# Patient Record
Sex: FEMALE | Race: WHITE | Employment: PART TIME
[De-identification: names, ages, dates, MRNs, and addresses within clinical notes are randomized per-mention and may not be internally consistent; named-entity substitution may affect disease eponyms.]

---

## 2017-01-05 ENCOUNTER — OFFICE VISIT (OUTPATIENT)
Dept: ORTHOPEDIC SURGERY | Facility: CLINIC | Age: 37
End: 2017-01-05

## 2017-01-05 DIAGNOSIS — M47.816 FACET ARTHRITIS OF LUMBAR REGION: Primary | ICD-10-CM

## 2017-01-05 PROCEDURE — 99213 OFFICE O/P EST LOW 20 MIN: CPT | Performed by: ORTHOPAEDIC SURGERY

## 2017-01-05 ASSESSMENT — ENCOUNTER SYMPTOMS: BACK PAIN: 1

## 2017-02-07 ENCOUNTER — TELEPHONE (OUTPATIENT)
Dept: PAIN MANAGEMENT | Facility: CLINIC | Age: 37
End: 2017-02-07

## 2017-02-08 ENCOUNTER — TELEPHONE (OUTPATIENT)
Dept: PAIN MANAGEMENT | Facility: CLINIC | Age: 37
End: 2017-02-08

## 2017-02-16 ENCOUNTER — OFFICE VISIT (OUTPATIENT)
Dept: PAIN MANAGEMENT | Facility: CLINIC | Age: 37
End: 2017-02-16

## 2017-02-16 VITALS
HEIGHT: 63 IN | DIASTOLIC BLOOD PRESSURE: 69 MMHG | WEIGHT: 113 LBS | BODY MASS INDEX: 20.02 KG/M2 | HEART RATE: 80 BPM | RESPIRATION RATE: 16 BRPM | OXYGEN SATURATION: 96 % | SYSTOLIC BLOOD PRESSURE: 103 MMHG

## 2017-02-16 DIAGNOSIS — M51.9 LUMBAR DISC DISEASE: Chronic | ICD-10-CM

## 2017-02-16 DIAGNOSIS — Z92.241 S/P EPIDURAL STEROID INJECTION: ICD-10-CM

## 2017-02-16 DIAGNOSIS — M47.816 SPONDYLOSIS OF LUMBAR REGION WITHOUT MYELOPATHY OR RADICULOPATHY: Primary | Chronic | ICD-10-CM

## 2017-02-16 PROCEDURE — 99213 OFFICE O/P EST LOW 20 MIN: CPT | Performed by: ANESTHESIOLOGY

## 2017-02-16 ASSESSMENT — ENCOUNTER SYMPTOMS: BACK PAIN: 1

## 2017-03-15 PROBLEM — O24.414 INSULIN CONTROLLED GESTATIONAL DIABETES MELLITUS (GDM) IN THIRD TRIMESTER: Status: ACTIVE | Noted: 2017-03-15

## 2017-03-22 ENCOUNTER — TELEPHONE (OUTPATIENT)
Dept: PAIN MANAGEMENT | Age: 37
End: 2017-03-22

## 2017-03-22 ENCOUNTER — OFFICE VISIT (OUTPATIENT)
Dept: PAIN MANAGEMENT | Age: 37
End: 2017-03-22
Payer: COMMERCIAL

## 2017-03-22 VITALS
TEMPERATURE: 97.3 F | RESPIRATION RATE: 16 BRPM | SYSTOLIC BLOOD PRESSURE: 95 MMHG | WEIGHT: 115.2 LBS | DIASTOLIC BLOOD PRESSURE: 60 MMHG | BODY MASS INDEX: 21.2 KG/M2 | OXYGEN SATURATION: 100 % | HEIGHT: 62 IN | HEART RATE: 80 BPM

## 2017-03-22 DIAGNOSIS — Z92.241 S/P EPIDURAL STEROID INJECTION: ICD-10-CM

## 2017-03-22 DIAGNOSIS — M54.16 LUMBAR RADICULAR SYNDROME: ICD-10-CM

## 2017-03-22 DIAGNOSIS — M47.816 SPONDYLOSIS OF LUMBAR REGION WITHOUT MYELOPATHY OR RADICULOPATHY: Chronic | ICD-10-CM

## 2017-03-22 DIAGNOSIS — M51.9 LUMBAR DISC DISEASE: Primary | Chronic | ICD-10-CM

## 2017-03-22 PROCEDURE — 99214 OFFICE O/P EST MOD 30 MIN: CPT | Performed by: ANESTHESIOLOGY

## 2017-03-22 RX ORDER — GABAPENTIN 100 MG/1
200 CAPSULE ORAL 3 TIMES DAILY
Qty: 120 CAPSULE | Refills: 2 | Status: SHIPPED | OUTPATIENT
Start: 2017-03-22 | End: 2017-05-03 | Stop reason: SDUPTHER

## 2017-03-22 RX ORDER — CYCLOBENZAPRINE HCL 10 MG
10 TABLET ORAL
COMMUNITY
Start: 2017-02-17 | End: 2017-05-03 | Stop reason: ALTCHOICE

## 2017-03-22 ASSESSMENT — ENCOUNTER SYMPTOMS
RESPIRATORY NEGATIVE: 1
GASTROINTESTINAL NEGATIVE: 1
BACK PAIN: 1
ALLERGIC/IMMUNOLOGIC NEGATIVE: 1
EYES NEGATIVE: 1

## 2017-03-23 ENCOUNTER — TELEPHONE (OUTPATIENT)
Dept: PAIN MANAGEMENT | Age: 37
End: 2017-03-23

## 2017-03-23 ENCOUNTER — OFFICE VISIT (OUTPATIENT)
Dept: ORTHOPEDIC SURGERY | Age: 37
End: 2017-03-23
Payer: COMMERCIAL

## 2017-03-23 DIAGNOSIS — M47.816 SPONDYLOSIS OF LUMBAR REGION WITHOUT MYELOPATHY OR RADICULOPATHY: Primary | Chronic | ICD-10-CM

## 2017-03-23 DIAGNOSIS — M51.36 BULGING LUMBAR DISC: ICD-10-CM

## 2017-03-23 DIAGNOSIS — M47.816 FACET ARTHRITIS OF LUMBAR REGION: Primary | ICD-10-CM

## 2017-03-23 DIAGNOSIS — M48.061 SPINAL STENOSIS AT L4-L5 LEVEL: ICD-10-CM

## 2017-03-23 PROCEDURE — 99213 OFFICE O/P EST LOW 20 MIN: CPT | Performed by: ORTHOPAEDIC SURGERY

## 2017-03-28 PROBLEM — O36.8390 VARIABLE FETAL HEART RATE DECELERATIONS, ANTEPARTUM: Status: ACTIVE | Noted: 2017-03-28

## 2017-03-28 PROBLEM — O28.8 NON-REACTIVE NST (NON-STRESS TEST): Status: ACTIVE | Noted: 2017-03-28

## 2017-04-03 ENCOUNTER — TELEPHONE (OUTPATIENT)
Dept: PAIN MANAGEMENT | Age: 37
End: 2017-04-03

## 2017-04-17 ENCOUNTER — TELEPHONE (OUTPATIENT)
Dept: PAIN MANAGEMENT | Age: 37
End: 2017-04-17

## 2017-04-19 ENCOUNTER — TELEPHONE (OUTPATIENT)
Dept: FAMILY MEDICINE CLINIC | Age: 37
End: 2017-04-19

## 2017-05-01 ENCOUNTER — TELEPHONE (OUTPATIENT)
Dept: PAIN MANAGEMENT | Age: 37
End: 2017-05-01

## 2017-05-02 ENCOUNTER — TELEPHONE (OUTPATIENT)
Dept: ORTHOPEDIC SURGERY | Age: 37
End: 2017-05-02

## 2017-05-03 ENCOUNTER — TELEPHONE (OUTPATIENT)
Dept: PAIN MANAGEMENT | Age: 37
End: 2017-05-03

## 2017-06-22 ENCOUNTER — OFFICE VISIT (OUTPATIENT)
Dept: ORTHOPEDIC SURGERY | Age: 37
End: 2017-06-22
Payer: COMMERCIAL

## 2017-06-22 DIAGNOSIS — M54.31 SCIATICA OF RIGHT SIDE: ICD-10-CM

## 2017-06-22 DIAGNOSIS — M47.816 FACET ARTHRITIS OF LUMBAR REGION: Primary | ICD-10-CM

## 2017-06-22 DIAGNOSIS — M48.061 SPINAL STENOSIS AT L4-L5 LEVEL: ICD-10-CM

## 2017-06-22 PROCEDURE — 99213 OFFICE O/P EST LOW 20 MIN: CPT | Performed by: ORTHOPAEDIC SURGERY

## 2017-08-02 PROBLEM — N30.20 CYSTITIS, CHRONIC: Status: ACTIVE | Noted: 2017-08-02

## 2017-08-02 PROBLEM — N13.30 HYDRONEPHROSIS: Status: ACTIVE | Noted: 2017-08-02

## 2017-09-25 ENCOUNTER — OFFICE VISIT (OUTPATIENT)
Dept: ORTHOPEDIC SURGERY | Age: 37
End: 2017-09-25
Payer: COMMERCIAL

## 2017-09-25 DIAGNOSIS — M47.816 FACET ARTHRITIS OF LUMBAR REGION: ICD-10-CM

## 2017-09-25 DIAGNOSIS — M47.816 SPONDYLOSIS OF LUMBAR REGION WITHOUT MYELOPATHY OR RADICULOPATHY: Chronic | ICD-10-CM

## 2017-09-25 DIAGNOSIS — M48.061 SPINAL STENOSIS AT L4-L5 LEVEL: Primary | ICD-10-CM

## 2017-09-25 PROCEDURE — 99213 OFFICE O/P EST LOW 20 MIN: CPT | Performed by: ORTHOPAEDIC SURGERY

## 2018-01-17 PROBLEM — Z79.899 MEDICATION MANAGEMENT: Status: ACTIVE | Noted: 2018-01-17

## 2018-07-30 ENCOUNTER — HOSPITAL ENCOUNTER (OUTPATIENT)
Age: 38
Discharge: HOME | End: 2018-08-01
Payer: COMMERCIAL

## 2018-07-30 ENCOUNTER — HOSPITAL ENCOUNTER (OUTPATIENT)
Dept: ULTRASOUND IMAGING | Age: 38
Discharge: HOME | End: 2018-08-01
Payer: COMMERCIAL

## 2018-07-30 DIAGNOSIS — E04.1 NONTOXIC SINGLE THYROID NODULE: ICD-10-CM

## 2018-07-30 PROCEDURE — 76536 US EXAM OF HEAD AND NECK: CPT

## 2019-01-02 ENCOUNTER — OFFICE VISIT (OUTPATIENT)
Dept: PRIMARY CARE CLINIC | Age: 39
End: 2019-01-02
Payer: COMMERCIAL

## 2019-01-02 VITALS
HEART RATE: 81 BPM | DIASTOLIC BLOOD PRESSURE: 68 MMHG | TEMPERATURE: 98.2 F | OXYGEN SATURATION: 99 % | BODY MASS INDEX: 20.48 KG/M2 | WEIGHT: 113.8 LBS | SYSTOLIC BLOOD PRESSURE: 102 MMHG

## 2019-01-02 DIAGNOSIS — J06.9 ACUTE URI: Primary | ICD-10-CM

## 2019-01-02 DIAGNOSIS — Z28.21 REFUSED INFLUENZA VACCINE: ICD-10-CM

## 2019-01-02 DIAGNOSIS — Z28.21 REFUSED STREPTOCOCCUS PNEUMONIAE VACCINATION: ICD-10-CM

## 2019-01-02 DIAGNOSIS — R06.02 SOB (SHORTNESS OF BREATH): ICD-10-CM

## 2019-01-02 PROCEDURE — 4004F PT TOBACCO SCREEN RCVD TLK: CPT | Performed by: PHYSICIAN ASSISTANT

## 2019-01-02 PROCEDURE — G8484 FLU IMMUNIZE NO ADMIN: HCPCS | Performed by: PHYSICIAN ASSISTANT

## 2019-01-02 PROCEDURE — G8427 DOCREV CUR MEDS BY ELIG CLIN: HCPCS | Performed by: PHYSICIAN ASSISTANT

## 2019-01-02 PROCEDURE — 99213 OFFICE O/P EST LOW 20 MIN: CPT | Performed by: PHYSICIAN ASSISTANT

## 2019-01-02 PROCEDURE — G8420 CALC BMI NORM PARAMETERS: HCPCS | Performed by: PHYSICIAN ASSISTANT

## 2019-01-02 RX ORDER — ALBUTEROL SULFATE 90 UG/1
2 AEROSOL, METERED RESPIRATORY (INHALATION) EVERY 6 HOURS PRN
Qty: 1 INHALER | Refills: 0 | Status: SHIPPED | OUTPATIENT
Start: 2019-01-02 | End: 2019-01-29

## 2019-01-02 RX ORDER — GABAPENTIN 300 MG/1
300 CAPSULE ORAL
COMMUNITY
End: 2019-01-29

## 2019-01-02 ASSESSMENT — ENCOUNTER SYMPTOMS
WHEEZING: 0
COUGH: 1
CHEST TIGHTNESS: 1
SHORTNESS OF BREATH: 1

## 2019-01-05 PROBLEM — Z28.21 REFUSED INFLUENZA VACCINE: Status: ACTIVE | Noted: 2019-01-05

## 2019-01-05 PROBLEM — Z28.21 REFUSED STREPTOCOCCUS PNEUMONIAE VACCINATION: Status: ACTIVE | Noted: 2019-01-05

## 2019-01-05 ASSESSMENT — ENCOUNTER SYMPTOMS
RHINORRHEA: 1
SORE THROAT: 1
SINUS PRESSURE: 0

## 2019-01-29 ENCOUNTER — OFFICE VISIT (OUTPATIENT)
Dept: PRIMARY CARE CLINIC | Age: 39
End: 2019-01-29
Payer: COMMERCIAL

## 2019-01-29 VITALS
HEART RATE: 65 BPM | DIASTOLIC BLOOD PRESSURE: 64 MMHG | OXYGEN SATURATION: 99 % | SYSTOLIC BLOOD PRESSURE: 106 MMHG | WEIGHT: 115.4 LBS | BODY MASS INDEX: 20.77 KG/M2

## 2019-01-29 DIAGNOSIS — M51.9 LUMBAR DISC DISEASE: Primary | Chronic | ICD-10-CM

## 2019-01-29 DIAGNOSIS — M47.816 FACET ARTHRITIS OF LUMBAR REGION: ICD-10-CM

## 2019-01-29 DIAGNOSIS — B07.9 VIRAL WARTS, UNSPECIFIED TYPE: ICD-10-CM

## 2019-01-29 DIAGNOSIS — M47.816 SPONDYLOSIS OF LUMBAR REGION WITHOUT MYELOPATHY OR RADICULOPATHY: Chronic | ICD-10-CM

## 2019-01-29 PROCEDURE — 4004F PT TOBACCO SCREEN RCVD TLK: CPT | Performed by: PHYSICIAN ASSISTANT

## 2019-01-29 PROCEDURE — 99214 OFFICE O/P EST MOD 30 MIN: CPT | Performed by: PHYSICIAN ASSISTANT

## 2019-01-29 PROCEDURE — G8420 CALC BMI NORM PARAMETERS: HCPCS | Performed by: PHYSICIAN ASSISTANT

## 2019-01-29 PROCEDURE — G8427 DOCREV CUR MEDS BY ELIG CLIN: HCPCS | Performed by: PHYSICIAN ASSISTANT

## 2019-01-29 PROCEDURE — G8484 FLU IMMUNIZE NO ADMIN: HCPCS | Performed by: PHYSICIAN ASSISTANT

## 2019-01-29 ASSESSMENT — ENCOUNTER SYMPTOMS
NAUSEA: 0
ABDOMINAL PAIN: 0
GASTROINTESTINAL NEGATIVE: 1
SHORTNESS OF BREATH: 0
BLOOD IN STOOL: 0
CHEST TIGHTNESS: 0
BACK PAIN: 1
COUGH: 0

## 2019-02-08 ENCOUNTER — OFFICE VISIT (OUTPATIENT)
Dept: PRIMARY CARE CLINIC | Age: 39
End: 2019-02-08
Payer: COMMERCIAL

## 2019-02-08 VITALS
OXYGEN SATURATION: 99 % | HEART RATE: 68 BPM | WEIGHT: 116.6 LBS | DIASTOLIC BLOOD PRESSURE: 60 MMHG | BODY MASS INDEX: 20.99 KG/M2 | SYSTOLIC BLOOD PRESSURE: 112 MMHG

## 2019-02-08 DIAGNOSIS — B07.9 VIRAL WARTS, UNSPECIFIED TYPE: Primary | ICD-10-CM

## 2019-02-08 PROCEDURE — 17110 DESTRUCTION B9 LES UP TO 14: CPT | Performed by: PHYSICIAN ASSISTANT

## 2019-02-08 ASSESSMENT — PATIENT HEALTH QUESTIONNAIRE - PHQ9
SUM OF ALL RESPONSES TO PHQ9 QUESTIONS 1 & 2: 0
1. LITTLE INTEREST OR PLEASURE IN DOING THINGS: 0
SUM OF ALL RESPONSES TO PHQ QUESTIONS 1-9: 0
SUM OF ALL RESPONSES TO PHQ QUESTIONS 1-9: 0
2. FEELING DOWN, DEPRESSED OR HOPELESS: 0

## 2019-02-11 ENCOUNTER — PATIENT MESSAGE (OUTPATIENT)
Dept: PRIMARY CARE CLINIC | Age: 39
End: 2019-02-11

## 2019-02-12 ENCOUNTER — PATIENT MESSAGE (OUTPATIENT)
Dept: PRIMARY CARE CLINIC | Age: 39
End: 2019-02-12

## 2019-02-12 DIAGNOSIS — M47.816 SPONDYLOSIS OF LUMBAR REGION WITHOUT MYELOPATHY OR RADICULOPATHY: Chronic | ICD-10-CM

## 2019-02-12 DIAGNOSIS — M54.31 SCIATICA OF RIGHT SIDE: ICD-10-CM

## 2019-02-12 DIAGNOSIS — M51.9 LUMBAR DISC DISEASE: Chronic | ICD-10-CM

## 2019-02-12 DIAGNOSIS — M51.36 BULGING LUMBAR DISC: ICD-10-CM

## 2019-02-12 DIAGNOSIS — M48.061 SPINAL STENOSIS AT L4-L5 LEVEL: ICD-10-CM

## 2019-02-12 DIAGNOSIS — M47.816 FACET ARTHRITIS OF LUMBAR REGION: Primary | ICD-10-CM

## 2019-02-13 ENCOUNTER — TELEPHONE (OUTPATIENT)
Dept: PRIMARY CARE CLINIC | Age: 39
End: 2019-02-13

## 2019-02-13 DIAGNOSIS — M47.816 FACET ARTHRITIS OF LUMBAR REGION: Primary | ICD-10-CM

## 2019-02-13 DIAGNOSIS — M51.9 LUMBAR DISC DISEASE: Chronic | ICD-10-CM

## 2019-02-15 ENCOUNTER — PATIENT MESSAGE (OUTPATIENT)
Dept: PRIMARY CARE CLINIC | Age: 39
End: 2019-02-15

## 2019-02-25 ENCOUNTER — HOSPITAL ENCOUNTER (OUTPATIENT)
Dept: MRI IMAGING | Facility: CLINIC | Age: 39
Discharge: HOME | End: 2019-02-27
Payer: COMMERCIAL

## 2019-02-25 DIAGNOSIS — M47.816 FACET ARTHRITIS OF LUMBAR REGION: ICD-10-CM

## 2019-02-25 DIAGNOSIS — M51.9 LUMBAR DISC DISEASE: Chronic | ICD-10-CM

## 2019-02-25 PROCEDURE — 72148 MRI LUMBAR SPINE W/O DYE: CPT

## 2019-04-29 ENCOUNTER — PATIENT MESSAGE (OUTPATIENT)
Dept: PRIMARY CARE CLINIC | Age: 39
End: 2019-04-29

## 2019-04-30 NOTE — TELEPHONE ENCOUNTER
Darshan Grimm will be out of the office for 2 weeks, pt was notified of this, she states she will wait for Darshan Grimm to be back in the office to address this.

## 2019-08-29 ENCOUNTER — OFFICE VISIT (OUTPATIENT)
Dept: PRIMARY CARE CLINIC | Age: 39
End: 2019-08-29
Payer: COMMERCIAL

## 2019-08-29 VITALS
BODY MASS INDEX: 20.55 KG/M2 | OXYGEN SATURATION: 98 % | WEIGHT: 114.2 LBS | HEART RATE: 64 BPM | SYSTOLIC BLOOD PRESSURE: 106 MMHG | DIASTOLIC BLOOD PRESSURE: 68 MMHG | TEMPERATURE: 98.3 F

## 2019-08-29 DIAGNOSIS — L23.7 ALLERGIC DERMATITIS DUE TO POISON IVY: ICD-10-CM

## 2019-08-29 PROCEDURE — G8427 DOCREV CUR MEDS BY ELIG CLIN: HCPCS | Performed by: PHYSICIAN ASSISTANT

## 2019-08-29 PROCEDURE — G8420 CALC BMI NORM PARAMETERS: HCPCS | Performed by: PHYSICIAN ASSISTANT

## 2019-08-29 PROCEDURE — 4004F PT TOBACCO SCREEN RCVD TLK: CPT | Performed by: PHYSICIAN ASSISTANT

## 2019-08-29 PROCEDURE — 99213 OFFICE O/P EST LOW 20 MIN: CPT | Performed by: PHYSICIAN ASSISTANT

## 2019-08-29 RX ORDER — METHYLPREDNISOLONE 4 MG/1
TABLET ORAL
Qty: 1 KIT | Refills: 0 | Status: SHIPPED | OUTPATIENT
Start: 2019-08-29 | End: 2019-09-04

## 2019-08-29 RX ORDER — CETIRIZINE HYDROCHLORIDE 10 MG/1
10 TABLET ORAL 2 TIMES DAILY
Qty: 30 TABLET | Refills: 0 | Status: SHIPPED | OUTPATIENT
Start: 2019-08-29 | End: 2019-10-01 | Stop reason: SDUPTHER

## 2019-08-29 ASSESSMENT — ENCOUNTER SYMPTOMS
EYE ITCHING: 1
COLOR CHANGE: 0
ABDOMINAL PAIN: 0
RESPIRATORY NEGATIVE: 1

## 2019-09-05 DIAGNOSIS — L23.7 ALLERGIC DERMATITIS DUE TO POISON IVY: Primary | ICD-10-CM

## 2019-09-05 RX ORDER — RANITIDINE 300 MG/1
300 TABLET ORAL NIGHTLY
Qty: 30 TABLET | Refills: 0 | Status: SHIPPED | OUTPATIENT
Start: 2019-09-05 | End: 2019-10-10 | Stop reason: SDUPTHER

## 2019-09-05 RX ORDER — CLOBETASOL PROPIONATE 0.5 MG/G
CREAM TOPICAL
Qty: 60 G | Refills: 0 | Status: SHIPPED | OUTPATIENT
Start: 2019-09-05 | End: 2021-08-09 | Stop reason: ALTCHOICE

## 2019-10-02 RX ORDER — CETIRIZINE HYDROCHLORIDE 10 MG/1
TABLET ORAL
Qty: 30 TABLET | Refills: 3 | Status: SHIPPED | OUTPATIENT
Start: 2019-10-02 | End: 2019-10-10 | Stop reason: DRUGHIGH

## 2019-10-18 ENCOUNTER — OFFICE VISIT (OUTPATIENT)
Dept: PRIMARY CARE CLINIC | Age: 39
End: 2019-10-18
Payer: COMMERCIAL

## 2019-10-18 VITALS
SYSTOLIC BLOOD PRESSURE: 112 MMHG | WEIGHT: 115 LBS | HEART RATE: 59 BPM | BODY MASS INDEX: 20.7 KG/M2 | OXYGEN SATURATION: 99 % | DIASTOLIC BLOOD PRESSURE: 72 MMHG

## 2019-10-18 DIAGNOSIS — Z87.2 HISTORY OF CYST OF BREAST: ICD-10-CM

## 2019-10-18 DIAGNOSIS — Z23 NEED FOR VACCINATION: ICD-10-CM

## 2019-10-18 DIAGNOSIS — N64.4 BREAST PAIN: Primary | ICD-10-CM

## 2019-10-18 PROCEDURE — G8427 DOCREV CUR MEDS BY ELIG CLIN: HCPCS | Performed by: PHYSICIAN ASSISTANT

## 2019-10-18 PROCEDURE — 90471 IMMUNIZATION ADMIN: CPT | Performed by: PHYSICIAN ASSISTANT

## 2019-10-18 PROCEDURE — G8484 FLU IMMUNIZE NO ADMIN: HCPCS | Performed by: PHYSICIAN ASSISTANT

## 2019-10-18 PROCEDURE — G8420 CALC BMI NORM PARAMETERS: HCPCS | Performed by: PHYSICIAN ASSISTANT

## 2019-10-18 PROCEDURE — 99213 OFFICE O/P EST LOW 20 MIN: CPT | Performed by: PHYSICIAN ASSISTANT

## 2019-10-18 PROCEDURE — 4004F PT TOBACCO SCREEN RCVD TLK: CPT | Performed by: PHYSICIAN ASSISTANT

## 2019-10-18 PROCEDURE — 90732 PPSV23 VACC 2 YRS+ SUBQ/IM: CPT | Performed by: PHYSICIAN ASSISTANT

## 2019-10-18 ASSESSMENT — ENCOUNTER SYMPTOMS
NAUSEA: 0
VOMITING: 0

## 2019-10-30 DIAGNOSIS — N64.4 BREAST PAIN: ICD-10-CM

## 2019-10-30 DIAGNOSIS — Z87.2 HISTORY OF CYST OF BREAST: ICD-10-CM

## 2020-01-29 ENCOUNTER — HOSPITAL ENCOUNTER (OUTPATIENT)
Dept: ULTRASOUND IMAGING | Age: 40
Discharge: HOME | End: 2020-01-31
Payer: COMMERCIAL

## 2020-01-29 PROCEDURE — 76536 US EXAM OF HEAD AND NECK: CPT

## 2020-02-20 RX ORDER — CETIRIZINE HYDROCHLORIDE 10 MG/1
10 TABLET ORAL DAILY
Qty: 30 TABLET | Refills: 11 | Status: SHIPPED | OUTPATIENT
Start: 2020-02-20 | End: 2021-08-09 | Stop reason: ALTCHOICE

## 2020-03-11 ENCOUNTER — OFFICE VISIT (OUTPATIENT)
Dept: PRIMARY CARE CLINIC | Age: 40
End: 2020-03-11
Payer: COMMERCIAL

## 2020-03-11 VITALS
DIASTOLIC BLOOD PRESSURE: 72 MMHG | HEIGHT: 63 IN | OXYGEN SATURATION: 98 % | TEMPERATURE: 98 F | HEART RATE: 72 BPM | WEIGHT: 114 LBS | SYSTOLIC BLOOD PRESSURE: 116 MMHG | BODY MASS INDEX: 20.2 KG/M2

## 2020-03-11 PROBLEM — M54.2 CERVICALGIA: Status: ACTIVE | Noted: 2019-11-27

## 2020-03-11 PROBLEM — R09.81 NASAL CONGESTION: Status: ACTIVE | Noted: 2020-03-11

## 2020-03-11 PROBLEM — J30.2 SEASONAL ALLERGIC RHINITIS: Status: ACTIVE | Noted: 2020-03-11

## 2020-03-11 PROCEDURE — G8484 FLU IMMUNIZE NO ADMIN: HCPCS | Performed by: PHYSICIAN ASSISTANT

## 2020-03-11 PROCEDURE — 87804 INFLUENZA ASSAY W/OPTIC: CPT | Performed by: PHYSICIAN ASSISTANT

## 2020-03-11 PROCEDURE — 99213 OFFICE O/P EST LOW 20 MIN: CPT | Performed by: PHYSICIAN ASSISTANT

## 2020-03-11 PROCEDURE — G8427 DOCREV CUR MEDS BY ELIG CLIN: HCPCS | Performed by: PHYSICIAN ASSISTANT

## 2020-03-11 PROCEDURE — 4004F PT TOBACCO SCREEN RCVD TLK: CPT | Performed by: PHYSICIAN ASSISTANT

## 2020-03-11 PROCEDURE — G8420 CALC BMI NORM PARAMETERS: HCPCS | Performed by: PHYSICIAN ASSISTANT

## 2020-03-11 PROCEDURE — 96372 THER/PROPH/DIAG INJ SC/IM: CPT | Performed by: PHYSICIAN ASSISTANT

## 2020-03-11 RX ORDER — METHYLPREDNISOLONE ACETATE 80 MG/ML
80 INJECTION, SUSPENSION INTRA-ARTICULAR; INTRALESIONAL; INTRAMUSCULAR; SOFT TISSUE ONCE
Status: COMPLETED | OUTPATIENT
Start: 2020-03-11 | End: 2020-03-11

## 2020-03-11 RX ADMIN — METHYLPREDNISOLONE ACETATE 80 MG: 80 INJECTION, SUSPENSION INTRA-ARTICULAR; INTRALESIONAL; INTRAMUSCULAR; SOFT TISSUE at 10:54

## 2020-03-11 ASSESSMENT — PATIENT HEALTH QUESTIONNAIRE - PHQ9
1. LITTLE INTEREST OR PLEASURE IN DOING THINGS: 0
2. FEELING DOWN, DEPRESSED OR HOPELESS: 0
SUM OF ALL RESPONSES TO PHQ9 QUESTIONS 1 & 2: 0
SUM OF ALL RESPONSES TO PHQ QUESTIONS 1-9: 0
SUM OF ALL RESPONSES TO PHQ QUESTIONS 1-9: 0

## 2020-03-11 ASSESSMENT — ENCOUNTER SYMPTOMS
SORE THROAT: 0
EYE DISCHARGE: 1
SHORTNESS OF BREATH: 0
COUGH: 1
NAUSEA: 1
DIARRHEA: 0
WHEEZING: 0
VOMITING: 0
EYE ITCHING: 1

## 2020-03-11 NOTE — PROGRESS NOTES
rhinitis, unspecified trigger  methylPREDNISolone acetate (DEPO-MEDROL) injection 80 mg        Plan:    Depo Medrol today  Continue Claritin D  If feels too hyperactive, then stop the D or sudafed     Return if symptoms worsen or fail to improve.     Orders Placed This Encounter   Procedures    POCT Influenza A/B     Orders Placed This Encounter   Medications    methylPREDNISolone acetate (DEPO-MEDROL) injection 80 mg           Electronically signed by Mitzy Flaherty 3/11/2020 at 10:28 AM

## 2020-05-18 ENCOUNTER — TELEPHONE (OUTPATIENT)
Dept: ONCOLOGY | Age: 40
End: 2020-05-18

## 2020-06-01 ENCOUNTER — OFFICE VISIT (OUTPATIENT)
Dept: PRIMARY CARE CLINIC | Age: 40
End: 2020-06-01
Payer: COMMERCIAL

## 2020-06-01 VITALS
HEIGHT: 62 IN | WEIGHT: 111.4 LBS | OXYGEN SATURATION: 99 % | SYSTOLIC BLOOD PRESSURE: 112 MMHG | BODY MASS INDEX: 20.5 KG/M2 | DIASTOLIC BLOOD PRESSURE: 64 MMHG | TEMPERATURE: 97.8 F | HEART RATE: 80 BPM

## 2020-06-01 PROBLEM — D48.5 NEOPLASM OF UNCERTAIN BEHAVIOR OF SKIN: Status: ACTIVE | Noted: 2020-06-01

## 2020-06-01 PROCEDURE — 4004F PT TOBACCO SCREEN RCVD TLK: CPT | Performed by: PHYSICIAN ASSISTANT

## 2020-06-01 PROCEDURE — 99213 OFFICE O/P EST LOW 20 MIN: CPT | Performed by: PHYSICIAN ASSISTANT

## 2020-06-01 PROCEDURE — G8427 DOCREV CUR MEDS BY ELIG CLIN: HCPCS | Performed by: PHYSICIAN ASSISTANT

## 2020-06-01 PROCEDURE — G8420 CALC BMI NORM PARAMETERS: HCPCS | Performed by: PHYSICIAN ASSISTANT

## 2020-06-01 RX ORDER — BUSPIRONE HYDROCHLORIDE 5 MG/1
5 TABLET ORAL 2 TIMES DAILY
Qty: 60 TABLET | Refills: 0 | Status: SHIPPED | OUTPATIENT
Start: 2020-06-01 | End: 2020-06-23

## 2020-06-01 ASSESSMENT — ENCOUNTER SYMPTOMS
DIARRHEA: 1
RESPIRATORY NEGATIVE: 1

## 2020-06-01 NOTE — PROGRESS NOTES
[facility]  [address] [place] [id number]  Dept: [phone] Krystal Mednoza is a 35 y.o. female who presents today for her medical conditions/complaintsas noted below. Chief Complaint   Patient presents with    Anxiety     patient states she has been having issues with Anxiety, she said this started on 4/7/2020. HPI:     HPI  On 4-7-20 she started with anxiety when boyfriend went to [employer]. M-F working so ok and was fine when brother was come over on Fridays. She doesn't want to get to the point where she was when he ex left. She gets shaky and has diarrhea, feels nervous and sleeps a lot on the weekends when she is home by herself.    Cut back on MDew  No ETOH or rec drug use    Lesion on ring finger on right hand started as little bumps and is now a hyperkeratotic lesion that gets aggravated with washing, etc    No results found for: LDLCHOLESTEROL, LDLCALC    (goal LDL is <100)   No results found for: AST, ALT, BUN  BP Readings from Last 3 Encounters:   06/01/20 112/64   03/11/20 116/72   10/18/19 112/72          (goal 120/80)    Past Medical History:   Diagnosis Date    Abdominal pain     feels crampy/colicky    Abnormal CT of the abdomen     Acute upper respiratory infection     Coccyx contusion     Fractured coccyx ([address]     H/O acute pharyngitis     H/O acute sinusitis     H/O contact dermatitis     H/O nausea and vomiting     H/O pyelonephritis     Lump of skin       Past Surgical History:   Procedure Laterality Date    HYSTEROSCOPY      with endometrial ablation    KNEE ARTHROSCOPY      OTHER SURGICAL HISTORY Right 12/19/2016    right sided lumbar facet steroid injection    OTHER SURGICAL HISTORY  01/12/2017    lumbar medial nerve block L2-L5    PARTIAL HYSTERECTOMY      TUBAL LIGATION         Family History   Problem Relation Age of Onset    Heart Disease Mother     High Blood Pressure Mother     High Cholesterol Mother     Arthritis Father     Other Maternal Grandmother         Mantel cell lymphoma    Other Paternal Aunt         oral cancer       Social History     Tobacco Use    Smoking status: Current Every Day Smoker     Types: Cigarettes    Smokeless tobacco: Never Used   Substance Use Topics    Alcohol use: Yes     Alcohol/week: 0.0 standard drinks     Comment: occ. Current Outpatient Medications   Medication Sig Dispense Refill    busPIRone (BUSPAR) 5 MG tablet Take 1 tablet by mouth 2 times daily 60 tablet 0    cetirizine (ZYRTEC) 10 MG tablet Take 1 tablet by mouth daily 30 tablet 11    clobetasol (TEMOVATE) 0.05 % cream Apply topically 2 times daily. 60 g 0    traZODone (DESYREL) 50 MG tablet Take 1 tablet by mouth every evening      diclofenac (VOLTAREN) 50 MG EC tablet Take 1-2 tablets by mouth daily      tiZANidine (ZANAFLEX) 4 MG tablet Take 1 tablet by mouth daily      HYDROcodone-acetaminophen (NORCO) 5-325 MG per tablet Take 1 tablet by mouth nightly. .       No current facility-administered medications for this visit.       Allergies   Allergen Reactions    Tramadol Other (See Comments) and Hives     Feels intoxicated for a long time  Keeps her up  Feels intoxicated for a long time    Ciprofloxacin     Vicodin [Hydrocodone-Acetaminophen] Other (See Comments)     Feels like bugs crawling all over    Percocet [Oxycodone-Acetaminophen] Nausea And Vomiting       Health Maintenance   Topic Date Due    HIV screen  12/14/1994    Cervical cancer screen  12/14/2000    Lipid screen  12/14/2019    Flu vaccine (Season Ended) 10/18/2020 (Originally 9/1/2020)    DTaP/Tdap/Td vaccine (3 - Td) 04/19/2027    Pneumococcal 0-64 years Vaccine  Completed    Hepatitis A vaccine  Aged Out    Hepatitis B vaccine  Aged Out    Hib vaccine  Aged Out    Meningococcal (ACWY) vaccine  Aged Out       Subjective:      Review of Systems   Constitutional: Positive for appetite change and diaphoresis

## 2020-06-05 ENCOUNTER — HOSPITAL ENCOUNTER (OUTPATIENT)
Age: 40
Setting detail: SPECIMEN
Discharge: HOME | End: 2020-06-05
Payer: COMMERCIAL

## 2020-06-05 ENCOUNTER — PROCEDURE VISIT (OUTPATIENT)
Dept: PRIMARY CARE CLINIC | Age: 40
End: 2020-06-05
Payer: COMMERCIAL

## 2020-06-05 VITALS
WEIGHT: 110.8 LBS | SYSTOLIC BLOOD PRESSURE: 116 MMHG | DIASTOLIC BLOOD PRESSURE: 64 MMHG | HEIGHT: 62 IN | HEART RATE: 70 BPM | TEMPERATURE: 98.4 F | OXYGEN SATURATION: 99 % | BODY MASS INDEX: 20.39 KG/M2

## 2020-06-05 LAB
THYROXINE, FREE: 1.19 NG/DL (ref 0.93–1.7)
TSH SERPL DL<=0.05 MIU/L-ACNC: 1.71 MIU/L (ref 0.3–5)

## 2020-06-05 PROCEDURE — 11102 TANGNTL BX SKIN SINGLE LES: CPT | Performed by: PHYSICIAN ASSISTANT

## 2020-06-05 NOTE — PROGRESS NOTES
Skin Biopsy Procedure Note  6/5/2020  Willem Animeeplefletcher  1980    /64   Pulse 70   Temp 98.4 °F (36.9 °C)   Ht 5' 2\" (1.575 m)   Wt 110 lb 12.8 oz (50.3 kg)   SpO2 99%   BMI 20.27 kg/m²   VITALS REVIEWED    Allergies   Allergen Reactions    Tramadol Other (See Comments) and Hives     Feels intoxicated for a long time  Keeps her up  Feels intoxicated for a long time    Ciprofloxacin     Vicodin [Hydrocodone-Acetaminophen] Other (See Comments)     Feels like bugs crawling all over    Percocet [Oxycodone-Acetaminophen] Nausea And Vomiting       Chief Complaint   Patient presents with    Procedure     shave bx. .        Lesion:  · 1. Right hand ring finer      Indication for Biopsy 1: hyperkeratotic lesion      Procedure: The lesion(s) was cleansed with Alcohol.  2% lidocaine with and without epinephrine was used for local anaesthesia. An 8mm shave was used to obtain a specimen. The specimen(s) was    preserved and sent for pathological examination. The biopsy site(s) was  cleansed and hemostasis using ACl and a pressure dressing(s) was achieved with good results. The patient was instructed on wound care. No complications occurred and the patient tolerated the procedure well. Diagnosis Orders   1. Neoplasm of uncertain behavior of skin  Derm biopsy       Follow Up: Wound care discussed. Keep well moisturized. Watch for signs of infection which would include:  Redness, swelling, fever. If signs appear, please return to office. Return for Will call with results.        Electronically signed by Ferdinand Tatum PA-C on 6/5/2020 at 4:10 PM

## 2020-06-08 ENCOUNTER — HOSPITAL ENCOUNTER (OUTPATIENT)
Age: 40
Setting detail: SPECIMEN
Discharge: HOME | End: 2020-06-08
Payer: COMMERCIAL

## 2020-06-10 LAB — DERMATOLOGY PATHOLOGY REPORT: NORMAL

## 2020-07-06 ENCOUNTER — OFFICE VISIT (OUTPATIENT)
Dept: PRIMARY CARE CLINIC | Age: 40
End: 2020-07-06
Payer: COMMERCIAL

## 2020-07-06 VITALS
WEIGHT: 112.4 LBS | TEMPERATURE: 96.8 F | DIASTOLIC BLOOD PRESSURE: 74 MMHG | HEIGHT: 62 IN | OXYGEN SATURATION: 97 % | BODY MASS INDEX: 20.68 KG/M2 | SYSTOLIC BLOOD PRESSURE: 120 MMHG | HEART RATE: 63 BPM

## 2020-07-06 PROBLEM — D48.5 NEOPLASM OF UNCERTAIN BEHAVIOR OF SKIN: Status: RESOLVED | Noted: 2020-06-01 | Resolved: 2020-07-06

## 2020-07-06 PROBLEM — R09.81 NASAL CONGESTION: Status: RESOLVED | Noted: 2020-03-11 | Resolved: 2020-07-06

## 2020-07-06 PROBLEM — L23.7 ALLERGIC DERMATITIS DUE TO POISON IVY: Status: RESOLVED | Noted: 2019-08-29 | Resolved: 2020-07-06

## 2020-07-06 PROCEDURE — 99213 OFFICE O/P EST LOW 20 MIN: CPT | Performed by: PHYSICIAN ASSISTANT

## 2020-07-06 PROCEDURE — G8427 DOCREV CUR MEDS BY ELIG CLIN: HCPCS | Performed by: PHYSICIAN ASSISTANT

## 2020-07-06 PROCEDURE — 4004F PT TOBACCO SCREEN RCVD TLK: CPT | Performed by: PHYSICIAN ASSISTANT

## 2020-07-06 PROCEDURE — G8420 CALC BMI NORM PARAMETERS: HCPCS | Performed by: PHYSICIAN ASSISTANT

## 2020-07-06 RX ORDER — ESCITALOPRAM OXALATE 10 MG/1
10 TABLET ORAL DAILY
Qty: 30 TABLET | Refills: 3 | Status: SHIPPED | OUTPATIENT
Start: 2020-07-06 | End: 2020-10-26

## 2020-07-06 RX ORDER — SUMATRIPTAN 100 MG/1
100 TABLET, FILM COATED ORAL
Qty: 10 TABLET | Refills: 1 | Status: SHIPPED | OUTPATIENT
Start: 2020-07-06 | End: 2020-09-01

## 2020-07-06 ASSESSMENT — ENCOUNTER SYMPTOMS
NAUSEA: 0
SHORTNESS OF BREATH: 0
BACK PAIN: 0
VOMITING: 0

## 2020-07-06 NOTE — PROGRESS NOTES
[facility]  [address] [place] [id number]  Dept: [phone] Krystal Mendoza is a 35 y.o. female who presents today for her medical conditions/complaintsas noted below. Chief Complaint   Patient presents with    Medication Check     Patient taking Buspar 5mg at night. Patient said when she wakes up she feels like she has a hangover. HPI:     HPI  Taking Buspar only at night and she wakes up feeling hung over. It is not helping her anxiety. She took a second job to help keep the anxiety. Not self treating. Sleep still not good with trazadone. Denies depression. Does not want to do counseling.       Thyroid levels were normal.     No results found for: LDLCHOLESTEROL, LDLCALC    (goal LDL is <100)   No results found for: AST, ALT, BUN  BP Readings from Last 3 Encounters:   07/06/20 120/74   06/05/20 116/64   06/01/20 112/64          (goal 120/80)    Past Medical History:   Diagnosis Date    Abdominal pain     feels crampy/colicky    Abnormal CT of the abdomen     Acute upper respiratory infection     Coccyx contusion     Fractured coccyx ([address]     H/O acute pharyngitis     H/O acute sinusitis     H/O contact dermatitis     H/O nausea and vomiting     H/O pyelonephritis     Lump of skin       Past Surgical History:   Procedure Laterality Date    HYSTEROSCOPY      with endometrial ablation    KNEE ARTHROSCOPY      OTHER SURGICAL HISTORY Right 12/19/2016    right sided lumbar facet steroid injection    OTHER SURGICAL HISTORY  01/12/2017    lumbar medial nerve block L2-L5    PARTIAL HYSTERECTOMY      TUBAL LIGATION         Family History   Problem Relation Age of Onset    Heart Disease Mother     High Blood Pressure Mother     High Cholesterol Mother     Arthritis Father     Other Maternal Grandmother         Mantel cell lymphoma    Other Paternal Aunt         oral cancer       Social History     Tobacco Use    Smoking status: Current Every Day Smoker     Types: Cigarettes    Smokeless tobacco: Never Used   Substance Use Topics    Alcohol use: Yes     Alcohol/week: 0.0 standard drinks     Comment: occ. Current Outpatient Medications   Medication Sig Dispense Refill    escitalopram (LEXAPRO) 10 MG tablet Take 1 tablet by mouth daily 30 tablet 3    SUMAtriptan (IMITREX) 100 MG tablet Take 1 tablet by mouth once as needed for Migraine 10 tablet 1    cetirizine (ZYRTEC) 10 MG tablet Take 1 tablet by mouth daily 30 tablet 11    clobetasol (TEMOVATE) 0.05 % cream Apply topically 2 times daily. 60 g 0    traZODone (DESYREL) 50 MG tablet Take 1 tablet by mouth every evening      diclofenac (VOLTAREN) 50 MG EC tablet Take 1-2 tablets by mouth daily      tiZANidine (ZANAFLEX) 4 MG tablet Take 1 tablet by mouth daily      HYDROcodone-acetaminophen (NORCO) 5-325 MG per tablet Take 1 tablet by mouth nightly. .       No current facility-administered medications for this visit. Allergies   Allergen Reactions    Tramadol Other (See Comments) and Hives     Feels intoxicated for a long time  Keeps her up  Feels intoxicated for a long time    Ciprofloxacin     Vicodin [Hydrocodone-Acetaminophen] Other (See Comments)     Feels like bugs crawling all over    Percocet [Oxycodone-Acetaminophen] Nausea And Vomiting       Health Maintenance   Topic Date Due    HIV screen  12/14/1994    Cervical cancer screen  12/14/2000    Lipid screen  12/14/2019    Flu vaccine (1) 09/01/2020    DTaP/Tdap/Td vaccine (3 - Td) 04/19/2027    Pneumococcal 0-64 years Vaccine  Completed    Hepatitis A vaccine  Aged Out    Hepatitis B vaccine  Aged Out    Hib vaccine  Aged Out    Meningococcal (ACWY) vaccine  Aged Out       Subjective:      Review of Systems   Constitutional: Negative for activity change, appetite change and fatigue. Respiratory: Negative for shortness of breath. Cardiovascular: Negative for chest pain. Gastrointestinal: Negative for nausea and vomiting. Endocrine: Negative for cold intolerance and heat intolerance. Musculoskeletal: Negative for arthralgias, back pain, myalgias and neck pain. Neurological: Negative for dizziness, seizures, syncope, weakness, light-headedness and headaches. Psychiatric/Behavioral: Negative for agitation, behavioral problems, confusion, decreased concentration, dysphoric mood, hallucinations, self-injury, sleep disturbance and suicidal ideas. The patient is not nervous/anxious and is not hyperactive. Objective:     /74   Pulse 63   Temp 96.8 °F (36 °C)   Ht 5' 2\" (1.575 m)   Wt 112 lb 6.4 oz (51 kg)   SpO2 97%   BMI 20.56 kg/m²   Physical Exam  Vitals signs and nursing note reviewed. Constitutional:       Appearance: Normal appearance. Cardiovascular:      Rate and Rhythm: Normal rate and regular rhythm. Heart sounds: Normal heart sounds. Pulmonary:      Effort: Pulmonary effort is normal.      Breath sounds: Normal breath sounds. No wheezing, rhonchi or rales. Neurological:      Mental Status: She is alert and oriented to person, place, and time. Psychiatric:         Mood and Affect: Mood normal.         Behavior: Behavior normal.         Thought Content: Thought content normal.         Judgment: Judgment normal.         Assessment:       Diagnosis Orders   1. Medication management     2. Primary hypersomnia     3. Migraine without aura and without status migrainosus, not intractable          Plan:    D/C Buspar  Imitrex for migraine. Start Lexapro 10mg 1 po qhs  Does not want counseling. Return in about 6 weeks (around 8/17/2020), or Monday for wart treatment, for On lexapro and on Monday for wart treatment. No orders of the defined types were placed in this encounter.     Orders Placed This Encounter   Medications    escitalopram (LEXAPRO) 10 MG tablet     Sig: Take 1 tablet by mouth daily     Dispense:  30 tablet     Refill:  3  SUMAtriptan (IMITREX) 100 MG tablet     Sig: Take 1 tablet by mouth once as needed for Migraine     Dispense:  10 tablet     Refill:  1       Patient given educationalmaterials - see patient instructions. Discussed use, benefit, and side effectsof prescribed medications. All patient questions answered. Pt voiced understanding. Reviewed health maintenance. Instructed to continue current medications, diet andexercise. Patient agreed with treatment plan. Follow up as directed.      Electronicallysigned by Mark Francis PA-C on 7/6/2020 at 4:37 PM

## 2020-07-13 ENCOUNTER — OFFICE VISIT (OUTPATIENT)
Dept: PRIMARY CARE CLINIC | Age: 40
End: 2020-07-13
Payer: COMMERCIAL

## 2020-07-13 VITALS
DIASTOLIC BLOOD PRESSURE: 78 MMHG | TEMPERATURE: 98.2 F | OXYGEN SATURATION: 98 % | HEIGHT: 62 IN | BODY MASS INDEX: 20.76 KG/M2 | SYSTOLIC BLOOD PRESSURE: 118 MMHG | WEIGHT: 112.8 LBS | HEART RATE: 67 BPM

## 2020-07-13 PROBLEM — B07.9 VERRUCA: Status: ACTIVE | Noted: 2020-07-13

## 2020-07-13 PROCEDURE — 11306 SHAVE SKIN LESION 0.6-1.0 CM: CPT | Performed by: PHYSICIAN ASSISTANT

## 2020-07-13 NOTE — PROGRESS NOTES
Skin Biopsy Procedure Note  7/13/2020  Rukhsana Honeycutt  1980    /78   Pulse 67   Temp 98.2 °F (36.8 °C)   Ht 5' 2\" (1.575 m)   Wt 112 lb 12.8 oz (51.2 kg)   SpO2 98%   BMI 20.63 kg/m²   VITALS REVIEWED    Allergies   Allergen Reactions    Tramadol Other (See Comments) and Hives     Feels intoxicated for a long time  Keeps her up  Feels intoxicated for a long time    Ciprofloxacin     Vicodin [Hydrocodone-Acetaminophen] Other (See Comments)     Feels like bugs crawling all over    Percocet [Oxycodone-Acetaminophen] Nausea And Vomiting       Chief Complaint   Patient presents with    Other     wart tx. Lesion:  · 1. Right ring finger      Indication for Removal 1: verruca      Procedure: The lesion(s) was cleansed with Alcohol.  2% lidocaine with epinephrine was used for local anaesthesia. A 6 mm  shave was used to obtain a specimen. The biopsy site(s) was  cleansed and hemostasis using ACl and a pressure dressing(s) was achieved with good results. The patient was instructed on wound care. No complications occurred and the patient tolerated the procedure well. Diagnosis Orders   1. Verruca         Follow Up: Wound care discussed. Keep well moisturized. Watch for signs of infection which would include:  Redness, swelling, fever. If signs appear, please return to office. Once healed will freeze the base. Return in about 4 weeks (around 8/10/2020) for cyrotherapy for wart.        Electronically signed by Maxx Decker PA-C on 7/13/2020 at 4:32 PM

## 2020-07-23 ENCOUNTER — OFFICE VISIT (OUTPATIENT)
Dept: PRIMARY CARE CLINIC | Age: 40
End: 2020-07-23
Payer: COMMERCIAL

## 2020-07-23 VITALS
TEMPERATURE: 99.3 F | WEIGHT: 114 LBS | HEART RATE: 61 BPM | DIASTOLIC BLOOD PRESSURE: 82 MMHG | HEIGHT: 62 IN | OXYGEN SATURATION: 98 % | SYSTOLIC BLOOD PRESSURE: 118 MMHG | BODY MASS INDEX: 20.98 KG/M2

## 2020-07-23 PROCEDURE — G8427 DOCREV CUR MEDS BY ELIG CLIN: HCPCS | Performed by: PHYSICIAN ASSISTANT

## 2020-07-23 PROCEDURE — G8420 CALC BMI NORM PARAMETERS: HCPCS | Performed by: PHYSICIAN ASSISTANT

## 2020-07-23 PROCEDURE — 96372 THER/PROPH/DIAG INJ SC/IM: CPT | Performed by: PHYSICIAN ASSISTANT

## 2020-07-23 PROCEDURE — 4004F PT TOBACCO SCREEN RCVD TLK: CPT | Performed by: PHYSICIAN ASSISTANT

## 2020-07-23 PROCEDURE — 99213 OFFICE O/P EST LOW 20 MIN: CPT | Performed by: PHYSICIAN ASSISTANT

## 2020-07-23 RX ORDER — METHYLPREDNISOLONE ACETATE 80 MG/ML
80 INJECTION, SUSPENSION INTRA-ARTICULAR; INTRALESIONAL; INTRAMUSCULAR; SOFT TISSUE ONCE
Status: COMPLETED | OUTPATIENT
Start: 2020-07-23 | End: 2020-07-23

## 2020-07-23 RX ORDER — TRIAMCINOLONE ACETONIDE 5 MG/G
CREAM TOPICAL
Qty: 30 G | Refills: 0 | Status: SHIPPED | OUTPATIENT
Start: 2020-07-23 | End: 2021-08-09 | Stop reason: ALTCHOICE

## 2020-07-23 RX ADMIN — METHYLPREDNISOLONE ACETATE 80 MG: 80 INJECTION, SUSPENSION INTRA-ARTICULAR; INTRALESIONAL; INTRAMUSCULAR; SOFT TISSUE at 11:48

## 2020-07-23 ASSESSMENT — ENCOUNTER SYMPTOMS
EYES NEGATIVE: 1
RESPIRATORY NEGATIVE: 1
COLOR CHANGE: 0
ABDOMINAL PAIN: 0

## 2020-07-23 NOTE — PROGRESS NOTES
[facility]  [address]  Phone: [phone]  Fax: [phone]    Carol Cade is a 35 y.o. female who presents today for her medical conditions/complaintsas noted below. Chief Complaint   Patient presents with   [facility]     Patient thinks she may have poison ivy, patient is worried it is spreading to her right eye. HPI:     HPI  Rash in decollette and right thigh. Very itchy  Rash under right eye. Mild swelling and itchy    Current Outpatient Medications   Medication Sig Dispense Refill    triamcinolone (ARISTOCORT) 0.5 % cream Apply topically 3 times daily. 30 g 0    escitalopram (LEXAPRO) 10 MG tablet Take 1 tablet by mouth daily 30 tablet 3    cetirizine (ZYRTEC) 10 MG tablet Take 1 tablet by mouth daily 30 tablet 11    clobetasol (TEMOVATE) 0.05 % cream Apply topically 2 times daily. 60 g 0    traZODone (DESYREL) 50 MG tablet Take 1 tablet by mouth every evening      diclofenac (VOLTAREN) 50 MG EC tablet Take 1-2 tablets by mouth daily      tiZANidine (ZANAFLEX) 4 MG tablet Take 1 tablet by mouth daily      HYDROcodone-acetaminophen (NORCO) 5-325 MG per tablet Take 1 tablet by mouth nightly. Jabier Newman SUMAtriptan (IMITREX) 100 MG tablet Take 1 tablet by mouth once as needed for Migraine 10 tablet 1     Current Facility-Administered Medications   Medication Dose Route Frequency Provider Last Rate Last Dose    methylPREDNISolone acetate (DEPO-MEDROL) injection 80 mg  80 mg Intramuscular Once Domingo Juarez PA-C         Allergies   Allergen Reactions    Tramadol Other (See Comments) and Hives     Feels intoxicated for a long time  Keeps her up  Feels intoxicated for a long time    Ciprofloxacin     Vicodin [Hydrocodone-Acetaminophen] Other (See Comments)     Feels like bugs crawling all over    Percocet [Oxycodone-Acetaminophen] Nausea And Vomiting       Subjective:      Review of Systems   Constitutional: Negative

## 2020-08-03 ENCOUNTER — TELEPHONE (OUTPATIENT)
Dept: CARDIOLOGY | Age: 40
End: 2020-08-03

## 2020-08-03 NOTE — TELEPHONE ENCOUNTER
SCHEDULED ECHO FOR 08-04-20. REVIEWED COVID CHECKLIST WITH PATIENT.     Electronically signed by Humaira Edwards on 8/3/2020 at 1:47 PM

## 2020-08-04 ENCOUNTER — HOSPITAL ENCOUNTER (OUTPATIENT)
Dept: CARDIOLOGY | Age: 40
Discharge: HOME | End: 2020-08-04
Payer: COMMERCIAL

## 2020-08-04 LAB
LV EF: 65 %
LVEF MODALITY: NORMAL

## 2020-08-04 PROCEDURE — 93306 TTE W/DOPPLER COMPLETE: CPT

## 2020-08-11 ENCOUNTER — OFFICE VISIT (OUTPATIENT)
Dept: PRIMARY CARE CLINIC | Age: 40
End: 2020-08-11
Payer: COMMERCIAL

## 2020-08-11 VITALS
HEIGHT: 62 IN | HEART RATE: 64 BPM | SYSTOLIC BLOOD PRESSURE: 114 MMHG | BODY MASS INDEX: 20.65 KG/M2 | TEMPERATURE: 98.2 F | WEIGHT: 112.2 LBS | DIASTOLIC BLOOD PRESSURE: 80 MMHG | OXYGEN SATURATION: 98 %

## 2020-08-11 PROCEDURE — G8420 CALC BMI NORM PARAMETERS: HCPCS | Performed by: PHYSICIAN ASSISTANT

## 2020-08-11 PROCEDURE — G8427 DOCREV CUR MEDS BY ELIG CLIN: HCPCS | Performed by: PHYSICIAN ASSISTANT

## 2020-08-11 PROCEDURE — 99213 OFFICE O/P EST LOW 20 MIN: CPT | Performed by: PHYSICIAN ASSISTANT

## 2020-08-11 PROCEDURE — 4004F PT TOBACCO SCREEN RCVD TLK: CPT | Performed by: PHYSICIAN ASSISTANT

## 2020-08-11 RX ORDER — HYDROXYZINE HYDROCHLORIDE 25 MG/1
25 TABLET, FILM COATED ORAL EVERY 8 HOURS PRN
Qty: 30 TABLET | Refills: 0 | Status: SHIPPED | OUTPATIENT
Start: 2020-08-11 | End: 2020-08-21

## 2020-08-11 RX ORDER — PERMETHRIN 50 MG/G
CREAM TOPICAL
Qty: 60 G | Refills: 1 | Status: SHIPPED | OUTPATIENT
Start: 2020-08-11 | End: 2020-09-24

## 2020-08-11 ASSESSMENT — ENCOUNTER SYMPTOMS
COLOR CHANGE: 0
ABDOMINAL PAIN: 0
EYES NEGATIVE: 1
RESPIRATORY NEGATIVE: 1

## 2020-08-11 NOTE — PROGRESS NOTES
(See Comments)     Feels like bugs crawling all over    Percocet [Oxycodone-Acetaminophen] Nausea And Vomiting       Subjective:      Review of Systems   Constitutional: Negative for chills, diaphoresis, fever and unexpected weight change. HENT: Negative. Negative for mouth sores. Eyes: Negative. Respiratory: Negative. Cardiovascular: Negative. Gastrointestinal: Negative for abdominal pain. Musculoskeletal: Negative for arthralgias and myalgias. Skin: Negative for color change, pallor, rash and wound. Allergic/Immunologic: Negative for environmental allergies, food allergies and immunocompromised state. Hematological: Negative for adenopathy. Objective:     /80   Pulse 64   Temp 98.2 °F (36.8 °C)   Ht 5' 2\" (1.575 m)   Wt 112 lb 3.2 oz (50.9 kg)   SpO2 98%   BMI 20.52 kg/m²   Physical Exam  Constitutional:       Appearance: Normal appearance. She is not ill-appearing. Skin:     Findings: Rash present. Rash is macular and papular. Rash is not pustular or vesicular. Comments: Papular with burrows and some reddened areas that do not radha   Neurological:      Mental Status: She is alert. Assessment:       Diagnosis Orders   1. Scabies  permethrin (ELIMITE) 5 % cream        Plan:    Wash all clothes  Discussed how to use the permethrin  If not moving to her folds, then RTO for Depo Medrol shot and oral prednisone. No follow-ups on file. No orders of the defined types were placed in this encounter. Orders Placed This Encounter   Medications    permethrin (ELIMITE) 5 % cream     Sig: Cover body from ears to toes, getting between fingers and toes and wear 10-12 hours before completely washing off.  May repeat once in 1 week     Dispense:  60 g     Refill:  1    hydrOXYzine (ATARAX) 25 MG tablet     Sig: Take 1 tablet by mouth every 8 hours as needed for Itching     Dispense:  30 tablet     Refill:  0           Electronically signed by Nikki Otto PA-Con 8/11/2020 at 4:42 PM

## 2020-08-12 ENCOUNTER — PATIENT MESSAGE (OUTPATIENT)
Dept: PRIMARY CARE CLINIC | Age: 40
End: 2020-08-12

## 2020-08-12 NOTE — TELEPHONE ENCOUNTER
From: Zuly Hampton  To: Eric Valencia PA-C  Sent: 8/12/2020 9:45 AM EDT  Subject: Visit Follow-Up Question    Hot to the touch and definitely turning into a blister.

## 2020-08-13 RX ORDER — PREDNISONE 20 MG/1
20 TABLET ORAL 2 TIMES DAILY
Qty: 10 TABLET | Refills: 0 | Status: SHIPPED | OUTPATIENT
Start: 2020-08-13 | End: 2020-08-18

## 2020-08-25 ENCOUNTER — OFFICE VISIT (OUTPATIENT)
Dept: PRIMARY CARE CLINIC | Age: 40
End: 2020-08-25
Payer: COMMERCIAL

## 2020-08-25 VITALS
WEIGHT: 109.6 LBS | OXYGEN SATURATION: 98 % | HEIGHT: 62 IN | SYSTOLIC BLOOD PRESSURE: 120 MMHG | BODY MASS INDEX: 20.17 KG/M2 | HEART RATE: 76 BPM | TEMPERATURE: 98.4 F | DIASTOLIC BLOOD PRESSURE: 68 MMHG

## 2020-08-25 PROBLEM — F41.9 ANXIETY: Status: ACTIVE | Noted: 2020-08-25

## 2020-08-25 PROCEDURE — 4004F PT TOBACCO SCREEN RCVD TLK: CPT | Performed by: PHYSICIAN ASSISTANT

## 2020-08-25 PROCEDURE — 17110 DESTRUCTION B9 LES UP TO 14: CPT | Performed by: PHYSICIAN ASSISTANT

## 2020-08-25 PROCEDURE — 99212 OFFICE O/P EST SF 10 MIN: CPT | Performed by: PHYSICIAN ASSISTANT

## 2020-08-25 PROCEDURE — G8420 CALC BMI NORM PARAMETERS: HCPCS | Performed by: PHYSICIAN ASSISTANT

## 2020-08-25 PROCEDURE — G8427 DOCREV CUR MEDS BY ELIG CLIN: HCPCS | Performed by: PHYSICIAN ASSISTANT

## 2020-08-25 ASSESSMENT — ENCOUNTER SYMPTOMS
SHORTNESS OF BREATH: 0
VOMITING: 0
BACK PAIN: 0
NAUSEA: 0

## 2020-08-25 NOTE — PROGRESS NOTES
[facility] PRIMARY CARE  [address] [address]  Dept: [phone] Krystal Mendoza is a 35 y.o. female who presents today for her medical conditions/complaintsas noted below. Chief Complaint   Patient presents with    Medication Check     Pt said the Lexapro is working.  Other     Wart tx, right ring finger. HPI:     HPI  Lexapro is much better for her than the Wellbutrin. No side effects. Rechecking wart today and possibly a freeze. Looks like it has grown since the shave. No results found for: LDLCHOLESTEROL, LDLCALC    (goal LDL is <100)   No results found for: AST, ALT, BUN  BP Readings from Last 3 Encounters:   08/25/20 120/68   08/11/20 114/80   07/23/20 118/82          (goal 120/80)    Past Medical History:   Diagnosis Date    Abdominal pain     feels crampy/colicky    Abnormal CT of the abdomen     Acute upper respiratory infection     Coccyx contusion     Fractured coccyx (HCC)     H/O acute pharyngitis     H/O acute sinusitis     H/O contact dermatitis     H/O nausea and vomiting     H/O pyelonephritis     Lump of skin       Past Surgical History:   Procedure Laterality Date    HYSTEROSCOPY      with endometrial ablation    KNEE ARTHROSCOPY      OTHER SURGICAL HISTORY Right 12/19/2016    right sided lumbar facet steroid injection    OTHER SURGICAL HISTORY  01/12/2017    lumbar medial nerve block L2-L5    PARTIAL HYSTERECTOMY      TUBAL LIGATION         Family History   Problem Relation Age of Onset    Heart Disease Mother     High Blood Pressure Mother     High Cholesterol Mother     Arthritis Father     Other Maternal Grandmother         Mantel cell lymphoma    Other Paternal Aunt         oral cancer       Social History     Tobacco Use    Smoking status: Current Every Day Smoker     Types: Cigarettes    Smokeless tobacco: Never Used   Substance Use Topics    Alcohol use:  Yes Alcohol/week: 0.0 standard drinks     Comment: occ. Current Outpatient Medications   Medication Sig Dispense Refill    permethrin (ELIMITE) 5 % cream Cover body from ears to toes, getting between fingers and toes and wear 10-12 hours before completely washing off. May repeat once in 1 week 60 g 1    triamcinolone (ARISTOCORT) 0.5 % cream Apply topically 3 times daily. 30 g 0    escitalopram (LEXAPRO) 10 MG tablet Take 1 tablet by mouth daily 30 tablet 3    cetirizine (ZYRTEC) 10 MG tablet Take 1 tablet by mouth daily 30 tablet 11    clobetasol (TEMOVATE) 0.05 % cream Apply topically 2 times daily. 60 g 0    diclofenac (VOLTAREN) 50 MG EC tablet Take 1-2 tablets by mouth daily      tiZANidine (ZANAFLEX) 4 MG tablet Take 1 tablet by mouth daily      HYDROcodone-acetaminophen (NORCO) 5-325 MG per tablet Take 1 tablet by mouth nightly. Steve Lanier SUMAtriptan (IMITREX) 100 MG tablet Take 1 tablet by mouth once as needed for Migraine 10 tablet 1     No current facility-administered medications for this visit. Allergies   Allergen Reactions    Tramadol Other (See Comments) and Hives     Feels intoxicated for a long time  Keeps her up  Feels intoxicated for a long time    Ciprofloxacin     Vicodin [Hydrocodone-Acetaminophen] Other (See Comments)     Feels like bugs crawling all over    Percocet [Oxycodone-Acetaminophen] Nausea And Vomiting       Health Maintenance   Topic Date Due    HIV screen  12/14/1994    Cervical cancer screen  12/14/2000    Lipid screen  12/14/2019    Flu vaccine (1) 09/01/2020    DTaP/Tdap/Td vaccine (3 - Td) 04/19/2027    Pneumococcal 0-64 years Vaccine  Completed    Hepatitis A vaccine  Aged Out    Hepatitis B vaccine  Aged Out    Hib vaccine  Aged Out    Meningococcal (ACWY) vaccine  Aged Out       Subjective:      Review of Systems   Constitutional: Negative for activity change, appetite change and fatigue. Respiratory: Negative for shortness of breath. Cardiovascular: Negative for chest pain. Gastrointestinal: Negative for nausea and vomiting. Endocrine: Negative for cold intolerance and heat intolerance. Musculoskeletal: Negative for arthralgias, back pain, myalgias and neck pain. Neurological: Negative for dizziness, seizures, syncope, weakness, light-headedness and headaches. Psychiatric/Behavioral: Positive for sleep disturbance (trazodone is not helping). Negative for agitation, behavioral problems, confusion, decreased concentration, dysphoric mood, hallucinations, self-injury and suicidal ideas. The patient is not nervous/anxious and is not hyperactive. Objective:     /68   Pulse 76   Temp 98.4 °F (36.9 °C)   Ht 5' 2\" (1.575 m)   Wt 109 lb 9.6 oz (49.7 kg)   SpO2 98%   BMI 20.05 kg/m²   Physical Exam  Vitals signs and nursing note reviewed. Constitutional:       Appearance: Normal appearance. Skin:     Comments: Wart on ring finger of right hand   Neurological:      Mental Status: She is alert. Psychiatric:         Mood and Affect: Mood normal.         Behavior: Behavior normal.         Thought Content: Thought content normal.         Judgment: Judgment normal.         Assessment:       Diagnosis Orders   1. Verruca  31888 - [place] DESTRUCTION BENIGN LESIONS UP TO 14   2. Anxiety          Plan: Tolerated the Verruca Freeze treatment  Continue Lexapro   D/C Trazodone. May need to use Lunesta or Belsomra     Return in about 4 weeks (around 9/22/2020) for recheck and treat wart . Orders Placed This Encounter   Procedures    21440 - [place] DESTRUCTION BENIGN LESIONS UP TO 14     No orders of the defined types were placed in this encounter. Patient given educationalmaterials - see patient instructions. Discussed use, benefit, and side effectsof prescribed medications. All patient questions answered. Pt voiced understanding. Reviewed health maintenance. Instructed to continue current medications, diet andexercise. Patient agreed with treatment plan. Follow up as directed.      Electronicallysigned by Zayda Chaney PA-C on 8/25/2020 at 4:34 PM

## 2020-08-25 NOTE — PROGRESS NOTES
Wart destruction procedure note:    Verbal consent was obtained. Wart(s) cleaned. Location:4th finger right hand  Number of warts: 1  Pared with 15 scalpel: Yes  Number pared with scalpel: 1  Cryotherapy applied with applicator. Number of freeze/thaw cycles: 2  Wound care discussed. Diagnosis Orders   1.  Andrea  [address]681 - [place] DESTRUCTION BENIGN LESIONS UP TO 14

## 2020-09-01 RX ORDER — SUMATRIPTAN 100 MG/1
100 TABLET, FILM COATED ORAL
Qty: 9 TABLET | Refills: 2 | Status: SHIPPED | OUTPATIENT
Start: 2020-09-01 | End: 2021-08-09 | Stop reason: ALTCHOICE

## 2020-09-24 ENCOUNTER — OFFICE VISIT (OUTPATIENT)
Dept: PRIMARY CARE CLINIC | Age: 40
End: 2020-09-24
Payer: COMMERCIAL

## 2020-09-24 VITALS
OXYGEN SATURATION: 98 % | WEIGHT: 109 LBS | TEMPERATURE: 97.7 F | BODY MASS INDEX: 20.06 KG/M2 | HEIGHT: 62 IN | SYSTOLIC BLOOD PRESSURE: 112 MMHG | HEART RATE: 75 BPM | DIASTOLIC BLOOD PRESSURE: 68 MMHG

## 2020-09-24 PROCEDURE — 99212 OFFICE O/P EST SF 10 MIN: CPT | Performed by: PHYSICIAN ASSISTANT

## 2020-09-24 PROCEDURE — G8427 DOCREV CUR MEDS BY ELIG CLIN: HCPCS | Performed by: PHYSICIAN ASSISTANT

## 2020-09-24 PROCEDURE — 4004F PT TOBACCO SCREEN RCVD TLK: CPT | Performed by: PHYSICIAN ASSISTANT

## 2020-09-24 PROCEDURE — 17110 DESTRUCTION B9 LES UP TO 14: CPT | Performed by: PHYSICIAN ASSISTANT

## 2020-09-24 PROCEDURE — G8420 CALC BMI NORM PARAMETERS: HCPCS | Performed by: PHYSICIAN ASSISTANT

## 2020-09-24 RX ORDER — ALBUTEROL SULFATE 90 UG/1
2 AEROSOL, METERED RESPIRATORY (INHALATION) 4 TIMES DAILY PRN
Qty: 1 INHALER | Refills: 0 | Status: SHIPPED | OUTPATIENT
Start: 2020-09-24 | End: 2020-11-16 | Stop reason: SDUPTHER

## 2020-09-24 RX ORDER — KETOCONAZOLE 20 MG/G
CREAM TOPICAL
Qty: 15 G | Refills: 0 | Status: SHIPPED | OUTPATIENT
Start: 2020-09-24 | End: 2021-08-09 | Stop reason: ALTCHOICE

## 2020-09-24 ASSESSMENT — ENCOUNTER SYMPTOMS
RESPIRATORY NEGATIVE: 1
COLOR CHANGE: 0
EYES NEGATIVE: 1
ABDOMINAL PAIN: 0

## 2020-09-24 NOTE — PROGRESS NOTES
Wart destruction procedure note:    Verbal consent was obtained. Wart(s) cleaned. Location: right ring finger  Number of warts: 1  Pared with 15 scalpel: No  Number pared with scalpel: none  Cryotherapy applied with applicator. Number of freeze/thaw cycles: 2  Wound care discussed. Diagnosis Orders   1. Tinea corporis  ketoconazole (NIZORAL) 2 % cream   2. Mild intermittent reactive airway disease without complication  albuterol sulfate HFA (VENTOLIN HFA) 108 (90 Base) MCG/ACT inhaler   3. Verruca  [address]503 - [place] DESTRUCTION BENIGN LESIONS UP  [facility] Primary Care  [address]  Phone: [phone]  Fax: [phone]    Adis Maynard is a 35 y.o. female who presents today for her medical conditions/complaintsas noted below. Chief Complaint   Patient presents with    1 Month Follow-Up     wart check    Eczema     dry skin on right palm         HPI:     HPI  Noticed a crust in palm of right hand on instep of left foot about 1 week ago    Also having some difficulty breathing with her fall allergies. Asks for a rescue inhaler. TAkes zyrtec seasonally. Current Outpatient Medications   Medication Sig Dispense Refill    ketoconazole (NIZORAL) 2 % cream Apply topically daily. 15 g 0    albuterol sulfate HFA (VENTOLIN HFA) 108 (90 Base) MCG/ACT inhaler Inhale 2 puffs into the lungs 4 times daily as needed for Wheezing 1 Inhaler 0    triamcinolone (ARISTOCORT) 0.5 % cream Apply topically 3 times daily. 30 g 0    escitalopram (LEXAPRO) 10 MG tablet Take 1 tablet by mouth daily 30 tablet 3    cetirizine (ZYRTEC) 10 MG tablet Take 1 tablet by mouth daily 30 tablet 11    clobetasol (TEMOVATE) 0.05 % cream Apply topically 2 times daily.  60 g 0    diclofenac (VOLTAREN) 50 MG EC tablet Take 1-2 tablets by mouth daily      tiZANidine (ZANAFLEX) 4 MG tablet Take 1 tablet by mouth daily      HYDROcodone-acetaminophen (NORCO) 5-325 MG per tablet Take 1 tablet by mouth nightly. Sharon Ying SUMAtriptan (IMITREX) 100 MG tablet TAKE 1 TABLET BY MOUTH ONCE AS NEEDED FOR MIGRAINE (Patient not taking: Reported on 9/24/2020) 9 tablet 2     No current facility-administered medications for this visit. Allergies   Allergen Reactions    Tramadol Other (See Comments) and Hives     Feels intoxicated for a long time  Keeps her up  Feels intoxicated for a long time    Ciprofloxacin     Vicodin [Hydrocodone-Acetaminophen] Other (See Comments)     Feels like bugs crawling all over    Percocet [Oxycodone-Acetaminophen] Nausea And Vomiting       Subjective:      Review of Systems   Constitutional: Negative for chills, diaphoresis, fever and unexpected weight change. HENT: Negative. Negative for mouth sores. Eyes: Negative. Respiratory: Negative. Cardiovascular: Negative. Gastrointestinal: Negative for abdominal pain. Musculoskeletal: Negative for arthralgias and myalgias. Skin: Negative for color change, pallor, rash and wound. Allergic/Immunologic: Negative for environmental allergies, food allergies and immunocompromised state. Hematological: Negative for adenopathy. Objective:     /68   Pulse 75   Temp 97.7 °F (36.5 °C)   Ht 5' 2\" (1.575 m)   Wt 109 lb (49.4 kg)   SpO2 98%   BMI 19.94 kg/m²   Physical Exam  Vitals signs and nursing note reviewed. Constitutional:       Appearance: Normal appearance. Cardiovascular:      Rate and Rhythm: Normal rate and regular rhythm. Heart sounds: Normal heart sounds. Pulmonary:      Effort: Pulmonary effort is normal.      Breath sounds: Normal breath sounds. Skin:     Comments: Has a crust over red macule on palm of right hand. Has a well demarcated red rash on instep in left foot. Also right rink finger showing a small area of wart still    Neurological:      Mental Status: She is alert. Assessment:       Diagnosis Orders   1.  Tinea corporis  ketoconazole (NIZORAL) 2 % cream 2. Mild intermittent reactive airway disease without complication  albuterol sulfate HFA (VENTOLIN HFA) 108 (90 Base) MCG/ACT inhaler   3. Verruca  32063 - [place] DESTRUCTION BENIGN LESIONS UP TO 14        Plan:    Final wart treatment  Antifungal for hand and foot  REscue inhaler for PRN use. Return if symptoms worsen or fail to improve. Orders Placed This Encounter   Procedures    63876 - [place] DESTRUCTION BENIGN LESIONS UP TO 14     Orders Placed This Encounter   Medications    ketoconazole (NIZORAL) 2 % cream     Sig: Apply topically daily.      Dispense:  15 g     Refill:  0    albuterol sulfate HFA (VENTOLIN HFA) 108 (90 Base) MCG/ACT inhaler     Sig: Inhale 2 puffs into the lungs 4 times daily as needed for Wheezing     Dispense:  1 Inhaler     Refill:  0           Electronically signed by Mitzy King 9/24/2020 at 4:23 PM

## 2020-10-26 RX ORDER — ESCITALOPRAM OXALATE 10 MG/1
TABLET ORAL
Qty: 90 TABLET | Refills: 1 | Status: SHIPPED | OUTPATIENT
Start: 2020-10-26 | End: 2021-08-09 | Stop reason: ALTCHOICE

## 2020-11-03 PROBLEM — M47.816 FACET ARTHRITIS OF LUMBAR REGION: Status: RESOLVED | Noted: 2017-01-05 | Resolved: 2020-11-03

## 2020-11-10 ENCOUNTER — HOSPITAL ENCOUNTER (OUTPATIENT)
Age: 40
Setting detail: SPECIMEN
Discharge: HOME | End: 2020-11-10
Payer: COMMERCIAL

## 2020-11-10 ENCOUNTER — TELEPHONE (OUTPATIENT)
Dept: PRIMARY CARE CLINIC | Age: 40
End: 2020-11-10

## 2020-11-11 ENCOUNTER — OFFICE VISIT (OUTPATIENT)
Dept: PRIMARY CARE CLINIC | Age: 40
End: 2020-11-11
Payer: COMMERCIAL

## 2020-11-11 PROCEDURE — G8420 CALC BMI NORM PARAMETERS: HCPCS | Performed by: NURSE PRACTITIONER

## 2020-11-11 PROCEDURE — 99211 OFF/OP EST MAY X REQ PHY/QHP: CPT | Performed by: NURSE PRACTITIONER

## 2020-11-11 PROCEDURE — G8428 CUR MEDS NOT DOCUMENT: HCPCS | Performed by: NURSE PRACTITIONER

## 2020-11-11 NOTE — PROGRESS NOTES
Pt presented with order from PCP for COVID-19 testing. Sample collected by Maya Salinas MA and sent to the lab.

## 2020-11-13 LAB — SARS-COV-2, NAA: NOT DETECTED

## 2021-02-10 ENCOUNTER — HOSPITAL ENCOUNTER (OUTPATIENT)
Dept: ULTRASOUND IMAGING | Age: 41
Discharge: HOME | End: 2021-02-12
Payer: COMMERCIAL

## 2021-02-10 DIAGNOSIS — E06.3 CHRONIC LYMPHOCYTIC THYROIDITIS: ICD-10-CM

## 2021-02-10 PROCEDURE — 76536 US EXAM OF HEAD AND NECK: CPT

## 2021-07-26 ENCOUNTER — OFFICE VISIT (OUTPATIENT)
Dept: PRIMARY CARE CLINIC | Age: 41
End: 2021-07-26
Payer: COMMERCIAL

## 2021-07-26 VITALS
SYSTOLIC BLOOD PRESSURE: 112 MMHG | WEIGHT: 112.8 LBS | HEART RATE: 66 BPM | DIASTOLIC BLOOD PRESSURE: 76 MMHG | BODY MASS INDEX: 20.63 KG/M2 | OXYGEN SATURATION: 99 %

## 2021-07-26 DIAGNOSIS — R06.2 WHEEZING: ICD-10-CM

## 2021-07-26 DIAGNOSIS — R53.83 LISTLESSNESS: ICD-10-CM

## 2021-07-26 DIAGNOSIS — R06.03 RESPIRATORY DISTRESS: Primary | ICD-10-CM

## 2021-07-26 PROCEDURE — 4004F PT TOBACCO SCREEN RCVD TLK: CPT | Performed by: PHYSICIAN ASSISTANT

## 2021-07-26 PROCEDURE — 99214 OFFICE O/P EST MOD 30 MIN: CPT | Performed by: PHYSICIAN ASSISTANT

## 2021-07-26 PROCEDURE — G8420 CALC BMI NORM PARAMETERS: HCPCS | Performed by: PHYSICIAN ASSISTANT

## 2021-07-26 PROCEDURE — G8427 DOCREV CUR MEDS BY ELIG CLIN: HCPCS | Performed by: PHYSICIAN ASSISTANT

## 2021-07-26 RX ORDER — LORAZEPAM 1 MG/1
1 TABLET ORAL EVERY 6 HOURS PRN
COMMUNITY
End: 2021-09-09

## 2021-07-26 SDOH — ECONOMIC STABILITY: FOOD INSECURITY: WITHIN THE PAST 12 MONTHS, YOU WORRIED THAT YOUR FOOD WOULD RUN OUT BEFORE YOU GOT MONEY TO BUY MORE.: NEVER TRUE

## 2021-07-26 SDOH — ECONOMIC STABILITY: FOOD INSECURITY: WITHIN THE PAST 12 MONTHS, THE FOOD YOU BOUGHT JUST DIDN'T LAST AND YOU DIDN'T HAVE MONEY TO GET MORE.: NEVER TRUE

## 2021-07-26 ASSESSMENT — ENCOUNTER SYMPTOMS
SHORTNESS OF BREATH: 1
TROUBLE SWALLOWING: 1
ABDOMINAL DISTENTION: 0
NAUSEA: 1
STRIDOR: 1
SORE THROAT: 1
COUGH: 1
DIARRHEA: 0

## 2021-07-26 ASSESSMENT — PATIENT HEALTH QUESTIONNAIRE - PHQ9
SUM OF ALL RESPONSES TO PHQ9 QUESTIONS 1 & 2: 0
1. LITTLE INTEREST OR PLEASURE IN DOING THINGS: 0
SUM OF ALL RESPONSES TO PHQ QUESTIONS 1-9: 0
SUM OF ALL RESPONSES TO PHQ QUESTIONS 1-9: 0
2. FEELING DOWN, DEPRESSED OR HOPELESS: 0
SUM OF ALL RESPONSES TO PHQ QUESTIONS 1-9: 0

## 2021-07-26 ASSESSMENT — SOCIAL DETERMINANTS OF HEALTH (SDOH): HOW HARD IS IT FOR YOU TO PAY FOR THE VERY BASICS LIKE FOOD, HOUSING, MEDICAL CARE, AND HEATING?: NOT HARD AT ALL

## 2021-07-26 NOTE — PROGRESS NOTES
[address] [place] [address] CARE  [address] [place] [id number]  Dept: [phone]    Joseline Vyas is a 38 y.o. female who presents today for her medical conditions/complaints as noted below. Chief Complaint   Patient presents with    Shortness of Breath     Patient admitted to [facility] due to SOB. D/C 7/25/21       HPI:     HPI     C/o Dyspnea: Presented to [facility] ER on Wednesday for SOB. Left with frustration with no treatment for unspecified pharyngitis. Presented to [facility] same day and was diagnosed with Laryngitis and sent home with prednisone and Keflex. The next day she presented back to [facility] and was admitted for IV abx, steroids and respiratory support. She was intubated for 4 hours. EGD showed Laryngitis. She tested Negative for COVID and Strep. She was discharged home on Ativan and PRN Albuterol. Respiratory effort is still increased with audible wheezing. Used albuterol x 2 on way to office with no relief. She did not finish the Keflex or Prednisone. Has not taken any medications, including Ativan, since her discharge.             No results found for: LDLCHOLESTEROL, LDLCALC    (goal LDL is <100)   No results found for: AST, ALT, BUN  BP Readings from Last 3 Encounters:   07/26/21 112/76   09/24/20 112/68   08/25/20 120/68          (goal 120/80)    Past Medical History:   Diagnosis Date    Abdominal pain     feels crampy/colicky    Abnormal CT of the abdomen     Acute upper respiratory infection     Coccyx contusion     Fractured coccyx ([address]     H/O acute pharyngitis     H/O acute sinusitis     H/O contact dermatitis     H/O nausea and vomiting     H/O pyelonephritis     Lump of skin       Past Surgical History:   Procedure Laterality Date    HYSTEROSCOPY      with endometrial ablation    KNEE ARTHROSCOPY      OTHER SURGICAL HISTORY Right 12/19/2016    right sided lumbar facet steroid injection    OTHER SURGICAL HISTORY  01/12/2017    lumbar medial nerve block L2-L5    PARTIAL HYSTERECTOMY      TUBAL LIGATION         Family History   Problem Relation Age of Onset    Heart Disease Mother     High Blood Pressure Mother     High Cholesterol Mother     Arthritis Father     Other Maternal Grandmother         Mantel cell lymphoma    Other Paternal Aunt         oral cancer       Social History     Tobacco Use    Smoking status: Current Every Day Smoker     Types: Cigarettes    Smokeless tobacco: Never Used   Substance Use Topics    Alcohol use: Yes     Alcohol/week: 0.0 standard drinks     Comment: occ. Current Outpatient Medications   Medication Sig Dispense Refill    LORazepam (ATIVAN) 1 MG tablet Take 1 mg by mouth every 6 hours as needed for Anxiety.  albuterol sulfate HFA (VENTOLIN HFA) 108 (90 Base) MCG/ACT inhaler Inhale 2 puffs into the lungs 4 times daily as needed for Wheezing 1 Inhaler 2    escitalopram (LEXAPRO) 10 MG tablet TAKE 1 TABLET BY MOUTH EVERY DAY (Patient not taking: Reported on 7/26/2021) 90 tablet 1    ketoconazole (NIZORAL) 2 % cream Apply topically daily. (Patient not taking: Reported on 7/26/2021) 15 g 0    SUMAtriptan (IMITREX) 100 MG tablet TAKE 1 TABLET BY MOUTH ONCE AS NEEDED FOR MIGRAINE (Patient not taking: Reported on 9/24/2020) 9 tablet 2    triamcinolone (ARISTOCORT) 0.5 % cream Apply topically 3 times daily. (Patient not taking: Reported on 7/26/2021) 30 g 0    cetirizine (ZYRTEC) 10 MG tablet Take 1 tablet by mouth daily (Patient not taking: Reported on 7/26/2021) 30 tablet 11    clobetasol (TEMOVATE) 0.05 % cream Apply topically 2 times daily.  (Patient not taking: Reported on 7/26/2021) 60 g 0    diclofenac (VOLTAREN) 50 MG EC tablet Take 1-2 tablets by mouth daily (Patient not taking: Reported on 7/26/2021)      tiZANidine (ZANAFLEX) 4 MG tablet Take 1 tablet by mouth daily (Patient not taking: Reported on 7/26/2021)      HYDROcodone-acetaminophen (NORCO) 5-325 MG per tablet Take 1 tablet by mouth nightly. . (Patient not taking: Reported on 7/26/2021)       No current facility-administered medications for this visit. Allergies   Allergen Reactions    Tramadol Other (See Comments) and Hives     Feels intoxicated for a long time  Keeps her up  Feels intoxicated for a long time    Ciprofloxacin     Vicodin [Hydrocodone-Acetaminophen] Other (See Comments)     Feels like bugs crawling all over    Percocet [Oxycodone-Acetaminophen] Nausea And Vomiting       Health Maintenance   Topic Date Due    Hepatitis C screen  Never done    COVID-19 Vaccine (1) Never done    HIV screen  Never done    Cervical cancer screen  Never done    Lipid screen  Never done    Flu vaccine (1) 09/01/2021    DTaP/Tdap/Td vaccine (3 - Td or Tdap) 04/19/2027    Pneumococcal 0-64 years Vaccine (2 of 2 - PPSV23) 12/14/2044    Hepatitis A vaccine  Aged Out    Hepatitis B vaccine  Aged Out    Hib vaccine  Aged Out    Meningococcal (ACWY) vaccine  Aged Out       Subjective:      Review of Systems   Constitutional: Positive for diaphoresis. Negative for chills and fever. HENT: Positive for sore throat and trouble swallowing. Respiratory: Positive for cough, shortness of breath and stridor. Cardiovascular: Positive for chest pain (Burning). Negative for leg swelling. Gastrointestinal: Positive for nausea. Negative for abdominal distention and diarrhea. Neurological: Positive for dizziness, light-headedness and headaches. Objective:     /76   Pulse 66   Wt 112 lb 12.8 oz (51.2 kg)   SpO2 99%   BMI 20.63 kg/m²   Physical Exam  Constitutional:       General: She is in acute distress. Appearance: She is ill-appearing. She is not diaphoretic. Cardiovascular:      Rate and Rhythm: Normal rate and regular rhythm. Pulses: Normal pulses. Heart sounds: Normal heart sounds. Pulmonary:      Effort: Respiratory distress present.       Breath sounds: Stridor present. Wheezing present. Comments: Increased Pulmonary Effort  Unable to Speak Full Sentences      We do not have albuterol aerosols available at office. Assessment:       Diagnosis Orders   1. Respiratory distress     2. Wheezing     3. Listlessness          Plan:    Recommended daughter take patient to the ER. Return in about 1 week (around 8/2/2021) for F/U in one week-1/2 hour. No orders of the defined types were placed in this encounter. No orders of the defined types were placed in this encounter. Patient given educational materials - see patient instructions. Discussed use, benefit, and side effects of prescribed medications. All patient questions answered. Pt voiced understanding. Reviewed health maintenance. Instructed to continue current medications, diet and exercise. Patient agreed with treatment plan. Follow up as directed.      Electronically signed by Carolyne Munson PA-C on 7/26/2021 at 4:42 PM

## 2021-07-28 DIAGNOSIS — R06.03 RESPIRATORY DISTRESS: Primary | ICD-10-CM

## 2021-07-28 DIAGNOSIS — J45.20 MILD INTERMITTENT REACTIVE AIRWAY DISEASE WITHOUT COMPLICATION: ICD-10-CM

## 2021-07-28 DIAGNOSIS — R06.2 WHEEZING: ICD-10-CM

## 2021-08-05 ENCOUNTER — TELEPHONE (OUTPATIENT)
Dept: PRIMARY CARE CLINIC | Age: 41
End: 2021-08-05

## 2021-08-05 NOTE — TELEPHONE ENCOUNTER
----- Message from Kin Goldman sent at 8/5/2021 12:52 PM EDT -----  Subject: Message to Provider    QUESTIONS  Information for Provider? Patient went to the Er Aug 4th for bowel   inflammed and need Er visit f/u which she made for Monday but if could   come in tomorrow. Also still have symptoms from the breathing issues she   went to the Er before for. cb pt about all this to discuss. She still has   to use inhaler alot. could not come in today.   ---------------------------------------------------------------------------  --------------  CALL BACK INFO  What is the best way for the office to contact you? OK to leave message on   voicemail  Preferred Call Back Phone Number? [phone]  ---------------------------------------------------------------------------  --------------  SCRIPT ANSWERS  Relationship to Patient?  Self

## 2021-08-06 NOTE — TELEPHONE ENCOUNTER
Unfortunately, I do not have anything open today.   Please check to make sure Monday's appt is 1/2 hour

## 2021-08-09 ENCOUNTER — OFFICE VISIT (OUTPATIENT)
Dept: PRIMARY CARE CLINIC | Age: 41
End: 2021-08-09
Payer: COMMERCIAL

## 2021-08-09 VITALS
WEIGHT: 116 LBS | BODY MASS INDEX: 21.35 KG/M2 | DIASTOLIC BLOOD PRESSURE: 74 MMHG | HEART RATE: 69 BPM | SYSTOLIC BLOOD PRESSURE: 112 MMHG | HEIGHT: 62 IN | OXYGEN SATURATION: 98 %

## 2021-08-09 DIAGNOSIS — R93.89 ABNORMAL CT SCAN: ICD-10-CM

## 2021-08-09 DIAGNOSIS — J45.40 MODERATE PERSISTENT ASTHMA WITHOUT COMPLICATION: ICD-10-CM

## 2021-08-09 DIAGNOSIS — R06.2 WHEEZING: ICD-10-CM

## 2021-08-09 DIAGNOSIS — N12 PYELONEPHRITIS: Primary | ICD-10-CM

## 2021-08-09 DIAGNOSIS — R06.02 SHORTNESS OF BREATH: ICD-10-CM

## 2021-08-09 DIAGNOSIS — J30.2 SEASONAL ALLERGIC RHINITIS, UNSPECIFIED TRIGGER: ICD-10-CM

## 2021-08-09 PROBLEM — G89.4 CHRONIC PAIN SYNDROME: Status: ACTIVE | Noted: 2020-11-20

## 2021-08-09 PROBLEM — J45.909 MODERATE ASTHMA: Status: ACTIVE | Noted: 2021-08-09

## 2021-08-09 PROCEDURE — 99214 OFFICE O/P EST MOD 30 MIN: CPT | Performed by: PHYSICIAN ASSISTANT

## 2021-08-09 PROCEDURE — 4004F PT TOBACCO SCREEN RCVD TLK: CPT | Performed by: PHYSICIAN ASSISTANT

## 2021-08-09 PROCEDURE — G8427 DOCREV CUR MEDS BY ELIG CLIN: HCPCS | Performed by: PHYSICIAN ASSISTANT

## 2021-08-09 PROCEDURE — G8420 CALC BMI NORM PARAMETERS: HCPCS | Performed by: PHYSICIAN ASSISTANT

## 2021-08-09 RX ORDER — SULFAMETHOXAZOLE AND TRIMETHOPRIM 800; 160 MG/1; MG/1
1 TABLET ORAL 2 TIMES DAILY
COMMUNITY
Start: 2021-08-05 | End: 2021-08-09 | Stop reason: ALTCHOICE

## 2021-08-09 RX ORDER — NITROFURANTOIN 25; 75 MG/1; MG/1
100 CAPSULE ORAL 2 TIMES DAILY
Qty: 20 CAPSULE | Refills: 0 | Status: SHIPPED | OUTPATIENT
Start: 2021-08-09 | End: 2021-08-19

## 2021-08-09 ASSESSMENT — ENCOUNTER SYMPTOMS
SHORTNESS OF BREATH: 1
NAUSEA: 0
CONSTIPATION: 0
BACK PAIN: 1
BLOOD IN STOOL: 1
COUGH: 1
EYES NEGATIVE: 1
DIARRHEA: 0
ABDOMINAL PAIN: 1
WHEEZING: 1
VOMITING: 0

## 2021-08-09 NOTE — PROGRESS NOTES
[facility] CARE  [address] [place] [id number]  Dept: [phone]    Braulio Anglin is a 38 y.o. female who presents today for her medical conditions/complaints as noted below. Chief Complaint   Patient presents with    Pyelonephritis     patient went to [facility] ER on 8/4/21 and was dx with a kidney infection. Patient was started on Bactrim, patient said the bactrim upsets her stomach and asked if a different abx could be called in    Referral - General     Pts CT showed possible inflammatory bowel disease. Patient is asking for referral to have colonoscopy. HPI:     HPI  Can't continue the Bactrim due to upset stomach. Does get mucus bloody stools  CT scan showed possible IBD    Nebulizer tx helps the SOB. Coughing all the time.         No results found for: LDLCHOLESTEROL, LDLCALC    (goal LDL is <100)   No results found for: AST, ALT, BUN  BP Readings from Last 3 Encounters:   08/09/21 112/74   07/26/21 112/76   09/24/20 112/68          (goal 120/80)    Past Medical History:   Diagnosis Date    Abdominal pain     feels crampy/colicky    Abnormal CT of the abdomen     Acute upper respiratory infection     Coccyx contusion     Fractured coccyx ([address]     H/O acute pharyngitis     H/O acute sinusitis     H/O contact dermatitis     H/O nausea and vomiting     H/O pyelonephritis     Lump of skin       Past Surgical History:   Procedure Laterality Date    HYSTEROSCOPY      with endometrial ablation    KNEE ARTHROSCOPY      OTHER SURGICAL HISTORY Right 12/19/2016    right sided lumbar facet steroid injection    OTHER SURGICAL HISTORY  01/12/2017    lumbar medial nerve block L2-L5    PARTIAL HYSTERECTOMY      TUBAL LIGATION         Family History   Problem Relation Age of Onset    Heart Disease Mother     High Blood Pressure Mother     High Cholesterol Mother     Arthritis Father     Other Maternal Grandmother         Mantel cell lymphoma    Other Paternal Aunt         oral cancer       Social History     Tobacco Use    Smoking status: Current Every Day Smoker     Types: Cigarettes    Smokeless tobacco: Never Used   Substance Use Topics    Alcohol use: Yes     Alcohol/week: 0.0 standard drinks     Comment: occ. Current Outpatient Medications   Medication Sig Dispense Refill    nitrofurantoin, macrocrystal-monohydrate, (MACROBID) 100 MG capsule Take 1 capsule by mouth 2 times daily for 10 days 20 capsule 0    fluticasone-salmeterol (ADVAIR DISKUS) 250-50 MCG/DOSE AEPB Inhale 1 puff into the lungs every 12 hours 60 each 3    albuterol (PROVENTIL) (2.5 MG/3ML) 0.083% nebulizer solution Take 3 mLs by nebulization every 4 hours as needed for Wheezing or Shortness of Breath 120 vial 0    LORazepam (ATIVAN) 1 MG tablet Take 1 mg by mouth every 6 hours as needed for Anxiety.  albuterol sulfate HFA (VENTOLIN HFA) 108 (90 Base) MCG/ACT inhaler Inhale 2 puffs into the lungs 4 times daily as needed for Wheezing 1 Inhaler 2     No current facility-administered medications for this visit.      Allergies   Allergen Reactions    Tramadol Other (See Comments) and Hives     Feels intoxicated for a long time  Keeps her up  Feels intoxicated for a long time    Ciprofloxacin     Vicodin [Hydrocodone-Acetaminophen] Other (See Comments)     Feels like bugs crawling all over    Percocet [Oxycodone-Acetaminophen] Nausea And Vomiting       Health Maintenance   Topic Date Due    Hepatitis C screen  Never done    COVID-19 Vaccine (1) Never done    HIV screen  Never done    Cervical cancer screen  Never done    Lipid screen  Never done    Flu vaccine (1) 09/01/2021    DTaP/Tdap/Td vaccine (3 - Td or Tdap) 04/19/2027    Pneumococcal 0-64 years Vaccine (2 of 2 - PPSV23) 12/14/2044    Hepatitis A vaccine  Aged Out    Hepatitis B vaccine  Aged Out    Hib vaccine  Aged Out    Meningococcal (ACWY) vaccine  Aged Out       Subjective: if not sample. Miralax daily  Reviewed CT scan --referral to GI  REviewed Urine--changed ABX to Macrobid. Return in about 2 weeks (around 8/23/2021) for Asthma, UTI recheck. Orders Placed This Encounter   Procedures   Raven Ackerman MD, Gastroenterology, [facility]     Referral Priority:   Routine     Referral Type:   Eval and Treat     Referral Reason:   Specialty Services Required     Referred to Provider:   Maryjo Reagan MD     Requested Specialty:   Gastroenterology     Number of Visits Requested:   1    Full PFT Study With Bronchodilator     Standing Status:   Future     Standing Expiration Date:   12/7/2021     Scheduling Instructions: This is to be done at the hospital     Orders Placed This Encounter   Medications    nitrofurantoin, macrocrystal-monohydrate, (MACROBID) 100 MG capsule     Sig: Take 1 capsule by mouth 2 times daily for 10 days     Dispense:  20 capsule     Refill:  0    fluticasone-salmeterol (ADVAIR DISKUS) 250-50 MCG/DOSE AEPB     Sig: Inhale 1 puff into the lungs every 12 hours     Dispense:  60 each     Refill:  3       Patient given educational materials - see patient instructions. Discussed use, benefit, and side effects of prescribed medications. All patient questions answered. Pt voiced understanding. Reviewed health maintenance. Instructed to continue current medications, diet and exercise. Patient agreed with treatment plan. Follow up as directed.      Electronically signed by Anthony Christianson PA-C on 8/9/2021 at 2:57 PM

## 2021-08-19 ENCOUNTER — OFFICE VISIT (OUTPATIENT)
Dept: GASTROENTEROLOGY | Age: 41
End: 2021-08-19
Payer: COMMERCIAL

## 2021-08-19 ENCOUNTER — TELEPHONE (OUTPATIENT)
Dept: GASTROENTEROLOGY | Age: 41
End: 2021-08-19

## 2021-08-19 VITALS
WEIGHT: 117 LBS | BODY MASS INDEX: 21.53 KG/M2 | HEIGHT: 62 IN | DIASTOLIC BLOOD PRESSURE: 66 MMHG | SYSTOLIC BLOOD PRESSURE: 117 MMHG

## 2021-08-19 DIAGNOSIS — K59.01 SLOW TRANSIT CONSTIPATION: ICD-10-CM

## 2021-08-19 DIAGNOSIS — K21.9 GASTROESOPHAGEAL REFLUX DISEASE, UNSPECIFIED WHETHER ESOPHAGITIS PRESENT: ICD-10-CM

## 2021-08-19 DIAGNOSIS — F41.9 ANXIETY: ICD-10-CM

## 2021-08-19 DIAGNOSIS — R93.89 ABNORMAL FINDING ON IMAGING: Primary | ICD-10-CM

## 2021-08-19 PROCEDURE — 4004F PT TOBACCO SCREEN RCVD TLK: CPT | Performed by: INTERNAL MEDICINE

## 2021-08-19 PROCEDURE — G8427 DOCREV CUR MEDS BY ELIG CLIN: HCPCS | Performed by: INTERNAL MEDICINE

## 2021-08-19 PROCEDURE — 99204 OFFICE O/P NEW MOD 45 MIN: CPT | Performed by: INTERNAL MEDICINE

## 2021-08-19 PROCEDURE — G8420 CALC BMI NORM PARAMETERS: HCPCS | Performed by: INTERNAL MEDICINE

## 2021-08-19 ASSESSMENT — ENCOUNTER SYMPTOMS
GASTROINTESTINAL NEGATIVE: 1
BACK PAIN: 1
COUGH: 1
TROUBLE SWALLOWING: 0
WHEEZING: 1

## 2021-08-19 NOTE — PROGRESS NOTES
always. Denies bleeding. .    Past Medical,Family, and Social History reviewed and does contribute to the patient presenting condition. Patient's PMH/PSH,SH,PSYCH Hx, MEDs, ALLERGIES, and ROS were all reviewed and updated in the appropriate sections. PAST MEDICAL HISTORY:  Past Medical History:   Diagnosis Date    Abdominal pain     feels crampy/colicky    Abnormal CT of the abdomen     Acute upper respiratory infection     Coccyx contusion     Fractured coccyx ([address]     H/O acute pharyngitis     H/O acute sinusitis     H/O contact dermatitis     H/O nausea and vomiting     H/O pyelonephritis     Lump of skin        Past Surgical History:   Procedure Laterality Date    HYSTEROSCOPY      with endometrial ablation    KNEE ARTHROSCOPY      OTHER SURGICAL HISTORY Right 12/19/2016    right sided lumbar facet steroid injection    OTHER SURGICAL HISTORY  01/12/2017    lumbar medial nerve block L2-L5    PARTIAL HYSTERECTOMY      TUBAL LIGATION         CURRENT MEDICATIONS:    Current Outpatient Medications:     nitrofurantoin, macrocrystal-monohydrate, (MACROBID) 100 MG capsule, Take 1 capsule by mouth 2 times daily for 10 days, Disp: 20 capsule, Rfl: 0    fluticasone-salmeterol (ADVAIR DISKUS) 250-50 MCG/DOSE AEPB, Inhale 1 puff into the lungs every 12 hours, Disp: 60 each, Rfl: 3    albuterol (PROVENTIL) (2.5 MG/3ML) 0.083% nebulizer solution, Take 3 mLs by nebulization every 4 hours as needed for Wheezing or Shortness of Breath, Disp: 120 vial, Rfl: 0    LORazepam (ATIVAN) 1 MG tablet, Take 1 mg by mouth every 6 hours as needed for Anxiety. , Disp: , Rfl:     albuterol sulfate HFA (VENTOLIN HFA) 108 (90 Base) MCG/ACT inhaler, Inhale 2 puffs into the lungs 4 times daily as needed for Wheezing, Disp: 1 Inhaler, Rfl: 2    ALLERGIES:   Allergies   Allergen Reactions    Tramadol Other (See Comments) and Hives     Feels intoxicated for a long time  Keeps her up  Feels intoxicated for a long time  Ciprofloxacin     Vicodin [Hydrocodone-Acetaminophen] Other (See Comments)     Feels like bugs crawling all over    Percocet [Oxycodone-Acetaminophen] Nausea And Vomiting       FAMILY HISTORY:       Problem Relation Age of Onset    Heart Disease Mother     High Blood Pressure Mother     High Cholesterol Mother     Arthritis Father     Other Maternal Grandmother         Mantel cell lymphoma    Other Paternal Aunt         oral cancer         SOCIAL HISTORY:   Social History     Socioeconomic History    Marital status: Single     Spouse name: Not on file    Number of children: Not on file    Years of education: Not on file    Highest education level: Not on file   Occupational History    Not on file   Tobacco Use    Smoking status: Current Every Day Smoker     Types: Cigarettes    Smokeless tobacco: Never Used   Vaping Use    Vaping Use: Every day   Substance and Sexual Activity    Alcohol use: Not Currently     Alcohol/week: 0.0 standard drinks    Drug use: No    Sexual activity: Not on file   Other Topics Concern    Not on file   Social History Narrative    Not on file     Social Determinants of Health     Financial Resource Strain: Low Risk     Difficulty of Paying Living Expenses: Not hard at all   Food Insecurity: No Food Insecurity    Worried About Running Out of Food in the Last Year: Never true    Mira of Food in the Last Year: Never true   Transportation Needs:     Lack of Transportation (Medical):      Lack of Transportation (Non-Medical):    Physical Activity:     Days of Exercise per Week:     Minutes of Exercise per Session:    Stress:     Feeling of Stress :    Social Connections:     Frequency of Communication with Friends and Family:     Frequency of Social Gatherings with Friends and Family:     Attends Yarsanism Services:     Active Member of Clubs or Organizations:     Attends Club or Organization Meetings:     Marital Status:    Intimate Partner Violence:  Fear of Current or Ex-Partner:     Emotionally Abused:     Physically Abused:     Sexually Abused:          REVIEW OF SYSTEMS:         Review of Systems   Constitutional: Positive for appetite change and fatigue. Negative for unexpected weight change. HENT: Negative for trouble swallowing. Eyes: Positive for visual disturbance. Respiratory: Positive for cough and wheezing. Cardiovascular: Negative for chest pain. Gastrointestinal: Negative. Musculoskeletal: Positive for back pain. Neurological: Positive for headaches. Psychiatric/Behavioral: Negative for sleep disturbance. PHYSICAL EXAMINATION: Vital signs reviewed per the nursing documentation. /66   Ht 5' 2\" (1.575 m)   Wt 117 lb (53.1 kg)   BMI 21.40 kg/m²   Body mass index is 21.4 kg/m². Physical Exam  Nursing note reviewed. Constitutional:       Appearance: She is well-developed. Comments: Anxious    HENT:      Head: Normocephalic and atraumatic. Eyes:      Conjunctiva/sclera: Conjunctivae normal.      Pupils: Pupils are equal, round, and reactive to light. Cardiovascular:      Heart sounds: Normal heart sounds. Pulmonary:      Effort: Pulmonary effort is normal.      Breath sounds: Normal breath sounds. Abdominal:      General: Bowel sounds are normal.      Palpations: Abdomen is soft. Comments: NON TENDER, NON DISTENTED  LIVER SPLEEN AND HERNIAS ARE NOT  PALPABLE  BOWEL SOUNDS ARE POSITIVE      Musculoskeletal:         General: Normal range of motion. Cervical back: Normal range of motion and neck supple. Skin:     General: Skin is warm. Neurological:      Mental Status: She is alert and oriented to person, place, and time.    Psychiatric:         Behavior: Behavior normal.           LABORATORY DATA: Reviewed  No results found for: WBC, HGB, HCT, MCV, PLT, NA, K, CL, CO2, BUN, CREATININE, LABPROT, LABALBU, GGT, BILITOT, ALKPHOS, AST, ALT, INR      No results found for: RBC, HGB, MCV, MCH, MCHC, RDW, MPV, BASOPCT, LYMPHSABS, MONOSABS, NEUTROABS, EOSABS, BASOSABS      DIAGNOSTIC TESTING:     No results found. Assessment  1. Abnormal finding on imaging    2. Gastroesophageal reflux disease, unspecified whether esophagitis present    3. Slow transit constipation    4. Anxiety        Plan    Plan Coloscopy    The Endoscopic procedure was explained to the patient in detail  The prep and NPO were explained  All the Risks, Benefits, and Alternatives were explained  Risk of Bleeding, Perforation and Cardio Respiratory risks were explained  her questions were answered  The procedure has been scheduled with the  in the office  Patient was asked to give us a call for any questions  The patient has verbalized understanding and agreement to this plan. Pt was advised in detail about some life style and dietary modifications. She was advised about avoidance of caffeine, nicotine and chocolate. Pt was also told to stay away from any kind of fast foods, soda pops. She was also advised to avoid lots of spices, grease and fried food etc.     Instructions were also given about trying to arrange the timing, quality and quantity of food. Instructions were given about using ample amount of fiber including dietary and supplemental fiber either metamucil, bennafiber or citrucell etc.  Pt was advised about drinking ample amount of water without any colors or chemicals. Stress was given about regular exercise. Pt has verbalized understanding and agreement to these modifications.     The patient was instructed to start taking some OTC Probiotics products   These are available over the counter at the Pharmacy stores and Grocery stores  He was explained about the beneficial effects they have in the GI track  They will help to establish the good bacterial latesha and will help with the digestion and bowel movements  The patient has verbalized understanding and agreement to this plan        More than half of patient's clinic visit time was spent in counseling about lifestyle and dietary modifications  Patient's  questions were answered in this regard as well  The patient has verbalized understanding and agreement     Thank you for allowing me to participate in the care of Ms. Franky Baird. For any further questions please do not hesitate to contact me. I have reviewed and agree with the ROS entered by the MA/Nurse.          Alyse Barragan MD, [facility]  Board Certified in Gastroenterology and [address] [place] [address] Gastroenterology  Office #: [phone]

## 2021-08-20 ENCOUNTER — TELEPHONE (OUTPATIENT)
Dept: GASTROENTEROLOGY | Age: 41
End: 2021-08-20

## 2021-08-20 RX ORDER — BISACODYL 5 MG
TABLET, DELAYED RELEASE (ENTERIC COATED) ORAL
Qty: 4 TABLET | Refills: 0 | Status: SHIPPED | OUTPATIENT
Start: 2021-08-20 | End: 2021-09-09 | Stop reason: ALTCHOICE

## 2021-08-20 RX ORDER — POLYETHYLENE GLYCOL 3350 17 G/17G
POWDER, FOR SOLUTION ORAL
Qty: 238 G | Refills: 0 | Status: SHIPPED | OUTPATIENT
Start: 2021-08-20 | End: 2021-11-22

## 2021-08-20 NOTE — TELEPHONE ENCOUNTER
Patient LM noting that her note states she adopted and that it was marked as no blood/mucous in her stool. Patient notes that is not correct.

## 2021-08-23 ENCOUNTER — HOSPITAL ENCOUNTER (OUTPATIENT)
Dept: LAB | Age: 41
Setting detail: SPECIMEN
Discharge: HOME | End: 2021-08-23
Payer: COMMERCIAL

## 2021-08-23 DIAGNOSIS — Z01.818 PRE-OP TESTING: Primary | ICD-10-CM

## 2021-08-23 PROCEDURE — U0003 INFECTIOUS AGENT DETECTION BY NUCLEIC ACID (DNA OR RNA); SEVERE ACUTE RESPIRATORY SYNDROME CORONAVIRUS 2 (SARS-COV-2) (CORONAVIRUS DISEASE [COVID-19]), AMPLIFIED PROBE TECHNIQUE, MAKING USE OF HIGH THROUGHPUT TECHNOLOGIES AS DESCRIBED BY CMS-2020-01-R: HCPCS

## 2021-08-23 PROCEDURE — U0005 INFEC AGEN DETEC AMPLI PROBE: HCPCS

## 2021-08-24 ENCOUNTER — OFFICE VISIT (OUTPATIENT)
Dept: PRIMARY CARE CLINIC | Age: 41
End: 2021-08-24
Payer: COMMERCIAL

## 2021-08-24 ENCOUNTER — HOSPITAL ENCOUNTER (OUTPATIENT)
Age: 41
Setting detail: SPECIMEN
Discharge: HOME | End: 2021-08-24
Payer: COMMERCIAL

## 2021-08-24 ENCOUNTER — HOSPITAL ENCOUNTER (OUTPATIENT)
Dept: PREADMISSION TESTING | Age: 41
Setting detail: OUTPATIENT SURGERY
Discharge: HOME | End: 2021-08-28
Payer: COMMERCIAL

## 2021-08-24 VITALS
WEIGHT: 116 LBS | OXYGEN SATURATION: 98 % | BODY MASS INDEX: 21.35 KG/M2 | SYSTOLIC BLOOD PRESSURE: 118 MMHG | HEART RATE: 62 BPM | DIASTOLIC BLOOD PRESSURE: 72 MMHG | HEIGHT: 62 IN

## 2021-08-24 VITALS — WEIGHT: 116 LBS | BODY MASS INDEX: 22.78 KG/M2 | HEIGHT: 60 IN

## 2021-08-24 DIAGNOSIS — N30.00 ACUTE CYSTITIS WITHOUT HEMATURIA: ICD-10-CM

## 2021-08-24 DIAGNOSIS — N12 PYELONEPHRITIS: ICD-10-CM

## 2021-08-24 DIAGNOSIS — N12 PYELONEPHRITIS: Primary | ICD-10-CM

## 2021-08-24 DIAGNOSIS — R82.998 SWEET URINE ODOR: ICD-10-CM

## 2021-08-24 DIAGNOSIS — R06.2 WHEEZING: ICD-10-CM

## 2021-08-24 DIAGNOSIS — R06.02 SHORTNESS OF BREATH: ICD-10-CM

## 2021-08-24 LAB
BILIRUBIN, POC: NORMAL
BLOOD URINE, POC: NORMAL
CLARITY, POC: NORMAL
COLOR, POC: NORMAL
GLUCOSE URINE, POC: NORMAL
KETONES, POC: NORMAL
LEUKOCYTE EST, POC: NORMAL
NITRITE, POC: NORMAL
PH, POC: 7
PROTEIN, POC: NORMAL
SARS-COV-2: NORMAL
SARS-COV-2: NOT DETECTED
SOURCE: NORMAL
SPECIFIC GRAVITY, POC: 1.02
UROBILINOGEN, POC: 1

## 2021-08-24 PROCEDURE — 99213 OFFICE O/P EST LOW 20 MIN: CPT | Performed by: PHYSICIAN ASSISTANT

## 2021-08-24 PROCEDURE — G8420 CALC BMI NORM PARAMETERS: HCPCS | Performed by: PHYSICIAN ASSISTANT

## 2021-08-24 PROCEDURE — 81003 URINALYSIS AUTO W/O SCOPE: CPT | Performed by: PHYSICIAN ASSISTANT

## 2021-08-24 PROCEDURE — G8427 DOCREV CUR MEDS BY ELIG CLIN: HCPCS | Performed by: PHYSICIAN ASSISTANT

## 2021-08-24 PROCEDURE — 4004F PT TOBACCO SCREEN RCVD TLK: CPT | Performed by: PHYSICIAN ASSISTANT

## 2021-08-24 RX ORDER — NITROFURANTOIN 25; 75 MG/1; MG/1
100 CAPSULE ORAL 2 TIMES DAILY
Qty: 14 CAPSULE | Refills: 0 | Status: SHIPPED | OUTPATIENT
Start: 2021-08-24 | End: 2021-09-09 | Stop reason: ALTCHOICE

## 2021-08-24 ASSESSMENT — ENCOUNTER SYMPTOMS
SHORTNESS OF BREATH: 1
ABDOMINAL PAIN: 1
NAUSEA: 0
COUGH: 1
VOMITING: 0

## 2021-08-24 NOTE — PROGRESS NOTES
Patient's medical history, recent hospitalization in July 2021, recent pyelonephritis Dx, EKG resuls from 7/26/21 discussed with Dr. Jazz Patel. Medical clearance requested per Dr. Jazz Patel.

## 2021-08-24 NOTE — PROGRESS NOTES

## 2021-08-24 NOTE — PROGRESS NOTES
[address] [address]  Dept: [phone]    Teetee Roman is a 38 y.o. female who presents today for her medical conditions/complaints as noted below. Chief Complaint   Patient presents with    2 Week Follow-Up     pt said she did not  Advair due to throwing the RX away by accident. Pt said she finished the abx, pt said her stomach does not feel right this morning.  Other     pt scheduled to do PFT today and colonoscopy on friday       HPI:     HPI  SOB still occurring since she did not  Advair yet. Has PFT today    Having an EGD and colonoscopy  on 8/27/21    Finished Macrobid and no urinary symptoms, bur urine is \"strange looking\"   With a pregnancy 25 years ago she had NATALIA  and urology did work up but did not find anything. She also saw urology a few years ago and they were going to put her on a pill but she did not want to do that. She does not think she had a cystoscopy at that time.      No results found for: LDLCHOLESTEROL, LDLCALC    (goal LDL is <100)   No results found for: AST, ALT, BUN  BP Readings from Last 3 Encounters:   08/24/21 118/72   08/19/21 117/66   08/09/21 112/74          (goal 120/80)    Past Medical History:   Diagnosis Date    Abdominal pain     feels crampy/colicky    Abnormal CT of the abdomen     Acute upper respiratory infection     Coccyx contusion     Fractured coccyx ([address]     H/O acute pharyngitis     H/O acute sinusitis     H/O contact dermatitis     H/O nausea and vomiting     H/O pyelonephritis     Lump of skin       Past Surgical History:   Procedure Laterality Date    HYSTEROSCOPY      with endometrial ablation    KNEE ARTHROSCOPY      OTHER SURGICAL HISTORY Right 12/19/2016    right sided lumbar facet steroid injection    OTHER SURGICAL HISTORY  01/12/2017    lumbar medial nerve block L2-L5    PARTIAL HYSTERECTOMY      TUBAL LIGATION         Family History   Problem Relation Age of Onset    Heart Disease Mother     High Blood Pressure Mother     High Cholesterol Mother     Arthritis Father     Other Maternal Grandmother         Mantel cell lymphoma    Other Paternal Aunt         oral cancer       Social History     Tobacco Use    Smoking status: Current Every Day Smoker     Types: Cigarettes    Smokeless tobacco: Never Used   Substance Use Topics    Alcohol use: Not Currently     Alcohol/week: 0.0 standard drinks      Current Outpatient Medications   Medication Sig Dispense Refill    fluticasone-salmeterol (ADVAIR DISKUS) 250-50 MCG/DOSE AEPB Inhale 1 puff into the lungs every 12 hours 60 each 3    nitrofurantoin, macrocrystal-monohydrate, (MACROBID) 100 MG capsule Take 1 capsule by mouth 2 times daily for 20 days 14 capsule 0    polyethylene glycol (GLYCOLAX) 17 GM/SCOOP powder Follow Instructions provided by physician's office 238 g 0    bisacodyl (BISACODYL) 5 MG EC tablet Take 4 tablets in the morning 4 tablet 0    albuterol (PROVENTIL) (2.5 MG/3ML) 0.083% nebulizer solution Take 3 mLs by nebulization every 4 hours as needed for Wheezing or Shortness of Breath 120 vial 0    LORazepam (ATIVAN) 1 MG tablet Take 1 mg by mouth every 6 hours as needed for Anxiety.  albuterol sulfate HFA (VENTOLIN HFA) 108 (90 Base) MCG/ACT inhaler Inhale 2 puffs into the lungs 4 times daily as needed for Wheezing 1 Inhaler 2     No current facility-administered medications for this visit.      Allergies   Allergen Reactions    Tramadol Other (See Comments) and Hives     Feels intoxicated for a long time  Keeps her up  Feels intoxicated for a long time    Ciprofloxacin     Vicodin [Hydrocodone-Acetaminophen] Other (See Comments)     Feels like bugs crawling all over    Percocet [Oxycodone-Acetaminophen] Nausea And Vomiting       Health Maintenance   Topic Date Due    Hepatitis C screen  Never done    COVID-19 Vaccine (1) Never done    HIV screen  Never done    Cervical cancer screen  Never done    Lipid screen  Never done    Flu vaccine (1) 09/01/2021    DTaP/Tdap/Td vaccine (3 - Td or Tdap) 04/19/2027    Pneumococcal 0-64 years Vaccine (2 of 2 - PPSV23) 12/14/2044    Hepatitis A vaccine  Aged Out    Hepatitis B vaccine  Aged Out    Hib vaccine  Aged Out    Meningococcal (ACWY) vaccine  Aged Out       Subjective:      Review of Systems   Constitutional: Negative for chills, diaphoresis and fever. Respiratory: Positive for cough and shortness of breath. Cardiovascular: Negative. Gastrointestinal: Positive for abdominal pain. Negative for nausea and vomiting. Genitourinary: Negative. Urine looks strange       Objective:     /72   Pulse 62   Ht 5' 2\" (1.575 m)   Wt 116 lb (52.6 kg)   SpO2 98%   BMI 21.22 kg/m²   Physical Exam  Vitals and nursing note reviewed. Constitutional:       Appearance: Normal appearance. She is not ill-appearing. Cardiovascular:      Rate and Rhythm: Normal rate and regular rhythm. Heart sounds: Normal heart sounds. Pulmonary:      Effort: Pulmonary effort is normal.      Breath sounds: Normal breath sounds. Abdominal:      General: Abdomen is flat. Bowel sounds are normal.      Palpations: Abdomen is soft. Tenderness: There is generalized abdominal tenderness. There is no guarding or rebound. Neurological:      Mental Status: She is alert. Assessment:       Diagnosis Orders   1. Pyelonephritis  POCT Urinalysis No Micro (Auto)    Culture, Urine   2. Wheezing  fluticasone-salmeterol (ADVAIR DISKUS) 250-50 MCG/DOSE AEPB   3. Shortness of breath  fluticasone-salmeterol (ADVAIR DISKUS) 250-50 MCG/DOSE AEPB   4. Sweet urine odor  Basic Metabolic Panel, Fasting    Hemoglobin A1C   5.  Acute cystitis without hematuria  nitrofurantoin, macrocrystal-monohydrate, (MACROBID) 100 MG capsule        Plan:    Start Probiotic--sample, if we have  Recheck urine--sent for culture  [employer] started  See urology   Advair sent again  Having PFT today    Return in about 2 weeks (around 9/7/2021) for 1/2 hour. Orders Placed This Encounter   Procedures    Culture, Urine     Standing Status:   Future     Standing Expiration Date:   8/24/2022     Order Specific Question:   Specify (ex-cath, midstream, cysto, etc)? Answer:   midstream    Basic Metabolic Panel, Fasting     Standing Status:   Future     Standing Expiration Date:   8/24/2022    Hemoglobin A1C     Standing Status:   Future     Standing Expiration Date:   8/24/2022    POCT Urinalysis No Micro (Auto)     Orders Placed This Encounter   Medications    fluticasone-salmeterol (ADVAIR DISKUS) 250-50 MCG/DOSE AEPB     Sig: Inhale 1 puff into the lungs every 12 hours     Dispense:  60 each     Refill:  3    nitrofurantoin, macrocrystal-monohydrate, (MACROBID) 100 MG capsule     Sig: Take 1 capsule by mouth 2 times daily for 20 days     Dispense:  14 capsule     Refill:  0       Patient given educational materials - see patient instructions. Discussed use, benefit, and side effects of prescribed medications. All patient questions answered. Pt voiced understanding. Reviewed health maintenance. Instructed to continue current medications, diet and exercise. Patient agreed with treatment plan. Follow up as directed.      Electronically signed by Darron Duverney, PA-C on 8/24/2021 at 8:36 AM

## 2021-08-26 ENCOUNTER — TELEPHONE (OUTPATIENT)
Dept: PRIMARY CARE CLINIC | Age: 41
End: 2021-08-26

## 2021-08-26 ENCOUNTER — ANESTHESIA EVENT (OUTPATIENT)
Dept: ENDOSCOPY | Age: 41
End: 2021-08-26
Payer: COMMERCIAL

## 2021-08-26 LAB
CULTURE: ABNORMAL
Lab: ABNORMAL
SPECIMEN DESCRIPTION: ABNORMAL

## 2021-08-26 NOTE — TELEPHONE ENCOUNTER
Please make sure to mention that she has a current E coli infection in her bladder on the surgical clearance---They may find a fistula.

## 2021-08-26 NOTE — TELEPHONE ENCOUNTER
Pt procedure time changed from 1130am to 1245pm on 8/27/21 per Dr Rizwan Mathis.  Called and spoke to pt to notify her of schedule change and informed her to arrival at [address] [place] [address] at 1045am. Pt voiced understanding

## 2021-08-26 NOTE — TELEPHONE ENCOUNTER
Rec'd medical clearance request from [facility] this am. Faxed clearance to PCP Tiffany Peña at [phone]. Called office to inform them I needed to ASAP due to procedure being scheduled tomorrow. Lady who I spoke to stated pt was seen in the office 8/24/21 and she already sent message to provider. She stated she will fax it over when provider responds.

## 2021-08-27 ENCOUNTER — HOSPITAL ENCOUNTER (OUTPATIENT)
Age: 41
Setting detail: OUTPATIENT SURGERY
Discharge: HOME | End: 2021-08-27
Attending: INTERNAL MEDICINE | Admitting: INTERNAL MEDICINE
Payer: COMMERCIAL

## 2021-08-27 ENCOUNTER — ANESTHESIA (OUTPATIENT)
Dept: ENDOSCOPY | Age: 41
End: 2021-08-27
Payer: COMMERCIAL

## 2021-08-27 VITALS
HEIGHT: 60 IN | TEMPERATURE: 96.8 F | OXYGEN SATURATION: 100 % | BODY MASS INDEX: 22.78 KG/M2 | SYSTOLIC BLOOD PRESSURE: 119 MMHG | DIASTOLIC BLOOD PRESSURE: 75 MMHG | RESPIRATION RATE: 16 BRPM | WEIGHT: 116 LBS | HEART RATE: 56 BPM

## 2021-08-27 VITALS — SYSTOLIC BLOOD PRESSURE: 83 MMHG | OXYGEN SATURATION: 100 % | DIASTOLIC BLOOD PRESSURE: 52 MMHG

## 2021-08-27 PROCEDURE — 7100000000 HC PACU RECOVERY - FIRST 15 MIN: Performed by: INTERNAL MEDICINE

## 2021-08-27 PROCEDURE — 7100000031 HC ASPR PHASE II RECOVERY - ADDTL 15 MIN: Performed by: INTERNAL MEDICINE

## 2021-08-27 PROCEDURE — 7100000010 HC PHASE II RECOVERY - FIRST 15 MIN: Performed by: INTERNAL MEDICINE

## 2021-08-27 PROCEDURE — 7100000030 HC ASPR PHASE II RECOVERY - FIRST 15 MIN: Performed by: INTERNAL MEDICINE

## 2021-08-27 PROCEDURE — 2580000003 HC RX 258: Performed by: ANESTHESIOLOGY

## 2021-08-27 PROCEDURE — 2709999900 HC NON-CHARGEABLE SUPPLY: Performed by: INTERNAL MEDICINE

## 2021-08-27 PROCEDURE — 3700000000 HC ANESTHESIA ATTENDED CARE: Performed by: INTERNAL MEDICINE

## 2021-08-27 PROCEDURE — 7100000011 HC PHASE II RECOVERY - ADDTL 15 MIN: Performed by: INTERNAL MEDICINE

## 2021-08-27 PROCEDURE — 45378 DIAGNOSTIC COLONOSCOPY: CPT | Performed by: INTERNAL MEDICINE

## 2021-08-27 PROCEDURE — 3609027000 HC COLONOSCOPY: Performed by: INTERNAL MEDICINE

## 2021-08-27 PROCEDURE — 6360000002 HC RX W HCPCS: Performed by: NURSE ANESTHETIST, CERTIFIED REGISTERED

## 2021-08-27 PROCEDURE — 7100000001 HC PACU RECOVERY - ADDTL 15 MIN: Performed by: INTERNAL MEDICINE

## 2021-08-27 PROCEDURE — 2500000003 HC RX 250 WO HCPCS: Performed by: NURSE ANESTHETIST, CERTIFIED REGISTERED

## 2021-08-27 PROCEDURE — 3700000001 HC ADD 15 MINUTES (ANESTHESIA): Performed by: INTERNAL MEDICINE

## 2021-08-27 RX ORDER — SODIUM CHLORIDE 0.9 % (FLUSH) 0.9 %
10 SYRINGE (ML) INJECTION PRN
Status: DISCONTINUED | OUTPATIENT
Start: 2021-08-27 | End: 2021-08-27 | Stop reason: HOSPADM

## 2021-08-27 RX ORDER — SODIUM CHLORIDE 0.9 % (FLUSH) 0.9 %
10 SYRINGE (ML) INJECTION EVERY 12 HOURS SCHEDULED
Status: DISCONTINUED | OUTPATIENT
Start: 2021-08-27 | End: 2021-08-27 | Stop reason: HOSPADM

## 2021-08-27 RX ORDER — LIDOCAINE HYDROCHLORIDE 10 MG/ML
INJECTION, SOLUTION EPIDURAL; INFILTRATION; INTRACAUDAL; PERINEURAL PRN
Status: DISCONTINUED | OUTPATIENT
Start: 2021-08-27 | End: 2021-08-27 | Stop reason: SDUPTHER

## 2021-08-27 RX ORDER — LIDOCAINE HYDROCHLORIDE 10 MG/ML
1 INJECTION, SOLUTION EPIDURAL; INFILTRATION; INTRACAUDAL; PERINEURAL
Status: DISCONTINUED | OUTPATIENT
Start: 2021-08-27 | End: 2021-08-27 | Stop reason: HOSPADM

## 2021-08-27 RX ORDER — PROPOFOL 10 MG/ML
INJECTION, EMULSION INTRAVENOUS PRN
Status: DISCONTINUED | OUTPATIENT
Start: 2021-08-27 | End: 2021-08-27 | Stop reason: SDUPTHER

## 2021-08-27 RX ORDER — SODIUM CHLORIDE, SODIUM LACTATE, POTASSIUM CHLORIDE, CALCIUM CHLORIDE 600; 310; 30; 20 MG/100ML; MG/100ML; MG/100ML; MG/100ML
INJECTION, SOLUTION INTRAVENOUS CONTINUOUS
Status: DISCONTINUED | OUTPATIENT
Start: 2021-08-27 | End: 2021-08-27 | Stop reason: HOSPADM

## 2021-08-27 RX ORDER — SODIUM CHLORIDE 9 MG/ML
25 INJECTION, SOLUTION INTRAVENOUS PRN
Status: DISCONTINUED | OUTPATIENT
Start: 2021-08-27 | End: 2021-08-27 | Stop reason: HOSPADM

## 2021-08-27 RX ADMIN — SODIUM CHLORIDE, POTASSIUM CHLORIDE, SODIUM LACTATE AND CALCIUM CHLORIDE: 600; 310; 30; 20 INJECTION, SOLUTION INTRAVENOUS at 12:09

## 2021-08-27 RX ADMIN — LIDOCAINE HYDROCHLORIDE 50 MG: 10 INJECTION, SOLUTION EPIDURAL; INFILTRATION; INTRACAUDAL; PERINEURAL at 13:10

## 2021-08-27 RX ADMIN — PROPOFOL 380 MG: 10 INJECTION, EMULSION INTRAVENOUS at 13:10

## 2021-08-27 ASSESSMENT — PULMONARY FUNCTION TESTS
PIF_VALUE: 1
PIF_VALUE: 0
PIF_VALUE: 1

## 2021-08-27 ASSESSMENT — PAIN - FUNCTIONAL ASSESSMENT: PAIN_FUNCTIONAL_ASSESSMENT: 0-10

## 2021-08-27 ASSESSMENT — PAIN SCALES - GENERAL: PAINLEVEL_OUTOF10: 0

## 2021-08-27 ASSESSMENT — ENCOUNTER SYMPTOMS
SORE THROAT: 0
SHORTNESS OF BREATH: 0
SHORTNESS OF BREATH: 1
COUGH: 0
BACK PAIN: 1
WHEEZING: 0

## 2021-08-27 ASSESSMENT — LIFESTYLE VARIABLES: SMOKING_STATUS: 1

## 2021-08-27 NOTE — H&P
HISTORY and [address]       NAME:  Royce Redd  MRN: [id number]   YOB: 1980   Date: 8/27/2021   Age: 38 y.o. Gender: female       COMPLAINT AND PRESENT HISTORY:     Royce Redd is 38 y.o., female, having a diagnostic colonoscopy. Pt has not had previous colonoscopy. Pt had CT abdomen pelvis completed on 08/04/2021 revealing wall thickening and stranding adjacent to the descending colon and proximal transverse colon-results below. Pt was c/o RLQ abdominal pain radiating to right lower back. Pt did present to ED on 08/04/2021 with this concern. She was treated for pyelonephritis. She was discharged with PO antibiotics. She is still completing the course as prescribed (Tom Almaraz). She did not take her antibiotic yesterday or this am. Pt reports frequent UTIs. Denies dysuria, frequency, urgency, hematuria. RLQ abdominal pain has since resolved. She reports she has a \"bad back\" at baseline and is unable to determine if she is still feeling lower back or flank pain related to pyelonephritis. Pt reports she has BM twice per week. Denies she feels constipated. She reports her stool is soft when she is able to have BM. She has noticed blood in stool occasionally. Reports stool has a lot of mucus and is malodorous. Denies nausea, vomiting, diarrhea, constipation, melena. Denies decreased appetite or unintentional weight loss. She is unsure about Family Hx of colon cancer. Completed and followed prescribed prep. She did have vomiting when attempting to complete prep. Reports BM clear/watery after prep. NPO. No medications taken this am. Denies taking any blood thinning medications. She did not use her inhalers this am. Denies current chest pain/pressure, palpitations, SOB, recent URI, fever or chills. CT abdomen pelvis completed on 08/04/2021 at [facility]. IMPRESSION:     1.  Enhancement of the right ureter and striated appearance of the right   kidney are compatible with urinary tract infection including   pyelonephritis, in the appropriate clinical setting. 2. Wall thickening and stranding adjacent to the descending colon and   proximal transverse colon. Questionable stranding adjacent to the distal   descending colon as well. This raises concern for colitis and given the   concern for 2 different regions of inflammation, the possibility of   inflammatory bowel disease should be considered. Direct visualization is   recommended.      Date: 08/04/21   Received From: [address] [place] [address]          PAST MEDICAL HISTORY     Past Medical History:   Diagnosis Date    Abdominal pain     feels crampy/colicky    Abnormal CT of the abdomen     Acute upper respiratory infection     Arthritis     OA in lower back     Coccyx contusion     Fractured coccyx ([address]     H/O acute pharyngitis     H/O acute sinusitis     H/O contact dermatitis     H/O nausea and vomiting     H/O pyelonephritis     Laryngitis 08/2021    hopitalized in [facility] for 4-5 days for trouble breathing, intubated for 4 hours    Lump of skin     Sciatica     right side    SOB (shortness of breath) on exertion 08/24/2021    UTI (urinary tract infection)     frequent       SURGICAL HISTORY       Past Surgical History:   Procedure Laterality Date    HYSTEROSCOPY      with endometrial ablation    OTHER SURGICAL HISTORY Right 12/19/2016    right sided lumbar facet steroid injection    OTHER SURGICAL HISTORY  01/12/2017    lumbar medial nerve block L2-L5    OTHER SURGICAL HISTORY  05/2000    D & C    PARTIAL HYSTERECTOMY      TUBAL LIGATION         FAMILY HISTORY       Family History   Problem Relation Age of Onset    Heart Disease Mother     High Blood Pressure Mother     High Cholesterol Mother     Arthritis Father     Other Maternal Grandmother         Mantel cell lymphoma    Other Paternal Aunt         oral cancer       SOCIAL HISTORY       Social History Tape Rash     OK to use paper tape per patient    Percocet [Oxycodone-Acetaminophen] Nausea And Vomiting       No current facility-administered medications on file prior to encounter. Current Outpatient Medications on File Prior to Encounter   Medication Sig Dispense Refill    albuterol (PROVENTIL) (2.5 MG/3ML) 0.083% nebulizer solution Take 3 mLs by nebulization every 4 hours as needed for Wheezing or Shortness of Breath 120 vial 0    LORazepam (ATIVAN) 1 MG tablet Take 1 mg by mouth every 6 hours as needed for Anxiety.  albuterol sulfate HFA (VENTOLIN HFA) 108 (90 Base) MCG/ACT inhaler Inhale 2 puffs into the lungs 4 times daily as needed for Wheezing 1 Inhaler 2     Notation: Above medications are not currently reconciled at time of signing this H&P note, to be reconciled in pre-op per RN. Review of Systems   Constitutional: Negative for appetite change, chills, fever and unexpected weight change. HENT: Negative for congestion and sore throat. Eyes: Negative for visual disturbance. Respiratory: Negative for cough, shortness of breath and wheezing. Cardiovascular: Negative for chest pain, palpitations and leg swelling. Gastrointestinal:        See HPI   Genitourinary: Negative for dysuria, frequency, hematuria and urgency. Musculoskeletal: Positive for back pain (chronic). Skin: Negative. Neurological: Negative for dizziness, light-headedness and headaches. Hematological: Negative. Psychiatric/Behavioral: Negative. GENERAL PHYSICAL EXAM     Vitals: Review vitals per RN flowsheet. Physical Exam  Constitutional:       General: She is not in acute distress. Appearance: She is not ill-appearing, toxic-appearing or diaphoretic. HENT:      Head: Normocephalic and atraumatic. Nose: Nose normal. No congestion. Mouth/Throat:      Mouth: Mucous membranes are moist.      Pharynx: Oropharynx is clear.  No oropharyngeal exudate or posterior oropharyngeal erythema. Eyes:      General: No scleral icterus. Conjunctiva/sclera: Conjunctivae normal.   Cardiovascular:      Rate and Rhythm: Normal rate and regular rhythm. Heart sounds: Normal heart sounds. No murmur heard. No friction rub. No gallop. Pulmonary:      Effort: Pulmonary effort is normal. No respiratory distress. Breath sounds: Normal breath sounds. No wheezing, rhonchi or rales. Abdominal:      General: Bowel sounds are normal. There is no distension. Palpations: Abdomen is soft. Tenderness: There is no abdominal tenderness. There is no guarding. Musculoskeletal:         General: No swelling or tenderness. Cervical back: Neck supple. No tenderness. Right lower leg: No edema. Left lower leg: No edema. Skin:     General: Skin is warm and dry. Coloration: Skin is not jaundiced. Neurological:      General: No focal deficit present. Mental Status: She is alert and oriented to person, place, and time.       Gait: Gait normal.   Psychiatric:         Mood and Affect: Mood normal.        PROVISIONAL DIAGNOSES / SURGERY:      ABNORMAL IMAGING    COLONOSCOPY DIAGNOSTIC    Patient Active Problem List    Diagnosis Date Noted    Gastroesophageal reflux disease 08/19/2021    Abnormal finding on imaging 08/19/2021    Moderate asthma 08/09/2021    Chronic pain syndrome 11/20/2020    Anxiety 08/25/2020    Verruca 07/13/2020    Seasonal allergic rhinitis 03/11/2020    Cervicalgia 11/27/2019    Breast pain 10/18/2019    Refused influenza vaccine 01/05/2019    Refused Streptococcus pneumoniae vaccination 01/05/2019    Medication management 01/17/2018    Cystitis, chronic 08/02/2017    Hydronephrosis 08/02/2017    Spondylosis of lumbar region without myelopathy or radiculopathy 12/19/2016    Lumbar disc disease 11/28/2016    Bulging lumbar disc 11/28/2016    Spinal stenosis at L4-L5 level 11/28/2016    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

## 2021-08-27 NOTE — ANESTHESIA POSTPROCEDURE EVALUATION
POST- ANESTHESIA EVALUATION       Pt Name: Nikki Bates  MRN: [id number]  YOB: 1980  Date of evaluation: 8/27/2021  Time:  2:30 PM      BP (!) 100/56   Pulse 56   Temp 96.8 °F (36 °C) (Infrared)   Resp 16   Ht 5' (1.524 m)   Wt 116 lb (52.6 kg)   SpO2 100%   BMI 22.65 kg/m²      Consciousness Level  Awake  Cardiopulmonary Status  Stable  Pain Adequately Treated YES  Nausea / Vomiting  NO  Adequate Hydration  YES  Anesthesia Related Complications NONE      Electronically signed by Lilian Dey MD on 8/27/2021 at 2:30 PM       Department of Anesthesiology  Postprocedure Note    Patient: Nikki Bates  MRN: [id number]  YOB: 1980  Date of evaluation: 8/27/2021  Time:  2:29 PM     Procedure Summary     Date: 08/27/21 Room / Location: [address] [place] [address] 02 / 250 [facility] ENDO    Anesthesia Start: 4025 Anesthesia Stop: 4537    Procedure: COLONOSCOPY DIAGNOSTIC (N/A ) Diagnosis: (ABNORMAL IMAGING)    Surgeons: Randal Forbes MD Responsible Provider: Lilian Dey MD    Anesthesia Type: general ASA Status: 2          Anesthesia Type: general    Opal Phase I: Opal Score: 9    Opal Phase II:      Last vitals: Reviewed and per EMR flowsheets.        Anesthesia Post Evaluation

## 2021-08-27 NOTE — ANESTHESIA PRE PROCEDURE
Department of Anesthesiology  Preprocedure Note       Name:  Bob Rodriguez   Age:  38 y.o.  :  1980                                          MRN:  [id number]         Date:  2021      Surgeon: Mandy Delgado):  Adolfo Diaz MD    Procedure: Procedure(s):  COLONOSCOPY DIAGNOSTIC    Medications prior to admission:   Prior to Admission medications    Medication Sig Start Date End Date Taking? Authorizing Provider   fluticasone-salmeterol (ADVAIR DISKUS) 250-50 MCG/DOSE AEPB Inhale 1 puff into the lungs every 12 hours 21   Nestor Moreira PA-C   nitrofurantoin, macrocrystal-monohydrate, (MACROBID) 100 MG capsule Take 1 capsule by mouth 2 times daily for 20 days 21  Nestor Moreira PA-C   polyethylene glycol [facility]) 17 GM/SCOOP powder Follow Instructions provided by physician's office 21   Adolfo Diaz MD   bisacodyl (BISACODYL) 5 MG EC tablet Take 4 tablets in the morning 21   Adolfo Diaz MD   albuterol (PROVENTIL) (2.5 MG/3ML) 0.083% nebulizer solution Take 3 mLs by nebulization every 4 hours as needed for Wheezing or Shortness of Breath 21   Nestor Moreira PA-C   LORazepam (ATIVAN) 1 MG tablet Take 1 mg by mouth every 6 hours as needed for Anxiety.     Historical Provider, MD   albuterol sulfate HFA (VENTOLIN HFA) 108 (90 Base) MCG/ACT inhaler Inhale 2 puffs into the lungs 4 times daily as needed for Wheezing 20   Nestor Moreira PA-C       Current medications:    Current Facility-Administered Medications   Medication Dose Route Frequency Provider Last Rate Last Admin    lactated ringers infusion   IntraVENous Continuous Danielle Deluna MD        sodium chloride flush 0.9 % injection 10 mL  10 mL IntraVENous 2 times per day Danielle Deluna MD        sodium chloride flush 0.9 % injection 10 mL  10 mL IntraVENous PRN Danielle Deluna MD        0.9 % sodium chloride infusion  25 mL IntraVENous PRN Danielle Deluna MD        lidocaine PF 1 % injection 1 mL  1 mL Intradermal Once PRN Radha Beverly MD           Allergies:     Allergies   Allergen Reactions    Tramadol Other (See Comments) and Hives     Feels intoxicated for a long time  Keeps her up  Feels intoxicated for a long time    Ciprofloxacin     Vicodin [Hydrocodone-Acetaminophen] Other (See Comments)     Feels like bugs crawling all over    Adhesive Tape Rash     OK to use paper tape per patient    Percocet [Oxycodone-Acetaminophen] Nausea And Vomiting       Problem List:    Patient Active Problem List   Diagnosis Code    Endometriosis N80.9    Hip pain, bilateral M25.551, M25.552    Migraine headache G43.909    Near syncope R55    Ovarian cyst N83.209    Primary hypersomnia F51.11    Spontaneous ecchymosis R23.3    Constipation K59.00    Gastroesophageal reflux disease with esophagitis K21.00    Sciatica of right side M54.31    Thrombocytopenia due to drugs D69.59, T50.905A    Lumbar disc disease M51.9    Bulging lumbar disc M51.26    Spinal stenosis at L4-L5 level M48.061    Spondylosis of lumbar region without myelopathy or radiculopathy M47.816    Cystitis, chronic N30.20    Hydronephrosis N13.30    Medication management Z79.899    Refused influenza vaccine Z28.21    Refused Streptococcus pneumoniae vaccination Z28.21    Breast pain N64.4    Cervicalgia M54.2    Seasonal allergic rhinitis J30.2    Verruca B07.9    Anxiety F41.9    Chronic pain syndrome G89.4    Moderate asthma J45.909    Gastroesophageal reflux disease K21.9    Abnormal finding on imaging R93.89       Past Medical History:        Diagnosis Date    Abdominal pain     feels crampy/colicky    Abnormal CT of the abdomen     Acute upper respiratory infection     Arthritis     OA in lower back     Coccyx contusion     Fractured coccyx ([address]     H/O acute pharyngitis     H/O acute sinusitis     H/O contact dermatitis     H/O nausea and vomiting     H/O pyelonephritis     L[facility]itis 08/[facility]21 [facility] for 4-5 days for trouble breathing, intubated for 4 hours    Lump of skin     Sciatica     right side    SOB (shortness of breath) on exertion 08/24/2021    UTI (urinary tract infection)     frequent       Past Surgical History:        Procedure Laterality Date    HYSTEROSCOPY      with endometrial ablation    OTHER SURGICAL HISTORY Right 12/19/2016    right sided lumbar facet steroid injection    OTHER SURGICAL HISTORY  01/12/2017    lumbar medial nerve block L2-L5    OTHER SURGICAL HISTORY  05/2000    D & C    PARTIAL HYSTERECTOMY      TUBAL LIGATION         Social History:    Social History     Tobacco Use    Smoking status: Current Every Day Smoker     Packs/day: 0.50     Years: 32.00     Pack years: 16.00     Types: Cigarettes    Smokeless tobacco: Never Used   Substance Use Topics    Alcohol use: Not Currently     Alcohol/week: 0.0 standard drinks                                Ready to quit: Not Answered  Counseling given: Not Answered      Vital Signs (Current): There were no vitals filed for this visit. BP Readings from Last 3 Encounters:   08/24/21 118/72   08/19/21 117/66   08/09/21 112/74       NPO Status:                                                                                 BMI:   Wt Readings from Last 3 Encounters:   08/24/21 116 lb (52.6 kg)   08/24/21 116 lb (52.6 kg)   08/19/21 117 lb (53.1 kg)     There is no height or weight on file to calculate BMI.    CBC: No results found for: WBC, RBC, HGB, HCT, MCV, RDW, PLT    CMP: No results found for: NA, K, CL, CO2, BUN, CREATININE, GFRAA, AGRATIO, LABGLOM, GLUCOSE, PROT, CALCIUM, BILITOT, ALKPHOS, AST, ALT    POC Tests: No results for input(s): POCGLU, POCNA, POCK, POCCL, POCBUN, POCHEMO, POCHCT in the last 72 hours.     Coags: No results found for: PROTIME, INR, APTT    HCG (If Applicable): No results found for: PREGTESTUR, PREGSERUM, HCG, HCGQUANT ABGs: No results found for: PHART, PO2ART, ABF2QMH, XJN4TWU, BEART, P3RDQKNW     Type & Screen (If Applicable):  No results found for: LABABO, LABRH    Drug/Infectious Status (If Applicable):  No results found for: HIV, HEPCAB    COVID-19 Screening (If Applicable):   Lab Results   Component Value Date    COVID19 Not Detected 08/23/2021    COVID19 Not Detected 11/12/2020           Anesthesia Evaluation  Patient summary reviewed and Nursing notes reviewed  Airway: Mallampati: II  TM distance: >3 FB   Neck ROM: full  Mouth opening: > = 3 FB Dental:    (+) upper dentures and partials      Pulmonary: breath sounds clear to auscultation  (+) shortness of breath:  asthma: current smoker                           Cardiovascular:    (+) ABDUL:,         Rhythm: regular  Rate: normal                    Neuro/Psych:   (+) neuromuscular disease:, headaches: migraine headaches,             GI/Hepatic/Renal:   (+) GERD:,           Endo/Other:                     Abdominal:             Vascular: Other Findings:           Anesthesia Plan      general     ASA 2       Induction: intravenous. Anesthetic plan and risks discussed with patient. Plan discussed with CRNA.                   Alice Donnelly MD   8/27/2021

## 2021-08-27 NOTE — TELEPHONE ENCOUNTER
I called the patient- she did not mention this to Dr. Rizwan Mathis. I called Dr. Kimberley Awan office and advised them that she does have a E-coli infection. They stated that they will relay the message to him and the patients still needs to go for her procedure. Pt notified.

## 2021-08-27 NOTE — OP NOTE
PROCEDURE NOTE    DATE OF PROCEDURE: 8/27/2021    SURGEON: Genaro Booth MD    ASSISTANT: None    PREOPERATIVE DIAGNOSIS: ABNORMAL IMAGING  SCREENING    POSTOPERATIVE DIAGNOSIS: as described below    OPERATION: Total colonoscopy     ANESTHESIA: MAC PER ANESTHESIA     ESTIMATED BLOOD LOSS: less than 50     COMPLICATIONS: None. SPECIMENS:  Was NOT Obtained:     HISTORY: The patient is a 38y.o. year old female with history of above preop diagnosis. I recommended colonoscopy with possible biopsy or polypectomy and I explained the risk, benefits, expected outcome, and alternatives to the procedure. Risks included but are not limited to bleeding, infection, respiratory distress, hypotension, and perforation of the colon and possibility of missing a lesion. The patient understands and is in agreement. PROCEDURE: The patient was given IV conscious sedation. The patient's SPO2 remained above 90% throughout the procedure. The colonoscope was inserted per rectum and advanced under direct vision to the cecum without difficulty. The prep was good. FLOPPY REDUNDANT COLON   SMALL AMOUNT OF LIQUID STOOLS    Findings:  Terminal ileum: normal    Cecum/Ascending colon: normal    Transverse colon: normal    Descending/Sigmoid colon: normal    Rectum/Anus: examined in normal and retroflexed positions and was abnormal: SMALL INT HEMORRHOIDS    Withdrawal Time was (minutes): 12    The colon was decompressed and the scope was removed. The patient tolerated the procedure well. Recommendations/Plan:   1. Lifestyle and dietary modifications as discussed  2. F/U In Office in 3-4 weeks  3. Discussed with the family  4. Colonoscopy Recall :5 year  5. POST SEDATION PATIENT WAS STABLE WITH STABLE VITAL SIGNS AND OXYGEN SATURATIONS AND WAS DISCHARGED HOME WITH RIDE IN A STABLE CONDITION.     Electronically signed by Genaro Booth MD  on 8/27/2021 at 1:27 PM

## 2021-08-27 NOTE — TELEPHONE ENCOUNTER
Neda from [facility] called and adv pt has procedure this morning w/Dr. Colette Low and they were notified the pt has an E Coli infection in the bladder and may have a fistula. Spoke with MA and was adv to have pt still go for the procedure and she would notify Dr. Colette Low. Relayed information to [facility] [place].

## 2021-09-03 DIAGNOSIS — R06.02 SHORTNESS OF BREATH: ICD-10-CM

## 2021-09-03 DIAGNOSIS — R06.2 WHEEZING: ICD-10-CM

## 2021-09-09 ENCOUNTER — HOSPITAL ENCOUNTER (OUTPATIENT)
Age: 41
Setting detail: SPECIMEN
Discharge: HOME | End: 2021-09-09
Payer: COMMERCIAL

## 2021-09-09 ENCOUNTER — OFFICE VISIT (OUTPATIENT)
Dept: PRIMARY CARE CLINIC | Age: 41
End: 2021-09-09
Payer: COMMERCIAL

## 2021-09-09 VITALS
WEIGHT: 118 LBS | BODY MASS INDEX: 23.05 KG/M2 | OXYGEN SATURATION: 100 % | DIASTOLIC BLOOD PRESSURE: 64 MMHG | SYSTOLIC BLOOD PRESSURE: 101 MMHG | HEART RATE: 61 BPM

## 2021-09-09 DIAGNOSIS — F17.210 CIGARETTE NICOTINE DEPENDENCE WITHOUT COMPLICATION: ICD-10-CM

## 2021-09-09 DIAGNOSIS — J45.40 MODERATE PERSISTENT ASTHMA WITHOUT COMPLICATION: ICD-10-CM

## 2021-09-09 DIAGNOSIS — N30.00 ACUTE CYSTITIS WITHOUT HEMATURIA: Primary | ICD-10-CM

## 2021-09-09 PROCEDURE — 99213 OFFICE O/P EST LOW 20 MIN: CPT | Performed by: PHYSICIAN ASSISTANT

## 2021-09-09 PROCEDURE — 4004F PT TOBACCO SCREEN RCVD TLK: CPT | Performed by: PHYSICIAN ASSISTANT

## 2021-09-09 PROCEDURE — G8427 DOCREV CUR MEDS BY ELIG CLIN: HCPCS | Performed by: PHYSICIAN ASSISTANT

## 2021-09-09 PROCEDURE — G8420 CALC BMI NORM PARAMETERS: HCPCS | Performed by: PHYSICIAN ASSISTANT

## 2021-09-09 RX ORDER — BUPROPION HYDROCHLORIDE 150 MG/1
150 TABLET ORAL EVERY MORNING
Qty: 30 TABLET | Refills: 2 | Status: SHIPPED | OUTPATIENT
Start: 2021-09-09 | End: 2021-11-22 | Stop reason: SDUPTHER

## 2021-09-09 ASSESSMENT — ENCOUNTER SYMPTOMS
BLOOD IN STOOL: 0
ABDOMINAL PAIN: 1
NAUSEA: 0
ANAL BLEEDING: 0
DIARRHEA: 0
ABDOMINAL DISTENTION: 0
VOMITING: 0

## 2021-09-09 NOTE — PROGRESS NOTES
[facility] PRIMARY CARE  [address] [place] [id number]  Dept: [phone]    Jeffry Klein is a 38 y.o. female who presents today for her medical conditions/complaints as noted below. Chief Complaint   Patient presents with    2 Week Follow-Up     uti follow up, abx taken for two weeks, no sx    Results     discuss pft       HPI:     HPI  Urology appt:     PFT: has 2 interpretations.    Has not tried Advair    No results found for: LDLCHOLESTEROL, LDLCALC    (goal LDL is <100)   No results found for: AST, ALT, BUN  BP Readings from Last 3 Encounters:   09/09/21 101/64   08/27/21 (!) 83/52   08/27/21 119/75          (goal 120/80)    Past Medical History:   Diagnosis Date    Abdominal pain     feels crampy/colicky    Abnormal CT of the abdomen     Acute upper respiratory infection     Arthritis     OA in lower back     Coccyx contusion     Fractured coccyx ([address]     H/O acute pharyngitis     H/O acute sinusitis     H/O contact dermatitis     H/O nausea and vomiting     H/O pyelonephritis     Laryngitis 08/2021    hopitalized in [facility] for 4-5 days for trouble breathing, intubated for 4 hours    Lump of skin     Sciatica     right side    SOB (shortness of breath) on exertion 08/24/2021    UTI (urinary tract infection)     frequent      Past Surgical History:   Procedure Laterality Date    COLONOSCOPY N/A 8/27/2021    COLONOSCOPY DIAGNOSTIC performed by Sabra Castillo MD at [address] [place] [address]      with endometrial ablation    OTHER SURGICAL HISTORY Right 12/19/2016    right sided lumbar facet steroid injection    OTHER SURGICAL HISTORY  01/12/2017    lumbar medial nerve block L2-L5    OTHER SURGICAL HISTORY  05/2000    D & C    PARTIAL HYSTERECTOMY      TUBAL LIGATION         Family History   Problem Relation Age of Onset    Heart Disease Mother     High Blood Pressure Mother     High Cholesterol Mother    [facility] Arthritis Father     Other Maternal Grandmother         Mantel cell lymphoma    Other Paternal Aunt         oral cancer       Social History     Tobacco Use    Smoking status: Current Every Day Smoker     Packs/day: 0.50     Years: 32.00     Pack years: 16.00     Types: Cigarettes    Smokeless tobacco: Never Used   Substance Use Topics    Alcohol use: Not Currently     Alcohol/week: 0.0 standard drinks      Current Outpatient Medications   Medication Sig Dispense Refill    buPROPion (WELLBUTRIN XL) 150 MG extended release tablet Take 1 tablet by mouth every morning 30 tablet 2    fluticasone-salmeterol (ADVAIR DISKUS) 250-50 MCG/DOSE AEPB Inhale 1 puff into the lungs every 12 hours 60 each 3    albuterol (PROVENTIL) (2.5 MG/3ML) 0.083% nebulizer solution Take 3 mLs by nebulization every 4 hours as needed for Wheezing or Shortness of Breath 120 vial 0    albuterol sulfate HFA (VENTOLIN HFA) 108 (90 Base) MCG/ACT inhaler Inhale 2 puffs into the lungs 4 times daily as needed for Wheezing 1 Inhaler 2    polyethylene glycol (GLYCOLAX) 17 GM/SCOOP powder Follow Instructions provided by physician's office (Patient not taking: Reported on 9/9/2021) 238 g 0     No current facility-administered medications for this visit.      Allergies   Allergen Reactions    Tramadol Other (See Comments) and Hives     Feels intoxicated for a long time  Keeps her up  Feels intoxicated for a long time    Ciprofloxacin     Vicodin [Hydrocodone-Acetaminophen] Other (See Comments)     Feels like bugs crawling all over    Adhesive Tape Rash     OK to use paper tape per patient    Percocet [Oxycodone-Acetaminophen] Nausea And Vomiting       Health Maintenance   Topic Date Due    Hepatitis C screen  Never done    COVID-19 Vaccine (1) Never done    HIV screen  Never done    Lipid screen  Never done    Flu vaccine (1) 09/09/2022 (Originally 9/1/2021)    DTaP/Tdap/Td vaccine (3 - Td or Tdap) 04/19/2027    Pneumococcal 0-64 years Vaccine (2 of 2 - PPSV23) 12/14/2044    Hepatitis A vaccine  Aged Out    Hepatitis B vaccine  Aged Out    Hib vaccine  Aged Out    Meningococcal (ACWY) vaccine  Aged Out       Subjective:      Review of Systems   Gastrointestinal: Positive for abdominal pain. Negative for abdominal distention, anal bleeding, blood in stool, diarrhea, nausea and vomiting. Genitourinary: Negative. Objective:     /64   Pulse 61   Wt 118 lb (53.5 kg)   SpO2 100%   BMI 23.05 kg/m²   Physical Exam  Vitals and nursing note reviewed. Constitutional:       Appearance: Normal appearance. She is not ill-appearing. Cardiovascular:      Rate and Rhythm: Normal rate and regular rhythm. Heart sounds: Normal heart sounds. Pulmonary:      Effort: Pulmonary effort is normal.      Breath sounds: Normal breath sounds. No wheezing, rhonchi or rales. Abdominal:      General: Abdomen is flat. Bowel sounds are normal. There is no distension. Palpations: There is no mass. Tenderness: There is abdominal tenderness. There is guarding. There is no rebound. Neurological:      Mental Status: She is alert and oriented to person, place, and time. Psychiatric:         Mood and Affect: Mood normal.         Assessment:       Diagnosis Orders   1. Acute cystitis without hematuria  Urinalysis Reflex to Culture   2. Moderate persistent asthma without complication     3. Cigarette nicotine dependence without complication          Plan:    REcheck Urine today  Send PFT for clarification--there are 2 different impressions  Trial Advair  Discussed tobacco cessation  Will trial Wellbutrin to help anixety with cessation. Return in about 2 months (around 11/9/2021) for REcheck Asthma. Orders Placed This Encounter   Procedures    Urinalysis Reflex to Culture     Standing Status:   Future     Standing Expiration Date:   9/9/2022     Order Specific Question:   SPECIFY(EX-CATH,MIDSTREAM,CYSTO,ETC)?      Answer:   midstream Orders Placed This Encounter   Medications    buPROPion (WELLBUTRIN XL) 150 MG extended release tablet     Sig: Take 1 tablet by mouth every morning     Dispense:  30 tablet     Refill:  2       Patient given educational materials - see patient instructions. Discussed use, benefit, and side effects of prescribed medications. All patient questions answered. Pt voiced understanding. Reviewed health maintenance. Instructed to continue current medications, diet and exercise. Patient agreed with treatment plan. Follow up as directed.      Electronically signed by Rika Garcia PA-C on 9/9/2021 at 1:54 PM

## 2021-09-10 DIAGNOSIS — N30.00 ACUTE CYSTITIS WITHOUT HEMATURIA: ICD-10-CM

## 2021-09-10 LAB
-: ABNORMAL
AMORPHOUS: ABNORMAL
BACTERIA: ABNORMAL
BILIRUBIN URINE: NEGATIVE
CASTS UA: ABNORMAL /LPF (ref 0–8)
COLOR: YELLOW
COMMENT UA: ABNORMAL
CRYSTALS, UA: ABNORMAL /HPF
EPITHELIAL CELLS UA: ABNORMAL /HPF (ref 0–5)
GLUCOSE URINE: NEGATIVE
KETONES, URINE: NEGATIVE
LEUKOCYTE ESTERASE, URINE: ABNORMAL
MUCUS: ABNORMAL
NITRITE, URINE: NEGATIVE
OTHER OBSERVATIONS UA: ABNORMAL
PH UA: 8.5 (ref 5–8)
PROTEIN UA: NEGATIVE
RBC UA: ABNORMAL /HPF (ref 0–4)
RENAL EPITHELIAL, UA: ABNORMAL /HPF
SPECIFIC GRAVITY UA: 1.01 (ref 1–1.03)
TRICHOMONAS: ABNORMAL
TURBIDITY: CLEAR
URINE HGB: NEGATIVE
UROBILINOGEN, URINE: NORMAL
WBC UA: ABNORMAL /HPF (ref 0–5)
YEAST: ABNORMAL

## 2021-11-04 ENCOUNTER — OFFICE VISIT (OUTPATIENT)
Dept: GASTROENTEROLOGY | Age: 41
End: 2021-11-04
Payer: COMMERCIAL

## 2021-11-04 VITALS
OXYGEN SATURATION: 100 % | SYSTOLIC BLOOD PRESSURE: 95 MMHG | WEIGHT: 118.8 LBS | DIASTOLIC BLOOD PRESSURE: 66 MMHG | BODY MASS INDEX: 23.32 KG/M2 | TEMPERATURE: 98.3 F | HEIGHT: 60 IN | HEART RATE: 65 BPM

## 2021-11-04 DIAGNOSIS — Q43.8 REDUNDANT COLON: ICD-10-CM

## 2021-11-04 DIAGNOSIS — K59.01 SLOW TRANSIT CONSTIPATION: Primary | ICD-10-CM

## 2021-11-04 PROCEDURE — G8420 CALC BMI NORM PARAMETERS: HCPCS | Performed by: INTERNAL MEDICINE

## 2021-11-04 PROCEDURE — 4004F PT TOBACCO SCREEN RCVD TLK: CPT | Performed by: INTERNAL MEDICINE

## 2021-11-04 PROCEDURE — G8427 DOCREV CUR MEDS BY ELIG CLIN: HCPCS | Performed by: INTERNAL MEDICINE

## 2021-11-04 PROCEDURE — G8484 FLU IMMUNIZE NO ADMIN: HCPCS | Performed by: INTERNAL MEDICINE

## 2021-11-04 PROCEDURE — 99214 OFFICE O/P EST MOD 30 MIN: CPT | Performed by: INTERNAL MEDICINE

## 2021-11-04 RX ORDER — LINACLOTIDE 290 UG/1
290 CAPSULE, GELATIN COATED ORAL
Qty: 30 CAPSULE | Refills: 3 | Status: SHIPPED | OUTPATIENT
Start: 2021-11-04 | End: 2021-11-22

## 2021-11-04 ASSESSMENT — ENCOUNTER SYMPTOMS
BACK PAIN: 1
DIARRHEA: 1
NAUSEA: 0
VOMITING: 0
CONSTIPATION: 1
SHORTNESS OF BREATH: 0
COUGH: 0
ANAL BLEEDING: 0
ABDOMINAL DISTENTION: 0
BLOOD IN STOOL: 0

## 2021-11-04 NOTE — PROGRESS NOTES
GI OFFICE FOLLOW UP    Laura Frazier is a 38 y.o. female evaluated via on 11/4/2021. Consent:  She and/or health care decision maker is aware that that she may receive a bill for this telephone service, depending on her insurance coverage, and has provided verbal consent to proceed: YES      INTERVAL HISTORY:   No referring provider defined for this encounter. Chief Complaint   Patient presents with    Follow-up     colonoscopy       1. Slow transit constipation    2. Redundant colon       The patient is here as a follow up of her recent GI procedure. The results have been sent to you separately   The findings were explained to the patient in detail and biopsies were also discussed   with her    This patient had a recent colonoscopy done by me previously she had a CT scan at that in [place] which has revealed some questionable abnormality however colonoscopy did not reveal any significant pathology she has history for chronic constipation has mild some mild redundancy of the colon with some retained stools and floppy colon besides hemorrhoids no gross pathology was noted these results were informed to the patient      After the colonoscopy she still complains of constipation issues she does not have a bowel movement for about a week she says she denies any overt bleeding melanotic stools has associated abdominal bloating gas off-and-on cramping has no known family history for colon cancer she is a smoker she works at a gas station she has no      Significant dysphagia nausea vomiting hematemesis  No significant weight loss or loss of appetite    HISTORY OF PRESENT ILLNESS: Aniya Guerra is a 38 y.o. female with a past history remarkable for , referred for evaluation of   Chief Complaint   Patient presents with    Follow-up     colonoscopy   .     Past Medical,Family, and Social History reviewed and does contribute to the patient presenting condition. Patient's PMH/PSH,SH,PSYCH Hx, MEDs, ALLERGIES, and ROS were all reviewed and updated in the appropriate sections.     PAST MEDICAL HISTORY:  Past Medical History:   Diagnosis Date    Abdominal pain     feels crampy/colicky    Abnormal CT of the abdomen     Acute upper respiratory infection     Arthritis     OA in lower back     Coccyx contusion     Fractured coccyx ([address]     H/O acute pharyngitis     H/O acute sinusitis     H/O contact dermatitis     H/O nausea and vomiting     H/O pyelonephritis     Laryngitis 08/2021    hopitalized in [facility] for 4-5 days for trouble breathing, intubated for 4 hours    Lump of skin     Sciatica     right side    SOB (shortness of breath) on exertion 08/24/2021    UTI (urinary tract infection)     frequent       Past Surgical History:   Procedure Laterality Date    COLONOSCOPY N/A 8/27/2021    COLONOSCOPY DIAGNOSTIC performed by Alexandra Castañeda MD at [address] [place] [address]      with endometrial ablation    OTHER SURGICAL HISTORY Right 12/19/2016    right sided lumbar facet steroid injection    OTHER SURGICAL HISTORY  01/12/2017    lumbar medial nerve block L2-L5    OTHER SURGICAL HISTORY  05/2000    D & C    PARTIAL HYSTERECTOMY      TUBAL LIGATION         CURRENT MEDICATIONS:    Current Outpatient Medications:     linaclotide (LINZESS) 290 MCG CAPS capsule, Take 1 capsule by mouth every morning (before breakfast), Disp: 30 capsule, Rfl: 3    albuterol sulfate HFA (VENTOLIN HFA) 108 (90 Base) MCG/ACT inhaler, Inhale 2 puffs into the lungs 4 times daily as needed for Wheezing, Disp: 1 Inhaler, Rfl: 2    buPROPion (WELLBUTRIN XL) 150 MG extended release tablet, Take 1 tablet by mouth every morning (Patient not taking: Reported on 11/4/2021), Disp: 30 tablet, Rfl: 2    fluticasone-salmeterol (ADVAIR DISKUS) 250-50 MCG/DOSE AEPB, Inhale 1 puff into the lungs every 12 hours (Patient not taking: Reported on 11/4/2021), Disp: 60 each, Rfl: 3    polyethylene glycol (GLYCOLAX) 17 GM/SCOOP powder, Follow Instructions provided by physician's office (Patient not taking: Reported on 9/9/2021), Disp: 238 g, Rfl: 0    albuterol (PROVENTIL) (2.5 MG/3ML) 0.083% nebulizer solution, Take 3 mLs by nebulization every 4 hours as needed for Wheezing or Shortness of Breath (Patient not taking: Reported on 11/4/2021), Disp: 120 vial, Rfl: 0    ALLERGIES:   Allergies   Allergen Reactions    Tramadol Other (See Comments) and Hives     Feels intoxicated for a long time  Keeps her up  Feels intoxicated for a long time    Ciprofloxacin     Vicodin [Hydrocodone-Acetaminophen] Other (See Comments)     Feels like bugs crawling all over    Adhesive Tape Rash     OK to use paper tape per patient    Percocet [Oxycodone-Acetaminophen] Nausea And Vomiting       FAMILY HISTORY:       Problem Relation Age of Onset    Heart Disease Mother     High Blood Pressure Mother     High Cholesterol Mother     Arthritis Father     Other Maternal Grandmother         Mantel cell lymphoma    Other Paternal Aunt         oral cancer         SOCIAL HISTORY:   Social History     Socioeconomic History    Marital status: Single     Spouse name: Not on file    Number of children: Not on file    Years of education: Not on file    Highest education level: Not on file   Occupational History    Not on file   Tobacco Use    Smoking status: Current Every Day Smoker     Packs/day: 0.50     Years: 32.00     Pack years: 16.00     Types: Cigarettes    Smokeless tobacco: Never Used   Vaping Use    Vaping Use: Some days    Passive vaping exposure Yes   Substance and Sexual Activity    Alcohol use: Not Currently     Alcohol/week: 0.0 standard drinks    Drug use: No    Sexual activity: Not on file   Other Topics Concern    Not on file   Social History Narrative    Not on file     Social Determinants of Health     Financial Resource Strain: Low Risk     Difficulty of Paying Living Expenses: Not hard at all   Food Insecurity: No Food Insecurity    Worried About Running Out of Food in the Last Year: Never true    Mira of Food in the Last Year: Never true   Transportation Needs:     Lack of Transportation (Medical):  Lack of Transportation (Non-Medical):    Physical Activity:     Days of Exercise per Week:     Minutes of Exercise per Session:    Stress:     Feeling of Stress :    Social Connections:     Frequency of Communication with Friends and Family:     Frequency of Social Gatherings with Friends and Family:     Attends Taoist Services:     Active Member of Clubs or Organizations:     Attends Club or Organization Meetings:     Marital Status:    Intimate Partner Violence:     Fear of Current or Ex-Partner:     Emotionally Abused:     Physically Abused:     Sexually Abused:          REVIEW OF SYSTEMS:         Review of Systems   Respiratory: Negative for cough and shortness of breath. Cardiovascular: Negative for chest pain and leg swelling. Gastrointestinal: Positive for constipation and diarrhea. Negative for abdominal distention, anal bleeding, blood in stool, nausea and vomiting. Genitourinary: Negative for difficulty urinating. Musculoskeletal: Positive for back pain. Negative for arthralgias, gait problem and joint swelling. Neurological: Negative for dizziness, light-headedness, numbness and headaches. Hematological: Bruises/bleeds easily. Psychiatric/Behavioral: Positive for sleep disturbance. PHYSICAL EXAMINATION: Vital signs reviewed per the nursing documentation. BP 95/66   Pulse 65   Temp 98.3 °F (36.8 °C)   Ht 5' (1.524 m)   Wt 118 lb 12.8 oz (53.9 kg)   SpO2 100%   BMI 23.20 kg/m²   Body mass index is 23.2 kg/m². Physical Exam  Nursing note reviewed. Constitutional:       Appearance: She is well-developed.       Comments: Anxious    HENT:      Head: Normocephalic and atraumatic. Eyes:      Conjunctiva/sclera: Conjunctivae normal.      Pupils: Pupils are equal, round, and reactive to light. Cardiovascular:      Heart sounds: Normal heart sounds. Pulmonary:      Effort: Pulmonary effort is normal.      Breath sounds: Normal breath sounds. Abdominal:      General: Bowel sounds are normal.      Palpations: Abdomen is soft. Comments: NON TENDER, NON DISTENTED  LIVER SPLEEN AND HERNIAS ARE NOT  PALPABLE  BOWEL SOUNDS ARE POSITIVE      Musculoskeletal:         General: Normal range of motion. Cervical back: Normal range of motion and neck supple. Skin:     General: Skin is warm. Neurological:      Mental Status: She is alert and oriented to person, place, and time. Psychiatric:         Behavior: Behavior normal.           LABORATORY DATA: Reviewed  No results found for: WBC, HGB, HCT, MCV, PLT, NA, K, CL, CO2, BUN, CREATININE, LABPROT, LABALBU, GGT, BILITOT, ALKPHOS, AST, ALT, INR      No results found for: RBC, HGB, MCV, MCH, MCHC, RDW, MPV, BASOPCT, LYMPHSABS, MONOSABS, NEUTROABS, EOSABS, BASOSABS      DIAGNOSTIC TESTING:     No results found. Assessment  1. Slow transit constipation    2. Redundant colon        Plan    Start Linzess 290 mg    Pt was given instructions and advice in detail about the symptom of constipation. She was explained about avoidance of fast food, soda pops, cheese and red meat. Was also told to avoid sedatives narcotics and pain killers if possible. Pt was advised to start drinking ample amount of water and liquid. Was told to adapt and follow an exercise regimen. Instructions were given to increase the amount of fiber including dietary in terms of bran, cereals, whole wheat, brown bread etc. Was also instructed to start using supplemental fiber either Metamucil, citrucell or bennafiber with ample liquids. She was told to start drinking prune juice which is good for constipation.     If symptoms don't resolve she will require medicines to assist with her symptoms    Pt has verbalized understanding and agreement to this plan. The patient was instructed to start taking some OTC Probiotics products   These are available over the counter at the Pharmacy stores and Grocery stores  He was explained about the beneficial effects they have in the GI track  They will help to establish the good bacterial latesha and will help with the digestion and bowel movements  The patient has verbalized understanding and agreement to this plan    Pt was advised in detail about some life style and dietary modifications. She was advised about avoidance of caffeine, nicotine and chocolate. Pt was also told to stay away from any kind of fast foods, soda pops. She was also advised to avoid lots of spices, grease and fried food etc.     Instructions were also given about trying to arrange the timing, quality and quantity of food. Instructions were given about using ample amount of fiber including dietary and supplemental fiber either metamucil, bennafiber or citrucell etc.  Pt was advised about drinking ample amount of water without any colors or chemicals. Stress was given about regular exercise. Pt has verbalized understanding and agreement to these modifications. Bulking agents      I communicated with the patient and/or health care decision maker about   Details of this discussion including any medical advice provided:YES      I affirm this is a Patient Initiated Episode with an Established Patient who has not had a related appointment within my department in the past 7 days or scheduled within the next 24 hours. Total Time: minutes: 21-30 minutes    Note: not billable if this call serves to triage the patient into an appointment for the relevant concern      Thank you for allowing me to participate in the care of Ms. Madilyn Brittle. For any further questions please do not hesitate to contact me.     I have reviewed and agree with the ROS entered by the MA/JENNIE.          Kelvin Shea MD, [facility]  Board Certified in Gastroenterology and [address] [place] [address] Gastroenterology  Office #: [phone]

## 2021-11-22 ENCOUNTER — OFFICE VISIT (OUTPATIENT)
Dept: PRIMARY CARE CLINIC | Age: 41
End: 2021-11-22
Payer: COMMERCIAL

## 2021-11-22 VITALS
OXYGEN SATURATION: 98 % | DIASTOLIC BLOOD PRESSURE: 68 MMHG | HEIGHT: 60 IN | HEART RATE: 90 BPM | BODY MASS INDEX: 23.91 KG/M2 | SYSTOLIC BLOOD PRESSURE: 98 MMHG | WEIGHT: 121.8 LBS

## 2021-11-22 DIAGNOSIS — R06.2 WHEEZING: ICD-10-CM

## 2021-11-22 DIAGNOSIS — Z00.00 HEALTH CARE MAINTENANCE: ICD-10-CM

## 2021-11-22 DIAGNOSIS — F17.210 CIGARETTE NICOTINE DEPENDENCE WITHOUT COMPLICATION: ICD-10-CM

## 2021-11-22 DIAGNOSIS — J45.40 MODERATE PERSISTENT ASTHMA WITHOUT COMPLICATION: Primary | ICD-10-CM

## 2021-11-22 DIAGNOSIS — R06.02 SHORTNESS OF BREATH: ICD-10-CM

## 2021-11-22 PROCEDURE — G8420 CALC BMI NORM PARAMETERS: HCPCS | Performed by: PHYSICIAN ASSISTANT

## 2021-11-22 PROCEDURE — 4004F PT TOBACCO SCREEN RCVD TLK: CPT | Performed by: PHYSICIAN ASSISTANT

## 2021-11-22 PROCEDURE — G8484 FLU IMMUNIZE NO ADMIN: HCPCS | Performed by: PHYSICIAN ASSISTANT

## 2021-11-22 PROCEDURE — G8427 DOCREV CUR MEDS BY ELIG CLIN: HCPCS | Performed by: PHYSICIAN ASSISTANT

## 2021-11-22 PROCEDURE — 99213 OFFICE O/P EST LOW 20 MIN: CPT | Performed by: PHYSICIAN ASSISTANT

## 2021-11-22 RX ORDER — BUPROPION HYDROCHLORIDE 150 MG/1
150 TABLET ORAL EVERY MORNING
Qty: 30 TABLET | Refills: 2 | Status: SHIPPED | OUTPATIENT
Start: 2021-11-22 | End: 2022-07-12 | Stop reason: ALTCHOICE

## 2021-11-22 ASSESSMENT — ENCOUNTER SYMPTOMS
NAUSEA: 0
EYE REDNESS: 0
DIARRHEA: 0
EYE DISCHARGE: 0
SHORTNESS OF BREATH: 1
RHINORRHEA: 0
SORE THROAT: 0
VOMITING: 0
WHEEZING: 0
COUGH: 1
ABDOMINAL PAIN: 0

## 2021-11-22 NOTE — PROGRESS NOTES
[facility] PRIMARY CARE  [address] [place] [address]  Dept: [phone]    Laura Frazier is a 38 y.o. female who presents today for her medical conditions/complaints as noted below. Chief Complaint   Patient presents with    Follow-up     asthma       HPI:     HPI  Advair: not started yet. Using rescue inhaler especially when cold    Wellbutrin:did not get. Switched from [employer] to [employer] (organic). Smoking less ---1 pk every 2.5 days. No vape.        No results found for: LDLCHOLESTEROL, LDLCALC    (goal LDL is <100)   No results found for: AST, ALT, BUN  BP Readings from Last 3 Encounters:   11/22/21 98/68   11/04/21 95/66   09/09/21 101/64          (goal 120/80)    Past Medical History:   Diagnosis Date    Abdominal pain     feels crampy/colicky    Abnormal CT of the abdomen     Acute upper respiratory infection     Arthritis     OA in lower back     Coccyx contusion     Fractured coccyx ([address]     H/O acute pharyngitis     H/O acute sinusitis     H/O contact dermatitis     H/O nausea and vomiting     H/O pyelonephritis     Laryngitis 08/2021    hopitalized in [facility] for 4-5 days for trouble breathing, intubated for 4 hours    Lump of skin     Sciatica     right side    SOB (shortness of breath) on exertion 08/24/2021    UTI (urinary tract infection)     frequent      Past Surgical History:   Procedure Laterality Date    COLONOSCOPY N/A 8/27/2021    COLONOSCOPY DIAGNOSTIC performed by Melina Gallagher MD at [address] [place] [address]      with endometrial ablation    OTHER SURGICAL HISTORY Right 12/19/2016    right sided lumbar facet steroid injection    OTHER SURGICAL HISTORY  01/12/2017    lumbar medial nerve block L2-L5    OTHER SURGICAL HISTORY  05/2000    D & C    PARTIAL HYSTERECTOMY      TUBAL LIGATION         Family History   Problem Relation Age of Onset    Heart Disease Mother     High Blood Pressure Mother     High Cholesterol Mother     Arthritis Father     Other Maternal Grandmother         Mantel cell lymphoma    Other Paternal Aunt         oral cancer       Social History     Tobacco Use    Smoking status: Current Every Day Smoker     Packs/day: 0.50     Years: 32.00     Pack years: 16.00     Types: Cigarettes    Smokeless tobacco: Never Used   Substance Use Topics    Alcohol use: Not Currently     Alcohol/week: 0.0 standard drinks      Current Outpatient Medications   Medication Sig Dispense Refill    buPROPion (WELLBUTRIN XL) 150 MG extended release tablet Take 1 tablet by mouth every morning 30 tablet 2    fluticasone-salmeterol (ADVAIR DISKUS) 250-50 MCG/DOSE AEPB Inhale 1 puff into the lungs every 12 hours 60 each 3    albuterol (PROVENTIL) (2.5 MG/3ML) 0.083% nebulizer solution Take 3 mLs by nebulization every 4 hours as needed for Wheezing or Shortness of Breath 120 vial 0    albuterol sulfate HFA (VENTOLIN HFA) 108 (90 Base) MCG/ACT inhaler Inhale 2 puffs into the lungs 4 times daily as needed for Wheezing 1 Inhaler 2     No current facility-administered medications for this visit.      Allergies   Allergen Reactions    Tramadol Other (See Comments) and Hives     Feels intoxicated for a long time  Keeps her up  Feels intoxicated for a long time    Ciprofloxacin     Vicodin [Hydrocodone-Acetaminophen] Other (See Comments)     Feels like bugs crawling all over    Adhesive Tape Rash     OK to use paper tape per patient    Percocet [Oxycodone-Acetaminophen] Nausea And Vomiting       Health Maintenance   Topic Date Due    Hepatitis C screen  Never done    COVID-19 Vaccine (1) Never done    HIV screen  Never done    Lipid screen  Never done    Flu vaccine (1) 09/09/2022 (Originally 9/1/2021)    DTaP/Tdap/Td vaccine (3 - Td or Tdap) 04/19/2027    Pneumococcal 0-64 years Vaccine (2 of 2 - PPSV23) 12/14/2044    Hepatitis A vaccine  Aged Out    Hepatitis B vaccine  Aged Out    Hib vaccine  Aged Out    Meningococcal (ACWY) vaccine  Aged Out       Subjective:      Review of Systems   Constitutional: Negative for chills, diaphoresis and fever. HENT: Negative for rhinorrhea and sore throat. Eyes: Negative for discharge and redness. Respiratory: Positive for cough and shortness of breath. Negative for wheezing. Cardiovascular: Negative for chest pain and palpitations. Gastrointestinal: Negative for abdominal pain, diarrhea, nausea and vomiting. Genitourinary: Negative for dysuria and frequency. Musculoskeletal: Negative for arthralgias and myalgias. Neurological: Negative for dizziness, light-headedness and headaches. Psychiatric/Behavioral: Negative for sleep disturbance. Objective:     BP 98/68   Pulse 90   Ht 5' (1.524 m)   Wt 121 lb 12.8 oz (55.2 kg)   SpO2 98%   BMI 23.79 kg/m²   Physical Exam  Vitals and nursing note reviewed. Constitutional:       Appearance: Normal appearance. Cardiovascular:      Rate and Rhythm: Normal rate and regular rhythm. Heart sounds: Normal heart sounds. Pulmonary:      Effort: Pulmonary effort is normal.      Breath sounds: Normal breath sounds. Neurological:      Mental Status: She is alert and oriented to person, place, and time. Psychiatric:         Mood and Affect: Mood normal.         Behavior: Behavior normal.         Thought Content: Thought content normal.         Judgment: Judgment normal.         Assessment:       Diagnosis Orders   1. Moderate persistent asthma without complication     2. Wheezing  fluticasone-salmeterol (ADVAIR DISKUS) 250-50 MCG/DOSE AEPB   3. Shortness of breath  fluticasone-salmeterol (ADVAIR DISKUS) 250-50 MCG/DOSE AEPB   4. Health care maintenance  Lipid Panel    ALT    AST   5.  Cigarette nicotine dependence without complication          Plan:    Please see if the definitve PFT result was ever sent  Start Advair  Start Wellbutrin  If pharmacy says they did not receive, then call us  Lipids ordered    Return in about 3 months (around 2/22/2022) for asthma and tobacco cessation. Orders Placed This Encounter   Procedures    Lipid Panel     Standing Status:   Future     Standing Expiration Date:   11/22/2022     Order Specific Question:   Is Patient Fasting?/# of Hours     Answer:   10-12 hours    ALT     Standing Status:   Future     Standing Expiration Date:   11/22/2022    AST     Standing Status:   Future     Standing Expiration Date:   11/22/2022     Orders Placed This Encounter   Medications    buPROPion (WELLBUTRIN XL) 150 MG extended release tablet     Sig: Take 1 tablet by mouth every morning     Dispense:  30 tablet     Refill:  2    fluticasone-salmeterol (ADVAIR DISKUS) 250-50 MCG/DOSE AEPB     Sig: Inhale 1 puff into the lungs every 12 hours     Dispense:  60 each     Refill:  3       Patient given educational materials - see patient instructions. Discussed use, benefit, and side effects of prescribed medications. All patient questions answered. Pt voiced understanding. Reviewed health maintenance. Instructed to continue current medications, diet and exercise. Patient agreed with treatment plan. Follow up as directed.      Electronically signed by Reece Claude, PA-C on 11/22/2021 at 11:36 AM

## 2022-01-22 ENCOUNTER — HOSPITAL ENCOUNTER (EMERGENCY)
Age: 42
Discharge: HOME | End: 2022-01-22
Attending: EMERGENCY MEDICINE
Payer: COMMERCIAL

## 2022-01-22 ENCOUNTER — APPOINTMENT (OUTPATIENT)
Dept: CT IMAGING | Age: 42
End: 2022-01-22
Payer: COMMERCIAL

## 2022-01-22 VITALS
HEIGHT: 63 IN | SYSTOLIC BLOOD PRESSURE: 147 MMHG | HEART RATE: 87 BPM | RESPIRATION RATE: 18 BRPM | TEMPERATURE: 98.7 F | BODY MASS INDEX: 38.98 KG/M2 | OXYGEN SATURATION: 98 % | WEIGHT: 220 LBS | DIASTOLIC BLOOD PRESSURE: 74 MMHG

## 2022-01-22 DIAGNOSIS — J45.21 EXACERBATION OF INTERMITTENT ASTHMA, UNSPECIFIED ASTHMA SEVERITY: ICD-10-CM

## 2022-01-22 DIAGNOSIS — I10 UNCONTROLLED HYPERTENSION: ICD-10-CM

## 2022-01-22 DIAGNOSIS — J40 BRONCHITIS: Primary | ICD-10-CM

## 2022-01-22 DIAGNOSIS — R07.89 CHEST WALL PAIN: ICD-10-CM

## 2022-01-22 LAB
ALBUMIN SERPL-MCNC: 4.3 G/DL (ref 3.5–5.2)
ALP BLD-CCNC: 74 U/L (ref 35–104)
ALT SERPL-CCNC: 17 U/L (ref 0–32)
ANION GAP SERPL CALCULATED.3IONS-SCNC: 12 MMOL/L (ref 7–16)
APTT: 30.7 SEC (ref 24.5–35.1)
AST SERPL-CCNC: 16 U/L (ref 0–31)
BACTERIA: ABNORMAL /HPF
BASOPHILS ABSOLUTE: 0.07 E9/L (ref 0–0.2)
BASOPHILS RELATIVE PERCENT: 0.6 % (ref 0–2)
BILIRUB SERPL-MCNC: 0.4 MG/DL (ref 0–1.2)
BILIRUBIN URINE: NEGATIVE
BLOOD, URINE: ABNORMAL
BUN BLDV-MCNC: 10 MG/DL (ref 6–20)
CALCIUM SERPL-MCNC: 9.2 MG/DL (ref 8.6–10.2)
CHLORIDE BLD-SCNC: 101 MMOL/L (ref 98–107)
CLARITY: ABNORMAL
CO2: 25 MMOL/L (ref 22–29)
COLOR: ABNORMAL
CREAT SERPL-MCNC: 0.7 MG/DL (ref 0.5–1)
EOSINOPHILS ABSOLUTE: 0.67 E9/L (ref 0.05–0.5)
EOSINOPHILS RELATIVE PERCENT: 5.9 % (ref 0–6)
EPITHELIAL CELLS, UA: ABNORMAL /HPF
GFR AFRICAN AMERICAN: >60
GFR NON-AFRICAN AMERICAN: >60 ML/MIN/1.73
GLUCOSE BLD-MCNC: 108 MG/DL (ref 74–99)
GLUCOSE URINE: NEGATIVE MG/DL
HCG, URINE, POC: NEGATIVE
HCT VFR BLD CALC: 39 % (ref 34–48)
HEMOGLOBIN: 12.5 G/DL (ref 11.5–15.5)
IMMATURE GRANULOCYTES #: 0.03 E9/L
IMMATURE GRANULOCYTES %: 0.3 % (ref 0–5)
INR BLD: 1.1
KETONES, URINE: NEGATIVE MG/DL
LACTIC ACID: 1.3 MMOL/L (ref 0.5–2.2)
LEUKOCYTE ESTERASE, URINE: NEGATIVE
LYMPHOCYTES ABSOLUTE: 2.59 E9/L (ref 1.5–4)
LYMPHOCYTES RELATIVE PERCENT: 22.8 % (ref 20–42)
Lab: NORMAL
MCH RBC QN AUTO: 27.1 PG (ref 26–35)
MCHC RBC AUTO-ENTMCNC: 32.1 % (ref 32–34.5)
MCV RBC AUTO: 84.4 FL (ref 80–99.9)
MONOCYTES ABSOLUTE: 0.59 E9/L (ref 0.1–0.95)
MONOCYTES RELATIVE PERCENT: 5.2 % (ref 2–12)
NEGATIVE QC PASS/FAIL: NORMAL
NEUTROPHILS ABSOLUTE: 7.41 E9/L (ref 1.8–7.3)
NEUTROPHILS RELATIVE PERCENT: 65.2 % (ref 43–80)
NITRITE, URINE: NEGATIVE
PDW BLD-RTO: 13.2 FL (ref 11.5–15)
PH UA: 6 (ref 5–9)
PLATELET # BLD: 265 E9/L (ref 130–450)
PMV BLD AUTO: 10.6 FL (ref 7–12)
POSITIVE QC PASS/FAIL: NORMAL
POTASSIUM REFLEX MAGNESIUM: 4.1 MMOL/L (ref 3.5–5)
PRO-BNP: 11 PG/ML (ref 0–125)
PROTEIN UA: NEGATIVE MG/DL
PROTHROMBIN TIME: 12.1 SEC (ref 9.3–12.4)
RBC # BLD: 4.62 E12/L (ref 3.5–5.5)
RBC UA: ABNORMAL /HPF (ref 0–2)
SARS-COV-2, NAAT: NOT DETECTED
SODIUM BLD-SCNC: 138 MMOL/L (ref 132–146)
SPECIFIC GRAVITY UA: >=1.03 (ref 1–1.03)
TOTAL PROTEIN: 7.6 G/DL (ref 6.4–8.3)
TROPONIN, HIGH SENSITIVITY: <6 NG/L (ref 0–9)
UROBILINOGEN, URINE: 0.2 E.U./DL
WBC # BLD: 11.4 E9/L (ref 4.5–11.5)
WBC UA: ABNORMAL /HPF (ref 0–5)

## 2022-01-22 PROCEDURE — 6360000004 HC RX CONTRAST MEDICATION: Performed by: STUDENT IN AN ORGANIZED HEALTH CARE EDUCATION/TRAINING PROGRAM

## 2022-01-22 PROCEDURE — 94664 DEMO&/EVAL PT USE INHALER: CPT

## 2022-01-22 PROCEDURE — 99284 EMERGENCY DEPT VISIT MOD MDM: CPT

## 2022-01-22 PROCEDURE — 71275 CT ANGIOGRAPHY CHEST: CPT

## 2022-01-22 PROCEDURE — 87635 SARS-COV-2 COVID-19 AMP PRB: CPT

## 2022-01-22 PROCEDURE — 80053 COMPREHEN METABOLIC PANEL: CPT

## 2022-01-22 PROCEDURE — 2580000003 HC RX 258: Performed by: EMERGENCY MEDICINE

## 2022-01-22 PROCEDURE — 83880 ASSAY OF NATRIURETIC PEPTIDE: CPT

## 2022-01-22 PROCEDURE — 85610 PROTHROMBIN TIME: CPT

## 2022-01-22 PROCEDURE — 93005 ELECTROCARDIOGRAM TRACING: CPT | Performed by: EMERGENCY MEDICINE

## 2022-01-22 PROCEDURE — 84484 ASSAY OF TROPONIN QUANT: CPT

## 2022-01-22 PROCEDURE — 94640 AIRWAY INHALATION TREATMENT: CPT

## 2022-01-22 PROCEDURE — 81001 URINALYSIS AUTO W/SCOPE: CPT

## 2022-01-22 PROCEDURE — 85730 THROMBOPLASTIN TIME PARTIAL: CPT

## 2022-01-22 PROCEDURE — 85025 COMPLETE CBC W/AUTO DIFF WBC: CPT

## 2022-01-22 PROCEDURE — 6370000000 HC RX 637 (ALT 250 FOR IP): Performed by: EMERGENCY MEDICINE

## 2022-01-22 PROCEDURE — 83605 ASSAY OF LACTIC ACID: CPT

## 2022-01-22 RX ORDER — ALBUTEROL SULFATE 2.5 MG/3ML
2.5 SOLUTION RESPIRATORY (INHALATION) EVERY 6 HOURS PRN
Qty: 30 EACH | Refills: 0 | Status: SHIPPED | OUTPATIENT
Start: 2022-01-22

## 2022-01-22 RX ORDER — LOSARTAN POTASSIUM 100 MG/1
100 TABLET ORAL DAILY
Qty: 30 TABLET | Refills: 0 | Status: SHIPPED | OUTPATIENT
Start: 2022-01-22

## 2022-01-22 RX ORDER — ALBUTEROL SULFATE 90 UG/1
2 AEROSOL, METERED RESPIRATORY (INHALATION) 4 TIMES DAILY PRN
Qty: 54 G | Refills: 0 | Status: SHIPPED | OUTPATIENT
Start: 2022-01-22

## 2022-01-22 RX ORDER — ACETAMINOPHEN 500 MG
1000 TABLET ORAL ONCE
Status: COMPLETED | OUTPATIENT
Start: 2022-01-22 | End: 2022-01-22

## 2022-01-22 RX ORDER — PREDNISONE 20 MG/1
40 TABLET ORAL ONCE
Status: COMPLETED | OUTPATIENT
Start: 2022-01-22 | End: 2022-01-22

## 2022-01-22 RX ORDER — PREDNISONE 20 MG/1
40 TABLET ORAL DAILY
Qty: 10 TABLET | Refills: 0 | Status: SHIPPED | OUTPATIENT
Start: 2022-01-22 | End: 2022-01-27

## 2022-01-22 RX ORDER — IPRATROPIUM BROMIDE AND ALBUTEROL SULFATE 2.5; .5 MG/3ML; MG/3ML
1 SOLUTION RESPIRATORY (INHALATION)
Status: COMPLETED | OUTPATIENT
Start: 2022-01-22 | End: 2022-01-22

## 2022-01-22 RX ORDER — 0.9 % SODIUM CHLORIDE 0.9 %
1000 INTRAVENOUS SOLUTION INTRAVENOUS ONCE
Status: COMPLETED | OUTPATIENT
Start: 2022-01-22 | End: 2022-01-22

## 2022-01-22 RX ADMIN — IPRATROPIUM BROMIDE AND ALBUTEROL SULFATE 1 AMPULE: 2.5; .5 SOLUTION RESPIRATORY (INHALATION) at 11:09

## 2022-01-22 RX ADMIN — PREDNISONE 40 MG: 20 TABLET ORAL at 11:01

## 2022-01-22 RX ADMIN — IPRATROPIUM BROMIDE AND ALBUTEROL SULFATE 1 AMPULE: 2.5; .5 SOLUTION RESPIRATORY (INHALATION) at 11:08

## 2022-01-22 RX ADMIN — IOPAMIDOL 75 ML: 755 INJECTION, SOLUTION INTRAVENOUS at 09:39

## 2022-01-22 RX ADMIN — SODIUM CHLORIDE 1000 ML: 9 INJECTION, SOLUTION INTRAVENOUS at 11:01

## 2022-01-22 RX ADMIN — IPRATROPIUM BROMIDE AND ALBUTEROL SULFATE 1 AMPULE: 2.5; .5 SOLUTION RESPIRATORY (INHALATION) at 11:07

## 2022-01-22 RX ADMIN — ACETAMINOPHEN 1000 MG: 500 TABLET ORAL at 11:01

## 2022-01-22 ASSESSMENT — PAIN DESCRIPTION - LOCATION: LOCATION: BACK

## 2022-01-22 ASSESSMENT — PAIN SCALES - GENERAL
PAINLEVEL_OUTOF10: 4
PAINLEVEL_OUTOF10: 4

## 2022-01-22 ASSESSMENT — PAIN DESCRIPTION - PAIN TYPE: TYPE: ACUTE PAIN

## 2022-01-22 ASSESSMENT — PAIN DESCRIPTION - FREQUENCY: FREQUENCY: CONTINUOUS

## 2022-01-22 ASSESSMENT — PAIN DESCRIPTION - PROGRESSION: CLINICAL_PROGRESSION: NOT CHANGED

## 2022-01-22 ASSESSMENT — PAIN DESCRIPTION - DESCRIPTORS: DESCRIPTORS: PATIENT UNABLE TO DESCRIBE

## 2022-01-22 NOTE — ED PROVIDER NOTES
Chief Complaint   Patient presents with    Hypertension     increasing for wk    Chest Pain    Back Pain       HPI  Edgard Ram is a 42 y.o. female who presents with HTN, intermittent chest pain and chronic back pain. The complaints are persistent, moderate in severity, worsened by activity and improved by nothing. The patient states that her BP has been increasing over the last two weeks. She says she has been taking her medicine as directed (her cardiologist recently doubled her Losartan). She says her SBP has been in the 170s to 180s during the last few days. She reports mild chest discomfort today which she describes as a pressure sensation that localizes to her midchest and does not radiate anywhere. She says she has chronic low back pain The patient denies recent trauma, fever, chills, fatigue, HA, dizziness, lightheadedness, paresthesias, generalized weakness, confusion, forgetfulness, vision changes, eye redness, congestion, rhinorrhea, sore throat, neck pain, palpitations, LE edema, SOB, cough, wheezing, abdominal pain, nausea, vomiting, diarrhea, constipation, hematochezia, melena, dysuria, hematuria, flank pain, decreased UOP, arthralgias, ROM issues, swelling, rashes and erythema. ROS is otherwise negative. The patient is currently taking no blood thinners. Tobacco Hx:   reports that she has never smoked. She has never used smokeless tobacco.    Alcohol Hx:   reports no history of alcohol use. Illicit Drug Hx:   reports no history of drug use. The history is provided by the patient. Last Tetanus (if applicable): n/a    Review of Systems:   A complete review of systems was performed and pertinent positives and negatives are stated within the HPI, all other systems reviewed and are negative.     Physical Exam:  GEN: Cooperative, well-developed, well-nourished adult in NAD; pt appears stated age  Head: Normocephalic and atraumatic; no tenderness to palpation anywhere  Eyes: PERRL, EOMI, conjunctiva clear, cornea without gross abnormality, no visual deficits noted b/l  Ears: External ear normal-appearing and non-tender b/l; no hearing deficit noted  Nose: Nares patent and free of debris; septum midline; mucosa appears normal; no drainage noted  Throat: Oral mucosa moist and pink with no lesions noted; dentition wnl; no posterior pharyngeal erythema or edema; tonsils are not swollen and there is no exudate noted; no post-nasal drip  Neck: Supple and symmetrical with midline trachea; no cervical or supraclavicular lymphadenopathy noted; thyroid not palpated, but no tenderness or masses noted in the region; normal ROM with no meningeal signs  Lungs: Good overall air movement noted throughout with faint wheezes bilaterally; lungs otherwise CTAB with no rhonchi or rales noted; no respiratory distress, breathing unlabored   CV: Tachycardic with regular rhythm, no murmurs, gallops or rubs noted on auscultation, normal S1/S2; UE and LE distal pulses intact b/l +2/4 with no cyanosis noted; no LE edema noted b/l; capillary refill < 2 seconds  ABD: Soft, non-tender, non-distended, no organomegaly, hernias or masses noted  /Rectal: no suprapubic tenderness; remainder of exam deferred  MSK: UEs and LEs without notable trauma, ROM restriction or tenderness to palpation b/l; normal strength throughout  Skin: Skin warm and dry without notable rash, erythema, bruising or lesion; normal turgor  Neuro: A&O x3; CN II-XII appear grossly intact; no focal deficits appreciated; pt thoughtful in discussion and able to answer all questions without issue  Psych: normal attention with no obvious AVH, normal mood, normal affect    ---------------------------------- PAST HISTORY ---------------------------------------------  Past Medical History:  has a past medical history of Abnormal Pap smear of cervix, Diabetes mellitus ([facility] [address] Gestational diabetes, Pre-eclampsia, and Thyroid disease.     Past Surgical History:  has a past surgical history that includes LEEP and  section. Social History:  reports that she has never smoked. She has never used smokeless tobacco. She reports that she does not drink alcohol and does not use drugs. Family History: family history includes Hypertension in her father. Home Meds: Not in a hospital admission. The patients home medications have been reviewed. Allergies: Cephalexin and Lodine [etodolac]    ------------------------- NURSING NOTES AND VITALS REVIEWED ---------------------------  Date / Time Roomed:  2022  7:48 AM  ED Bed Assignment:      The nursing notes within the ED encounter and vital signs as below have been reviewed. BP (!) 141/74   Pulse 94   Temp 97 °F (36.1 °C)   Resp 18   Ht 5' 3\" (1.6 m)   Wt 220 lb (99.8 kg)   LMP 2021   SpO2 98%   BMI 38.97 kg/m²   -------------------------------------------------- RESULTS / INTERVENTIONS -------------------------------------------------  All laboratory and radiology tests have been reviewed by this physician.     LABS:  Results for orders placed or performed during the hospital encounter of 22   COVID-19, Rapid    Specimen: Nasopharyngeal Swab   Result Value Ref Range    SARS-CoV-2, NAAT Not Detected Not Detected   CBC Auto Differential   Result Value Ref Range    WBC 11.4 4.5 - 11.5 E9/L    RBC 4.62 3.50 - 5.50 E12/L    Hemoglobin 12.5 11.5 - 15.5 g/dL    Hematocrit 39.0 34.0 - 48.0 %    MCV 84.4 80.0 - 99.9 fL    MCH 27.1 26.0 - 35.0 pg    MCHC 32.1 32.0 - 34.5 %    RDW 13.2 11.5 - 15.0 fL    Platelets 252 262 - 999 E9/L    MPV 10.6 7.0 - 12.0 fL    Neutrophils % 65.2 43.0 - 80.0 %    Immature Granulocytes % 0.3 0.0 - 5.0 %    Lymphocytes % 22.8 20.0 - 42.0 %    Monocytes % 5.2 2.0 - 12.0 %    Eosinophils % 5.9 0.0 - 6.0 %    Basophils % 0.6 0.0 - 2.0 %    Neutrophils Absolute 7.41 (H) 1.80 - 7.30 E9/L    Immature Granulocytes # 0.03 E9/L    Lymphocytes Absolute 2.59 1.50 - 4.00 E9/L    Monocytes Absolute 0.59 0.10 - 0.95 E9/L    Eosinophils Absolute 0.67 (H) 0.05 - 0.50 E9/L    Basophils Absolute 0.07 0.00 - 0.20 E9/L   Comprehensive Metabolic Panel w/ Reflex to MG   Result Value Ref Range    Sodium 138 132 - 146 mmol/L    Potassium reflex Magnesium 4.1 3.5 - 5.0 mmol/L    Chloride 101 98 - 107 mmol/L    CO2 25 22 - 29 mmol/L    Anion Gap 12 7 - 16 mmol/L    Glucose 108 (H) 74 - 99 mg/dL    BUN 10 6 - 20 mg/dL    CREATININE 0.7 0.5 - 1.0 mg/dL    GFR Non-African American >60 >=60 mL/min/1.73    GFR African American >60     Calcium 9.2 8.6 - 10.2 mg/dL    Total Protein 7.6 6.4 - 8.3 g/dL    Albumin 4.3 3.5 - 5.2 g/dL    Total Bilirubin 0.4 0.0 - 1.2 mg/dL    Alkaline Phosphatase 74 35 - 104 U/L    ALT 17 0 - 32 U/L    AST 16 0 - 31 U/L   Troponin   Result Value Ref Range    Troponin, High Sensitivity <6 0 - 9 ng/L   Brain Natriuretic Peptide   Result Value Ref Range    Pro-BNP 11 0 - 125 pg/mL   Protime-INR   Result Value Ref Range    Protime 12.1 9.3 - 12.4 sec    INR 1.1    APTT   Result Value Ref Range    aPTT 30.7 24.5 - 35.1 sec   Urinalysis, reflex to microscopic   Result Value Ref Range    Color, UA DARK YELLOW (A) Straw/Yellow    Clarity, UA SL CLOUDY Clear    Glucose, Ur Negative Negative mg/dL    Bilirubin Urine Negative Negative    Ketones, Urine Negative Negative mg/dL    Specific Gravity, UA >=1.030 1.005 - 1.030    Blood, Urine SMALL (A) Negative    pH, UA 6.0 5.0 - 9.0    Protein, UA Negative Negative mg/dL    Urobilinogen, Urine 0.2 <2.0 E.U./dL    Nitrite, Urine Negative Negative    Leukocyte Esterase, Urine Negative Negative   Lactic Acid, Plasma   Result Value Ref Range    Lactic Acid 1.3 0.5 - 2.2 mmol/L   Microscopic Urinalysis   Result Value Ref Range    WBC, UA 1-3 0 - 5 /HPF    RBC, UA 2-5 0 - 2 /HPF    Epithelial Cells, UA MODERATE /HPF    Bacteria, UA FEW (A) None Seen /HPF   POC Pregnancy Urine   Result Value Ref Range    HCG, Urine, POC Negative Negative    Lot Number [id number] Positive QC Pass/Fail Pass     Negative QC Pass/Fail Pass    EKG 12 Lead   Result Value Ref Range    Ventricular Rate 84 BPM    Atrial Rate 84 BPM    P-R Interval 132 ms    QRS Duration 70 ms    Q-T Interval 360 ms    QTc Calculation (Bazett) 425 ms    P Axis 28 degrees    R Axis 9 degrees    T Axis 26 degrees       RADIOLOGY: Interpreted by Radiologist unless otherwise noted. CTA PULMONARY W CONTRAST   Final Result   1. There is no pulmonary embolus   2. There are no findings of atypical or COVID pneumonia   3. There is no thoracic aortic aneurysm or dissection   4. Mild bronchial wall thickening of the segmental and subsegmental bronchi   of the right lower lobe which could represent reactive airway disease or   findings of bronchitis. EKG: As interpreted by this ER physician. Rate: 84 bpm  Rhythm: sinus  Axis: normal  ST Segments: no acute changes  T-waves: no acute changes  Interpretation: NSR with minimal voltage criteria for LVH - likely normal variant  Comparison: changes compared to previous EKG    Oxygen Saturation Interpretation: normal    Meds Given:  Medications   0.9 % sodium chloride bolus (1,000 mLs IntraVENous New Bag 1/22/22 1101)   iopamidol (ISOVUE-370) 76 % injection 75 mL (75 mLs IntraVENous Given 1/22/22 0939)   ipratropium-albuterol (DUONEB) nebulizer solution 1 ampule (1 ampule Inhalation Given 1/22/22 1109)   predniSONE (DELTASONE) tablet 40 mg (40 mg Oral Given 1/22/22 1101)   acetaminophen (TYLENOL) tablet 1,000 mg (1,000 mg Oral Given 1/22/22 1101)       Procedures:  No procedures performed. --------------------------------- PROGRESS NOTES / ADDITIONAL PROVIDER NOTES ---------------------------------  Consultations:  As outlined below. ED Course:  ED Course as of 01/22/22 1224   Sat Jan 22, 2022   1138 Patient informed me that her cardiologist recently increased her home dose of losartan from 50 mg/day to 100 mg/day.   She states that she is running short on that medication because of the double dose. She requests that I write her a short prescription for the medicine until she is able to follow-up with her cardiologist to get a long-term prescription. I will write a prescription for 100 mg losartan daily and given to her at discharge. [ML]      ED Course User Index  [ML] Cody GarethDO [email or web address]       12:24 PM: All results were discussed with the patient and I have provided specific details regarding the plan of care, diagnosis and associated prognosis. The patient tolerated the visit well and, at the time of discharge, was without objective evidence of hemodynamic instability or an acute process requiring hospitalization and inpatient management. The patient was seen by myself and the assigned attending physician, Aric Raines DO, who agreed with my assessment and plan as laid out herein. The importance of follow-up was discussed at the end of the visit and I recommended that the patient be seen by their primary care physician in 2-3 days. The patient verbalized their understanding and agreement with the plan as presented and stated their intention to follow up. Reasons to return to the ER or seek immediate evaluation by a medical provider were discussed at length and all questions were answered to the highest degree of accuracy possible based on the current information available. At this time the patient is stable and safe for discharge. MDM:  Patient presented with concerns for worsening HTN, chest pain and chronic LBP. On arrival, the patient was hypertensive and tachycardic with remaining VS wnl. EKG and physical exam were  as documented above. Labs were remarkable for normal trop, normal proBNP, no leukocytosis, normal lactate and negative Covid test.    CTA chest was negative for PE and other acute cardiopulmonary pathology. Signs of RAD noted.      The patient was given duonebs, steroids and tylenol while in the department and did report siginficant symptomatic improvement. Given the lack of findings necessitating further emergent intervention or hospitalization, the decision was made to discharge the patient with recommendations for follow up and symptomatic care. Reasons to return to the ER were discussed and all questions were answered. The patient was stable at the time of their disposition. New Prescriptions    ALBUTEROL (PROVENTIL) (2.5 MG/3ML) 0.083% NEBULIZER SOLUTION    Take 3 mLs by nebulization every 6 hours as needed for Wheezing or Shortness of Breath    ALBUTEROL SULFATE HFA (VENTOLIN HFA) 108 (90 BASE) MCG/ACT INHALER    Inhale 2 puffs into the lungs 4 times daily as needed for Wheezing or Shortness of Breath    LOSARTAN (COZAAR) 100 MG TABLET    Take 1 tablet by mouth daily    PREDNISONE (DELTASONE) 20 MG TABLET    Take 2 tablets by mouth daily for 5 days       Diagnosis:  1. Bronchitis    2. Exacerbation of intermittent asthma, unspecified asthma severity    3. Chest wall pain    4. Uncontrolled hypertension        Disposition:  Patient's disposition: Discharge to home. Patient's condition is stable. This patient was seen, examined and treated with Kylee Lentz DO. All pertinent aspects of the patient's care were discussed with the attending physician.        Brian Harvey DO  Resident  01/24/22 [id number]

## 2022-01-23 LAB
EKG ATRIAL RATE: 84 BPM
EKG P AXIS: 28 DEGREES
EKG P-R INTERVAL: 132 MS
EKG Q-T INTERVAL: 360 MS
EKG QRS DURATION: 70 MS
EKG QTC CALCULATION (BAZETT): 425 MS
EKG R AXIS: 9 DEGREES
EKG T AXIS: 26 DEGREES
EKG VENTRICULAR RATE: 84 BPM

## 2022-01-23 PROCEDURE — 93010 ELECTROCARDIOGRAM REPORT: CPT | Performed by: INTERNAL MEDICINE

## 2022-07-12 ENCOUNTER — OFFICE VISIT (OUTPATIENT)
Dept: PRIMARY CARE CLINIC | Age: 42
End: 2022-07-12
Payer: COMMERCIAL

## 2022-07-12 ENCOUNTER — HOSPITAL ENCOUNTER (OUTPATIENT)
Age: 42
Setting detail: SPECIMEN
Discharge: HOME | End: 2022-07-12

## 2022-07-12 VITALS
SYSTOLIC BLOOD PRESSURE: 110 MMHG | TEMPERATURE: 97.9 F | WEIGHT: 122.4 LBS | HEIGHT: 60 IN | DIASTOLIC BLOOD PRESSURE: 72 MMHG | HEART RATE: 68 BPM | BODY MASS INDEX: 24.03 KG/M2 | OXYGEN SATURATION: 99 %

## 2022-07-12 DIAGNOSIS — J04.0 LARYNGITIS: ICD-10-CM

## 2022-07-12 DIAGNOSIS — R06.02 SHORTNESS OF BREATH: ICD-10-CM

## 2022-07-12 DIAGNOSIS — J45.901 MODERATE ASTHMA WITH EXACERBATION, UNSPECIFIED WHETHER PERSISTENT: Primary | ICD-10-CM

## 2022-07-12 PROCEDURE — G8420 CALC BMI NORM PARAMETERS: HCPCS | Performed by: PHYSICIAN ASSISTANT

## 2022-07-12 PROCEDURE — 4004F PT TOBACCO SCREEN RCVD TLK: CPT | Performed by: PHYSICIAN ASSISTANT

## 2022-07-12 PROCEDURE — 99214 OFFICE O/P EST MOD 30 MIN: CPT | Performed by: PHYSICIAN ASSISTANT

## 2022-07-12 PROCEDURE — 94640 AIRWAY INHALATION TREATMENT: CPT | Performed by: PHYSICIAN ASSISTANT

## 2022-07-12 PROCEDURE — G8427 DOCREV CUR MEDS BY ELIG CLIN: HCPCS | Performed by: PHYSICIAN ASSISTANT

## 2022-07-12 RX ORDER — ALBUTEROL SULFATE 2.5 MG/3ML
2.5 SOLUTION RESPIRATORY (INHALATION) ONCE
Status: SHIPPED | OUTPATIENT
Start: 2022-07-12

## 2022-07-12 RX ORDER — PREDNISONE 20 MG/1
20 TABLET ORAL 2 TIMES DAILY
Qty: 10 TABLET | Refills: 0 | Status: SHIPPED | OUTPATIENT
Start: 2022-07-12 | End: 2022-07-17

## 2022-07-12 ASSESSMENT — ENCOUNTER SYMPTOMS
DIARRHEA: 0
SHORTNESS OF BREATH: 1
RHINORRHEA: 0
COUGH: 0
VOICE CHANGE: 1
SINUS PRESSURE: 0
VOMITING: 0
EYES NEGATIVE: 1
SORE THROAT: 0
CHEST TIGHTNESS: 1
WHEEZING: 1
SINUS PAIN: 0
FACIAL SWELLING: 0
NAUSEA: 0

## 2022-07-12 ASSESSMENT — PATIENT HEALTH QUESTIONNAIRE - PHQ9
2. FEELING DOWN, DEPRESSED OR HOPELESS: 0
SUM OF ALL RESPONSES TO PHQ QUESTIONS 1-9: 0
SUM OF ALL RESPONSES TO PHQ9 QUESTIONS 1 & 2: 0
SUM OF ALL RESPONSES TO PHQ QUESTIONS 1-9: 0
1. LITTLE INTEREST OR PLEASURE IN DOING THINGS: 0
SUM OF ALL RESPONSES TO PHQ QUESTIONS 1-9: 0
SUM OF ALL RESPONSES TO PHQ QUESTIONS 1-9: 0

## 2022-07-12 NOTE — PROGRESS NOTES
[facility]  [address]  Phone: [phone]  Fax: [phone]    Colette Atwood is a 42 y.o. female who presents today for her medical conditions/complaintsas noted below. Chief Complaint   Patient presents with    Shortness of Breath     sob started today. Patient said she tried to use her inhaler and no relief    Hoarse     loss of voice started today         HPI:     HPI  Started today in shower feeling SOB and broke into a sweat. Now lost her voice. NO fever or aches. Is fatigued. Has Asthma and has not been taking her Advair due to something at the pharmacy. It does look like it requires a PA when trying to reorder so switched to [facility] though she did just receive an Advair.       Current Outpatient Medications   Medication Sig Dispense Refill    predniSONE (DELTASONE) 20 MG tablet Take 1 tablet by mouth 2 times daily for 5 days 10 tablet 0    mometasone-formoterol (DULERA) 100-5 MCG/ACT inhaler Inhale 2 puffs into the lungs 2 times daily 1 each 2    fluticasone-salmeterol (ADVAIR DISKUS) 250-50 MCG/DOSE AEPB Inhale 1 puff into the lungs every 12 hours 60 each 3    albuterol (PROVENTIL) (2.5 MG/3ML) 0.083% nebulizer solution Take 3 mLs by nebulization every 4 hours as needed for Wheezing or Shortness of Breath 120 vial 0    albuterol sulfate HFA (VENTOLIN HFA) 108 (90 Base) MCG/ACT inhaler Inhale 2 puffs into the lungs 4 times daily as needed for Wheezing 1 Inhaler 2     Current Facility-Administered Medications   Medication Dose Route Frequency Provider Last Rate Last Admin    albuterol (PROVENTIL) nebulizer solution 2.5 mg  2.5 mg Nebulization Once Alli Rolling, PA-C         Allergies   Allergen Reactions    Tramadol Other (See Comments) and Hives     Feels intoxicated for a long time  Keeps her up  Feels intoxicated for a long time    Ciprofloxacin     Vicodin [Hydrocodone-Acetaminophen] Other (See Comments)     Feels like bugs crawling all over    Adhesive Tape Rash     OK to use paper tape per patient    Percocet [Oxycodone-Acetaminophen] Nausea And Vomiting       Subjective:      Review of Systems   Constitutional: Positive for diaphoresis and fatigue. Negative for chills and fever. HENT: Positive for voice change. Negative for congestion, ear discharge, ear pain, facial swelling, postnasal drip, rhinorrhea, sinus pressure, sinus pain, sneezing and sore throat. Eyes: Negative. Respiratory: Positive for chest tightness, shortness of breath and wheezing. Negative for cough. Cardiovascular: Negative for chest pain. Gastrointestinal: Negative for diarrhea, nausea and vomiting. Endocrine: Negative. Musculoskeletal: Negative for arthralgias and myalgias. Neurological: Positive for dizziness. Negative for headaches. Objective:     /72   Pulse 68   Temp 97.9 °F (36.6 °C)   Ht 5' (1.524 m)   Wt 122 lb 6.4 oz (55.5 kg)   SpO2 99%   BMI 23.90 kg/m²   Physical Exam  Vitals and nursing note reviewed. Constitutional:       Appearance: Normal appearance. She is not ill-appearing. Comments: Mild respiratory distress with talking   HENT:      Right Ear: Tympanic membrane, ear canal and external ear normal.      Left Ear: Tympanic membrane, ear canal and external ear normal.      Nose: Nose normal.      Mouth/Throat:      Mouth: Mucous membranes are moist.      Pharynx: Oropharynx is clear. Posterior oropharyngeal erythema present. No oropharyngeal exudate. Tonsils: No tonsillar exudate or tonsillar abscesses. Comments: laryngitis  Eyes:      General:         Right eye: No discharge. Left eye: No discharge. Conjunctiva/sclera: Conjunctivae normal.      Pupils: Pupils are equal, round, and reactive to light. Cardiovascular:      Rate and Rhythm: Normal rate and regular rhythm. Heart sounds: Normal heart sounds.    Pulmonary:      Effort: Pulmonary effort is normal. Breath sounds: Decreased air movement present. Wheezing present. No rhonchi or rales. Comments: Thruout visit especially after Covid swab she progressively became more SOB. Her 02 sat was 100 but did drop to 90 even after albuterol nebulizer was given. At that point I started 4L of 02. I did not have any Ativan to help calm her as she was very anxious so I did give her oral Benedryl 25mg. She continued to struggle to breath so EMS was called and she was taken to [facility].   Abdominal:      General: Abdomen is flat. Bowel sounds are normal. There is no distension. Tenderness: There is no abdominal tenderness. Neurological:      Mental Status: She is alert. Assessment:       Diagnosis Orders   1. Moderate asthma with exacerbation, unspecified whether persistent     2. Shortness of breath  COVID-19    [place] PRESSURIZED/NONPRESSURIZED INHALATION TREATMENT    albuterol (PROVENTIL) nebulizer solution 2.5 mg   3. Laryngitis          Plan:    Use albuterol nebs 4 times a day   Prednisone burst  Changed Advair to [facility] and use regularly  Did look over the PFT and go over it with patient. Work slip for today and tomorrow   See comments above concerning progressive respiratory distress. Daughter was called to meet her at office at first and then redirected to hospital.     Return if symptoms worsen or fail to improve, for and 1 month to reaccess [facility] use. Orders Placed This Encounter   Procedures    COVID-19     Standing Status:   Future     Standing Expiration Date:   7/12/2023     Scheduling Instructions:      1) Due to current limited availability of the COVID-19 test, tests will be prioritized based on responses to questions above. Testing may be delayed due to volume.             2) Print and instruct patient to adhere to CDC home isolation program. (Link Above)              3) Set up or refer patient for a monitoring program.              4) Have patient sign up for and johana Lay (if not previously done). Order Specific Question:   Is this test for diagnosis or screening? Answer:   Diagnosis of ill patient     Order Specific Question:   Symptomatic for COVID-19 as defined by CDC? Answer:   Yes     Order Specific Question:   Date of Symptom Onset     Answer:   7/12/2022     Order Specific Question:   Hospitalized for COVID-19? Answer:   No     Order Specific Question:   Admitted to ICU for COVID-19? Answer:   No     Order Specific Question:   Employed in healthcare setting? Answer:   No     Order Specific Question:   Resident in a congregate (group) care setting? Answer:   No     Order Specific Question:   Pregnant? Answer:   No     Order Specific Question:   Previously tested for COVID-19? Answer:    Yes    [place] PRESSURIZED/NONPRESSURIZED INHALATION TREATMENT     Orders Placed This Encounter   Medications    predniSONE (DELTASONE) 20 MG tablet     Sig: Take 1 tablet by mouth 2 times daily for 5 days     Dispense:  10 tablet     Refill:  0    mometasone-formoterol (DULERA) 100-5 MCG/ACT inhaler     Sig: Inhale 2 puffs into the lungs 2 times daily     Dispense:  1 each     Refill:  2    albuterol (PROVENTIL) nebulizer solution 2.5 mg     Order Specific Question:   Initiate RT Bronchodilator Protocol     Answer:   No           Electronically signed by Mitzy Morales 7/12/2022 at 10:27 PM

## 2022-07-13 ENCOUNTER — TELEPHONE (OUTPATIENT)
Dept: PRIMARY CARE CLINIC | Age: 42
End: 2022-07-13

## 2022-07-13 DIAGNOSIS — R06.02 SHORTNESS OF BREATH: ICD-10-CM

## 2022-07-13 LAB
SARS-COV-2: NORMAL
SARS-COV-2: NOT DETECTED
SOURCE: NORMAL

## 2022-08-08 ENCOUNTER — TELEPHONE (OUTPATIENT)
Dept: PRIMARY CARE CLINIC | Age: 42
End: 2022-08-08

## 2022-08-08 NOTE — TELEPHONE ENCOUNTER
Patient asking for a doctors note explaining that her asthma is uncontrollable and cannot control when it happen to be able to do her job fully. 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 Okay for note ?

## 2022-08-18 ENCOUNTER — OFFICE VISIT (OUTPATIENT)
Dept: PRIMARY CARE CLINIC | Age: 42
End: 2022-08-18
Payer: COMMERCIAL

## 2022-08-18 VITALS
BODY MASS INDEX: 23.68 KG/M2 | OXYGEN SATURATION: 97 % | DIASTOLIC BLOOD PRESSURE: 72 MMHG | WEIGHT: 120.6 LBS | SYSTOLIC BLOOD PRESSURE: 118 MMHG | HEART RATE: 71 BPM | HEIGHT: 60 IN

## 2022-08-18 DIAGNOSIS — F17.210 CIGARETTE NICOTINE DEPENDENCE WITHOUT COMPLICATION: ICD-10-CM

## 2022-08-18 DIAGNOSIS — J45.40 MODERATE PERSISTENT ASTHMA WITHOUT COMPLICATION: Primary | ICD-10-CM

## 2022-08-18 PROCEDURE — G8427 DOCREV CUR MEDS BY ELIG CLIN: HCPCS | Performed by: PHYSICIAN ASSISTANT

## 2022-08-18 PROCEDURE — G8420 CALC BMI NORM PARAMETERS: HCPCS | Performed by: PHYSICIAN ASSISTANT

## 2022-08-18 PROCEDURE — 99213 OFFICE O/P EST LOW 20 MIN: CPT | Performed by: PHYSICIAN ASSISTANT

## 2022-08-18 PROCEDURE — 4004F PT TOBACCO SCREEN RCVD TLK: CPT | Performed by: PHYSICIAN ASSISTANT

## 2022-08-18 RX ORDER — POLYETHYLENE GLYCOL 3350 17 G
4 POWDER IN PACKET (EA) ORAL PRN
Qty: 100 EACH | Refills: 3 | Status: SHIPPED | OUTPATIENT
Start: 2022-08-18 | End: 2022-11-01 | Stop reason: ALTCHOICE

## 2022-08-18 RX ORDER — ALBUTEROL SULFATE 90 UG/1
2 AEROSOL, METERED RESPIRATORY (INHALATION) 4 TIMES DAILY PRN
Qty: 1 EACH | Refills: 5 | Status: SHIPPED | OUTPATIENT
Start: 2022-08-18

## 2022-08-18 SDOH — ECONOMIC STABILITY: FOOD INSECURITY: WITHIN THE PAST 12 MONTHS, THE FOOD YOU BOUGHT JUST DIDN'T LAST AND YOU DIDN'T HAVE MONEY TO GET MORE.: NEVER TRUE

## 2022-08-18 SDOH — ECONOMIC STABILITY: FOOD INSECURITY: WITHIN THE PAST 12 MONTHS, YOU WORRIED THAT YOUR FOOD WOULD RUN OUT BEFORE YOU GOT MONEY TO BUY MORE.: NEVER TRUE

## 2022-08-18 ASSESSMENT — PATIENT HEALTH QUESTIONNAIRE - PHQ9
SUM OF ALL RESPONSES TO PHQ9 QUESTIONS 1 & 2: 0
SUM OF ALL RESPONSES TO PHQ QUESTIONS 1-9: 0
1. LITTLE INTEREST OR PLEASURE IN DOING THINGS: 0
SUM OF ALL RESPONSES TO PHQ QUESTIONS 1-9: 0
2. FEELING DOWN, DEPRESSED OR HOPELESS: 0

## 2022-08-18 ASSESSMENT — ENCOUNTER SYMPTOMS
SHORTNESS OF BREATH: 1
WHEEZING: 1

## 2022-08-18 ASSESSMENT — SOCIAL DETERMINANTS OF HEALTH (SDOH): HOW HARD IS IT FOR YOU TO PAY FOR THE VERY BASICS LIKE FOOD, HOUSING, MEDICAL CARE, AND HEATING?: NOT HARD AT ALL

## 2022-08-18 NOTE — PROGRESS NOTES
[facility]  [address] [place] [id number]  Dept: [phone]    Demarcus Benedict is a 42 y.o. female who presents today for her medical conditions/complaints as noted below. Chief Complaint   Patient presents with    Asthma     Patient states she has troubles breathing everyday    Other     work       HPI:     HPI  Dulera 2 puffs every day in am   Using her rescue inhaler often  Has difficulty in hot, humid, very cold weather  ER diagnosed her with Asthma attack. Still smoking but has cut way down.     No results found for: LDLCHOLESTEROL, LDLCALC    (goal LDL is <100)   No results found for: AST, ALT, BUN, CR  BP Readings from Last 3 Encounters:   08/18/22 118/72   07/12/22 110/72   11/22/21 98/68          (goal 120/80)    Past Medical History:   Diagnosis Date    Abdominal pain     feels crampy/colicky    Abnormal CT of the abdomen     Acute upper respiratory infection     Arthritis     OA in lower back     Coccyx contusion     Fractured coccyx ([address]     H/O acute pharyngitis     H/O acute sinusitis     H/O contact dermatitis     H/O nausea and vomiting     H/O pyelonephritis     Laryngitis 08/2021    hopitalized in [facility] for 4-5 days for trouble breathing, intubated for 4 hours    Lump of skin     Sciatica     right side    SOB (shortness of breath) on exertion 08/24/2021    UTI (urinary tract infection)     frequent      Past Surgical History:   Procedure Laterality Date    COLONOSCOPY N/A 8/27/2021    COLONOSCOPY DIAGNOSTIC performed by Ashley Zepeda MD at [address] [place] [address]      with endometrial ablation    OTHER SURGICAL HISTORY Right 12/19/2016    right sided lumbar facet steroid injection    OTHER SURGICAL HISTORY  01/12/2017    lumbar medial nerve block L2-L5    OTHER SURGICAL HISTORY  05/2000    D & C    PARTIAL HYSTERECTOMY (CERVIX NOT REMOVED)      TUBAL LIGATION         Family History   Problem Relation Age of Onset    Heart Disease Mother     High Blood Pressure Mother     High Cholesterol Mother     Arthritis Father     Other Maternal Grandmother         Mantel cell lymphoma    Other Paternal Aunt         oral cancer       Social History     Tobacco Use    Smoking status: Every Day     Packs/day: 0.50     Years: 32.00     Pack years: 16.00     Types: Cigarettes    Smokeless tobacco: Never   Substance Use Topics    Alcohol use: Not Currently     Alcohol/week: 0.0 standard drinks      Current Outpatient Medications   Medication Sig Dispense Refill    mometasone-formoterol (DULERA) 100-5 MCG/ACT inhaler Inhale 2 puffs into the lungs 2 times daily 1 each 5    albuterol sulfate HFA (VENTOLIN HFA) 108 (90 Base) MCG/ACT inhaler Inhale 2 puffs into the lungs 4 times daily as needed for Wheezing 1 each 5    nicotine polacrilex (NICOTINE MINI) 4 MG lozenge Take 1 lozenge by mouth as needed for Smoking cessation 100 each 3    albuterol (PROVENTIL) (2.5 MG/3ML) 0.083% nebulizer solution Take 3 mLs by nebulization every 4 hours as needed for Wheezing or Shortness of Breath 120 vial 0     Current Facility-Administered Medications   Medication Dose Route Frequency Provider Last Rate Last Admin    albuterol (PROVENTIL) nebulizer solution 2.5 mg  2.5 mg Nebulization Once Guadelupe Motts, PA-C         Allergies   Allergen Reactions    Tramadol Other (See Comments) and Hives     Feels intoxicated for a long time  Keeps her up  Feels intoxicated for a long time    Ciprofloxacin     Vicodin [Hydrocodone-Acetaminophen] Other (See Comments)     Feels like bugs crawling all over    Adhesive Tape Rash     OK to use paper tape per patient    Percocet [Oxycodone-Acetaminophen] Nausea And Vomiting       Health Maintenance   Topic Date Due    COVID-19 Vaccine (1) Never done    HIV screen  Never done    Hepatitis C screen  Never done    Lipids  Never done    Pneumococcal 0-64 years Vaccine (2 - PCV) 10/18/2020    Flu vaccine (1) 09/01/2022 Depression Screen  07/12/2023    DTaP/Tdap/Td vaccine (3 - Td or Tdap) 04/19/2027    Hepatitis A vaccine  Aged Out    Hepatitis B vaccine  Aged Out    Hib vaccine  Aged Out    Meningococcal (ACWY) vaccine  Aged Out       Subjective:      Review of Systems   Constitutional:  Negative for chills, diaphoresis and fever. HENT: Negative. Respiratory:  Positive for shortness of breath and wheezing. Cardiovascular:  Negative for chest pain, palpitations and leg swelling. Hematological:  Negative for adenopathy. Objective:     /72   Pulse 71   Ht 5' (1.524 m)   Wt 120 lb 9.6 oz (54.7 kg)   SpO2 97%   BMI 23.55 kg/m²   Physical Exam  Vitals and nursing note reviewed. Constitutional:       Appearance: Normal appearance. Cardiovascular:      Rate and Rhythm: Normal rate and regular rhythm. Heart sounds: Normal heart sounds. Pulmonary:      Effort: Pulmonary effort is normal.      Breath sounds: Normal breath sounds. Neurological:      Mental Status: She is alert and oriented to person, place, and time. Psychiatric:         Mood and Affect: Mood normal.       Assessment:       Diagnosis Orders   1. Moderate persistent asthma without complication  mometasone-formoterol (DULERA) 100-5 MCG/ACT inhaler    albuterol sulfate HFA (VENTOLIN HFA) 108 (90 Base) MCG/ACT inhaler    [facility], [place], DO, Pulmonology, [place]      2. Cigarette nicotine dependence without complication  nicotine polacrilex (NICOTINE MINI) 4 MG lozenge           Plan:    Referral to pulmo  Take Dulera 2 puffs bid  Sent refills  Work on quitting smoking. Given nicotine lozenges  Needs letter stating may have asthma attacks in sever cold, hot or humid weather. Return in about 2 months (around 10/18/2022) for recheck Asthma.     Orders Placed This Encounter   Procedures    Fanny Mcgrath DO, Pulmonology, [place]     Referral Priority:   Routine     Referral Type:   Eval and Treat     Referral Reason: Specialty Services Required     Referred to Provider:   Sonia Menendez DO     Requested Specialty:   Pulmonology     Number of Visits Requested:   1       Orders Placed This Encounter   Medications    mometasone-formoterol (DULERA) 100-5 MCG/ACT inhaler     Sig: Inhale 2 puffs into the lungs 2 times daily     Dispense:  1 each     Refill:  5    albuterol sulfate HFA (VENTOLIN HFA) 108 (90 Base) MCG/ACT inhaler     Sig: Inhale 2 puffs into the lungs 4 times daily as needed for Wheezing     Dispense:  1 each     Refill:  5    nicotine polacrilex (NICOTINE MINI) 4 MG lozenge     Sig: Take 1 lozenge by mouth as needed for Smoking cessation     Dispense:  100 each     Refill:  3         Patient given educational materials - see patient instructions. Discussed use, benefit, and side effects of prescribed medications. All patient questions answered. Pt voiced understanding. Reviewed health maintenance. Instructed to continue current medications, diet and exercise. Patient agreed with treatment plan. Follow up as directed.      Electronically signed by Gabriela Ren PA-C on 8/18/2022 at 1:27 PM

## 2022-09-12 ENCOUNTER — HOSPITAL ENCOUNTER (OUTPATIENT)
Dept: ULTRASOUND IMAGING | Age: 42
Discharge: HOME | End: 2022-09-14
Payer: COMMERCIAL

## 2022-09-12 DIAGNOSIS — E06.3 AUTOIMMUNE THYROIDITIS: ICD-10-CM

## 2022-09-12 PROCEDURE — 76536 US EXAM OF HEAD AND NECK: CPT

## 2022-09-26 ENCOUNTER — HOSPITAL ENCOUNTER (OUTPATIENT)
Dept: MAMMOGRAPHY | Age: 42
Discharge: HOME | End: 2022-09-28
Payer: COMMERCIAL

## 2022-09-26 ENCOUNTER — HOSPITAL ENCOUNTER (OUTPATIENT)
Age: 42
Discharge: HOME | End: 2022-09-28
Payer: COMMERCIAL

## 2022-09-26 DIAGNOSIS — Z12.31 BREAST CANCER SCREENING BY MAMMOGRAM: ICD-10-CM

## 2022-09-26 PROCEDURE — 77063 BREAST TOMOSYNTHESIS BI: CPT

## 2022-09-27 LAB
ALBUMIN SERPL-MCNC: NORMAL G/DL
ALP BLD-CCNC: NORMAL U/L
ALT SERPL-CCNC: NORMAL U/L
ANION GAP SERPL CALCULATED.3IONS-SCNC: NORMAL MMOL/L
AST SERPL-CCNC: NORMAL U/L
AVERAGE GLUCOSE: NORMAL
BASOPHILS ABSOLUTE: NORMAL
BASOPHILS RELATIVE PERCENT: NORMAL
BILIRUB SERPL-MCNC: NORMAL MG/DL
BUN BLDV-MCNC: 11 MG/DL
CALCIUM SERPL-MCNC: NORMAL MG/DL
CHLORIDE BLD-SCNC: NORMAL MMOL/L
CHOLESTEROL, TOTAL: 166 MG/DL
CHOLESTEROL/HDL RATIO: NORMAL
CO2: NORMAL
CREAT SERPL-MCNC: 0.78 MG/DL
EGFR: NORMAL
EOSINOPHILS ABSOLUTE: NORMAL
EOSINOPHILS RELATIVE PERCENT: NORMAL
GLUCOSE BLD-MCNC: 84 MG/DL
HBA1C MFR BLD: 5.5 %
HCT VFR BLD CALC: NORMAL %
HDLC SERPL-MCNC: 51 MG/DL (ref 35–70)
HEMOGLOBIN: NORMAL
LDL CHOLESTEROL CALCULATED: 82 MG/DL (ref 0–160)
LYMPHOCYTES ABSOLUTE: NORMAL
LYMPHOCYTES RELATIVE PERCENT: NORMAL
MCH RBC QN AUTO: NORMAL PG
MCHC RBC AUTO-ENTMCNC: NORMAL G/DL
MCV RBC AUTO: NORMAL FL
MONOCYTES ABSOLUTE: NORMAL
MONOCYTES RELATIVE PERCENT: NORMAL
NEUTROPHILS ABSOLUTE: NORMAL
NEUTROPHILS RELATIVE PERCENT: NORMAL
NONHDLC SERPL-MCNC: NORMAL MG/DL
PLATELET # BLD: NORMAL 10*3/UL
PMV BLD AUTO: NORMAL FL
POTASSIUM SERPL-SCNC: 4.7 MMOL/L
RBC # BLD: NORMAL 10*6/UL
SODIUM BLD-SCNC: NORMAL MMOL/L
TOTAL PROTEIN: NORMAL
TRIGL SERPL-MCNC: 166 MG/DL
VITAMIN B-12: NORMAL
VITAMIN D 25-HYDROXY: NORMAL
VITAMIN D2, 25 HYDROXY: NORMAL
VITAMIN D3,25 HYDROXY: NORMAL
VLDLC SERPL CALC-MCNC: NORMAL MG/DL
WBC # BLD: NORMAL 10*3/UL

## 2022-11-01 ENCOUNTER — HOSPITAL ENCOUNTER (OUTPATIENT)
Age: 42
Setting detail: SPECIMEN
Discharge: HOME | End: 2022-11-01

## 2022-11-01 ENCOUNTER — OFFICE VISIT (OUTPATIENT)
Dept: PRIMARY CARE CLINIC | Age: 42
End: 2022-11-01
Payer: COMMERCIAL

## 2022-11-01 VITALS
WEIGHT: 120.2 LBS | SYSTOLIC BLOOD PRESSURE: 120 MMHG | BODY MASS INDEX: 23.6 KG/M2 | TEMPERATURE: 97.5 F | DIASTOLIC BLOOD PRESSURE: 72 MMHG | OXYGEN SATURATION: 100 % | HEIGHT: 60 IN | HEART RATE: 79 BPM

## 2022-11-01 DIAGNOSIS — R53.83 OTHER FATIGUE: Primary | ICD-10-CM

## 2022-11-01 DIAGNOSIS — R06.89 DECREASED BREATH SOUNDS AT RIGHT LUNG BASE: ICD-10-CM

## 2022-11-01 DIAGNOSIS — J45.41 MODERATE PERSISTENT ASTHMA WITH ACUTE EXACERBATION: ICD-10-CM

## 2022-11-01 DIAGNOSIS — R63.0 LOSS OF APPETITE: ICD-10-CM

## 2022-11-01 LAB
INFLUENZA A ANTIBODY: NEGATIVE
INFLUENZA B ANTIBODY: NEGATIVE

## 2022-11-01 PROCEDURE — G8420 CALC BMI NORM PARAMETERS: HCPCS | Performed by: PHYSICIAN ASSISTANT

## 2022-11-01 PROCEDURE — 99214 OFFICE O/P EST MOD 30 MIN: CPT | Performed by: PHYSICIAN ASSISTANT

## 2022-11-01 PROCEDURE — 4004F PT TOBACCO SCREEN RCVD TLK: CPT | Performed by: PHYSICIAN ASSISTANT

## 2022-11-01 PROCEDURE — 87804 INFLUENZA ASSAY W/OPTIC: CPT | Performed by: PHYSICIAN ASSISTANT

## 2022-11-01 PROCEDURE — G8484 FLU IMMUNIZE NO ADMIN: HCPCS | Performed by: PHYSICIAN ASSISTANT

## 2022-11-01 PROCEDURE — G8427 DOCREV CUR MEDS BY ELIG CLIN: HCPCS | Performed by: PHYSICIAN ASSISTANT

## 2022-11-01 ASSESSMENT — ENCOUNTER SYMPTOMS
SHORTNESS OF BREATH: 1
RHINORRHEA: 0
SORE THROAT: 0
CHEST TIGHTNESS: 0
SINUS PRESSURE: 0
WHEEZING: 0
SINUS PAIN: 0
VOICE CHANGE: 1
COUGH: 0

## 2022-11-01 NOTE — PROGRESS NOTES
Constitutional:  Positive for appetite change and fatigue (last 2 days). Negative for chills, diaphoresis and fever. HENT:  Positive for voice change. Negative for congestion, rhinorrhea, sinus pressure, sinus pain and sore throat. Respiratory:  Positive for shortness of breath. Negative for cough, chest tightness and wheezing. Cardiovascular:  Negative for chest pain. Neurological:  Negative for seizures. Psychiatric/Behavioral:  The patient is nervous/anxious. Objective:     /72   Pulse 79   Ht 5' (1.524 m)   Wt 120 lb 3.2 oz (54.5 kg)   SpO2 100%   BMI 23.47 kg/m²   Physical Exam  Vitals and nursing note reviewed. Constitutional:       Appearance: Normal appearance. She is not ill-appearing. HENT:      Right Ear: Tympanic membrane, ear canal and external ear normal.      Left Ear: Tympanic membrane, ear canal and external ear normal.      Nose: Nose normal.      Mouth/Throat:      Mouth: Mucous membranes are moist.      Pharynx: No posterior oropharyngeal erythema. Eyes:      General:         Right eye: No discharge. Left eye: No discharge. Conjunctiva/sclera: Conjunctivae normal.      Pupils: Pupils are equal, round, and reactive to light. Cardiovascular:      Rate and Rhythm: Normal rate and regular rhythm. Heart sounds: Normal heart sounds. Pulmonary:      Effort: Pulmonary effort is normal.      Breath sounds: Decreased air movement (on right upper and lower) present. Examination of the right-upper field reveals decreased breath sounds. Examination of the right-middle field reveals decreased breath sounds. Examination of the right-lower field reveals decreased breath sounds. Decreased breath sounds present. No wheezing, rhonchi or rales. Skin:     Findings: No rash. Neurological:      Mental Status: She is alert and oriented to person, place, and time.    Psychiatric:         Mood and Affect: Mood normal.       Assessment:       Diagnosis Orders 1. Other fatigue  XR CHEST (2 VW)    POCT Influenza A/B    COVID-19      2. Loss of appetite  XR CHEST (2 VW)    POCT Influenza A/B    COVID-19      3. Moderate persistent asthma with acute exacerbation  POCT Influenza A/B    COVID-19      4. Decreased breath sounds at right lung base  XR CHEST (2 VW)           Document temperature  Covid and Flu swab  Take Dulera 2 puffs bid now and consistently. Should have always been bid  May also try this as a rescue by new guidelines  Run a [facility]   CXR    Return for F/U with pulmo, Will call with results. Orders Placed This Encounter   Procedures    XR CHEST (2 VW)     Standing Status:   Future     Standing Expiration Date:   11/1/2023    COVID-19     Standing Status:   Future     Standing Expiration Date:   11/1/2023     Scheduling Instructions:      1) Due to current limited availability of the COVID-19 test, tests will be prioritized based on responses to questions above. Testing may be delayed due to volume. 2) Print and instruct patient to adhere to CDC home isolation program. (Link Above)              3) Set up or refer patient for a monitoring program.              4) Have patient sign up for and leverage MyChart (if not previously done). Order Specific Question:   Is this test for diagnosis or screening? Answer:   Diagnosis of ill patient     Order Specific Question:   Symptomatic for COVID-19 as defined by CDC? Answer:   Yes     Order Specific Question:   Date of Symptom Onset     Answer:   10/29/2022     Order Specific Question:   Hospitalized for COVID-19? Answer:   No     Order Specific Question:   Admitted to ICU for COVID-19? Answer:   No     Order Specific Question:   Employed in healthcare setting? Answer:   No     Order Specific Question:   Resident in a congregate (group) care setting? Answer:   No     Order Specific Question:   Pregnant?      Answer:   No     Order Specific Question:   Previously tested for COVID-19? Answer:   No    POCT Influenza A/B       No orders of the defined types were placed in this encounter.           Electronically signed by Mitzy Mcmahan 11/1/2022 at 5:11 PM Cell Phone/PDA (specify)/Clothing

## 2022-11-02 DIAGNOSIS — R06.89 DECREASED BREATH SOUNDS AT RIGHT LUNG BASE: ICD-10-CM

## 2022-11-02 DIAGNOSIS — R53.83 OTHER FATIGUE: ICD-10-CM

## 2022-11-02 DIAGNOSIS — R63.0 LOSS OF APPETITE: ICD-10-CM

## 2022-11-02 DIAGNOSIS — J45.41 MODERATE PERSISTENT ASTHMA WITH ACUTE EXACERBATION: ICD-10-CM

## 2022-11-02 LAB
SARS-COV-2: NORMAL
SARS-COV-2: NOT DETECTED
SOURCE: NORMAL

## 2022-11-22 ENCOUNTER — OFFICE VISIT (OUTPATIENT)
Dept: PULMONOLOGY | Age: 42
End: 2022-11-22
Payer: COMMERCIAL

## 2022-11-22 VITALS
DIASTOLIC BLOOD PRESSURE: 65 MMHG | RESPIRATION RATE: 16 BRPM | SYSTOLIC BLOOD PRESSURE: 99 MMHG | HEART RATE: 80 BPM | BODY MASS INDEX: 24.94 KG/M2 | HEIGHT: 60 IN | OXYGEN SATURATION: 99 % | WEIGHT: 127 LBS

## 2022-11-22 DIAGNOSIS — J45.40 MODERATE PERSISTENT ASTHMA WITHOUT COMPLICATION: Primary | ICD-10-CM

## 2022-11-22 DIAGNOSIS — F17.200 SMOKING: ICD-10-CM

## 2022-11-22 PROCEDURE — G8427 DOCREV CUR MEDS BY ELIG CLIN: HCPCS | Performed by: INTERNAL MEDICINE

## 2022-11-22 PROCEDURE — G8484 FLU IMMUNIZE NO ADMIN: HCPCS | Performed by: INTERNAL MEDICINE

## 2022-11-22 PROCEDURE — S9441 ASTHMA EDUCATION: HCPCS | Performed by: INTERNAL MEDICINE

## 2022-11-22 PROCEDURE — 4004F PT TOBACCO SCREEN RCVD TLK: CPT | Performed by: INTERNAL MEDICINE

## 2022-11-22 PROCEDURE — 99204 OFFICE O/P NEW MOD 45 MIN: CPT | Performed by: INTERNAL MEDICINE

## 2022-11-22 PROCEDURE — G8420 CALC BMI NORM PARAMETERS: HCPCS | Performed by: INTERNAL MEDICINE

## 2022-11-22 RX ORDER — MONTELUKAST SODIUM 10 MG/1
10 TABLET ORAL NIGHTLY
Qty: 90 TABLET | Refills: 1 | Status: SHIPPED | OUTPATIENT
Start: 2022-11-22

## 2022-11-22 RX ORDER — PREDNISONE 10 MG/1
TABLET ORAL
Qty: 30 TABLET | Refills: 1 | Status: SHIPPED | OUTPATIENT
Start: 2022-11-22

## 2022-11-22 NOTE — PROGRESS NOTES
[facility]  [facility] [id number]  [place] [address]  Dept: [phone]  Dept Fax: [phone]      11/22/22    Patient: Shar Billing  YOB: 1980    Dear Elizabeth Funes PA-C,    I had the pleasure of seeing one of your patients, Milton Au today in the office today. Please find attached my note with the assessment and plan of care. Thank you for allowing me to participate in the care of this patient. I will keep you updated on this patient's follow up and I look forward to serving you and your patients again in the future.     Zaid Bowers MD  11/22/2022 2:05 PM

## 2022-11-22 NOTE — PROGRESS NOTES
PULMONARY  CONSULTATION        REFERRED BY: Tracy Tenorio PA-C    REASON FOR CONSULTATION: Asthma    HISTORY OF PRESENT ILLNESS:    Calin Morton is a 42y.o. year old female here for evaluation of above problem  In August 2021 patient had acid reflux versus laryngitis and subsequently started having shortness of breath and was evaluated in [facility] and apparently required intubation for about 4 to 5 hours.   Continues to have shortness of breath with wheezing and requiring nebulizer twice a week  Her symptoms get worse with exposure to cold air, hot and humid weather  Potential issues albuterol inhaler 3-4 times a week  Has a dry cough intermittently  No fever chills or night sweats  No orthopnea, PND or increasing pedal edema  No chest pain or pressure  No hemoptysis  No exposure to tuberculosis  No occupational exposures or hobbies or pets that would cause lung disease      PAST MEDICAL HISTORY:       Diagnosis Date    Abdominal pain     feels crampy/colicky    Abnormal CT of the abdomen     Acute upper respiratory infection     Arthritis     OA in lower back     Asthma     Coccyx contusion     Fractured coccyx ([address]     H/O acute pharyngitis     H/O acute sinusitis     H/O contact dermatitis     H/O nausea and vomiting     H/O pyelonephritis     Laryngitis 08/2021    hopitalized in [facility] for 4-5 days for trouble breathing, intubated for 4 hours    Lump of skin     Sciatica     right side    SOB (shortness of breath) on exertion 08/24/2021    UTI (urinary tract infection)     frequent       SURGICAL HISTORY:    Past Surgical History:   Procedure Laterality Date    COLONOSCOPY N/A 8/27/2021    COLONOSCOPY DIAGNOSTIC performed by Nolan Ramos MD at [address] [place] [address]      with endometrial ablation    OTHER SURGICAL HISTORY Right 12/19/2016    right sided lumbar facet steroid injection    OTHER SURGICAL HISTORY  01/12/2017    lumbar medial nerve block L2-L5 OTHER SURGICAL HISTORY  05/2000    D & C    PARTIAL HYSTERECTOMY (CERVIX NOT REMOVED)      TUBAL LIGATION         ALLERGIES:    Allergies   Allergen Reactions    Tramadol Other (See Comments) and Hives     Feels intoxicated for a long time  Keeps her up  Feels intoxicated for a long time    Ciprofloxacin     Vicodin [Hydrocodone-Acetaminophen] Other (See Comments)     Feels like bugs crawling all over    Adhesive Tape Rash     OK to use paper tape per patient    Percocet [Oxycodone-Acetaminophen] Nausea And Vomiting       MEDICATIONS:   Current Outpatient Medications   Medication Sig Dispense Refill    mometasone-formoterol (DULERA) 100-5 MCG/ACT inhaler Inhale 2 puffs into the lungs 2 times daily 1 each 5    albuterol sulfate HFA (VENTOLIN HFA) 108 (90 Base) MCG/ACT inhaler Inhale 2 puffs into the lungs 4 times daily as needed for Wheezing 1 each 5    albuterol (PROVENTIL) (2.5 MG/3ML) 0.083% nebulizer solution Take 3 mLs by nebulization every 4 hours as needed for Wheezing or Shortness of Breath 120 vial 0     Current Facility-Administered Medications   Medication Dose Route Frequency Provider Last Rate Last Admin    albuterol (PROVENTIL) nebulizer solution 2.5 mg  2.5 mg Nebulization Once Grace Coronado PA-C           FAMILY HISTORY: family history includes Arthritis in her father; Heart Disease in her mother; High Blood Pressure in her mother; High Cholesterol in her mother; Other in her maternal grandmother and paternal aunt. SOCIAL AND OCCUPATIONAL HEALTH:  The patient is a Current smoker of 16 pack years. There  is not history of TB or TB exposure. There is not asbestos or silica dust exposure. The patient reports does not have coal, foundry, quarry or [employer] exposure. Travel history reveals no significant history of risk factors for pulm disease. There is not  history of recreational or IV drug use. The patient does not have pets, dogs, cats turtles or exotic birds.         Review of Systems:  Review of Systems -   General ROS: Completed and except as mentioned above were negative   Psychological ROS:  Completed and except as mentioned above were negative  Ophthalmic ROS:  Completed and except as mentioned above were negative  ENT ROS:  Completed and except as mentioned above were negative  Allergy and Immunology ROS:  Completed and except as mentioned above were negative  Hematological and Lymphatic ROS:  Completed and except as mentioned above were negative  Endocrine ROS: Completed and except as mentioned above were negative  Breast ROS:  Completed and except as mentioned above were negative  Respiratory ROS:  Completed and except as mentioned above were negative  Cardiovascular ROS:  Completed and except as mentioned above were negative  Gastrointestinal ROS: Completed and except as mentioned above were negative  Genito-Urinary ROS:  Completed and except as mentioned above were negative  Musculoskeletal ROS:  Completed and except as mentioned above were negative  Neurological ROS:  Completed and except as mentioned above were negative  Dermatological ROS:  Completed and except as mentioned above were negative    SLEEP  No epistaxis sor sore throat. does not have fatigue, sleepiness ortiredness in am.   No MVA. No edema. PHYSICAL EXAMINATION:  Vitals:    11/22/22 1402   BP: 99/65   Site: Left Lower Arm   Position: Sitting   Cuff Size: Small Adult   Pulse: 80   Resp: 16   SpO2: 99%   Weight: 127 lb (57.6 kg)   Height: 5' (1.524 m)     PHYSICAL EXAMINATION:  Vitals:    11/22/22 1402   BP: 99/65   Site: Left Lower Arm   Position: Sitting   Cuff Size: Small Adult   Pulse: 80   Resp: 16   SpO2: 99%   Weight: 127 lb (57.6 kg)   Height: 5' (1.524 m)     Constitutional: This is a well developed, well nourished, 18.5-24.9 - Normal 42y.o. year old female who is alert, oriented, cooperative and in no apparent distress. Head:normocephalic and atraumatic. EENT:  RENETTA.   No conjunctival injections. Septum was midline, mucosa was without erythema, exudates or cobblestoning. No thrush was noted. MallampatiII (soft palate, uvula, fauces visible)  Neck: Supple without thyromegaly. No elevated JVP. Trachea was midline. Respiratory: Chest was symmetrical without dullness to percussion. Breath sounds bilaterally were clear to auscultation. There were no wheezes, rhonchi or rales. There is no intercostal retraction or use of accessory muscles. No egophony noted. Cardiovascular: Regular without murmur, clicks, gallops or rubs. Abdomen: Slightly rounded and soft without organomegaly. No rebound, rigidity or guarding was appreciated. Lymphatic: No lymphadenopathy. Musculoskeletal: Normal curvature of the spine. No gross muscle weakness. Extremities:  does not have lower extremity edema,  no ulcerations, tenderness, varicosities or erythema. Muscle size, tone and strength are normal.  No involuntary movements are noted. Skin:  Warm and dry. Good color, turgor and pigmentation. No lesions or scars. No cyanosis or clubbing  Neurological/Psychiatric: The patient's general behavior, level of consciousness, thought content and emotional status is normal.          DATA:     Pulmonary function tests: In August 2021, but normal        CXR: REVIEWED: On November 2, 2022 was normal without any acute process      IMPRESSION:   1. Moderate persistent asthma without complication    2. Smoking                   PLAN:       Reviewed the pulmonary function test and the chest x-ray  Ordered asthma education  Refills were provided -Singulair 10 mg, prednisone taper  Hope to see the albuterol needs to go down with the usage of Singulair  Patient to continue to use [facility]  Patient was recommended to have prednisone and an antibiotic available for use during an exacerbation  Educated and clarified the medication use. Discussed use, benefit, and side effects of prescribed medications.   Barriers to medication compliance addressed. Nora Chacko received counseling on the following healthy behaviors: nutrition, exercise and medication adherence  Recommend flu vaccination in the fall annually. Recommendations given regarding pneumococcal vaccinations. Recommend COVID-19 vaccination  Patient is not uptodate with vaccinations from pulmonary perspective. Maintain an active lifestyle. recommend smoking cessation. Nora Chacko was instructed on smoking cessation. I discussed with this patient today the risks and benefits of various tobacco cessation strategies. Recommend pulmonary rehabilitation. Patient was educated on how to use the respiratory medications. All the questions that the patient has had were answered to   her satisfaction. Home O2 evaluation was done. Supplemental oxygen was not indicated  After reviewing the patient's smoking history and her age patient does not meet the criteria for lung cancer screening. We'll see the patient back in 6 months or earlier if needed. Patient will call us if she is sick, so she can be seen sooner. Thank you for having us involved in the care of your patient. Please call us if you have any questions or concerns.               Tariq Mariano MD MD  11/22/2022 2:19 PM

## 2023-02-10 ENCOUNTER — OFFICE VISIT (OUTPATIENT)
Dept: PRIMARY CARE CLINIC | Age: 43
End: 2023-02-10
Payer: COMMERCIAL

## 2023-02-10 VITALS
OXYGEN SATURATION: 98 % | HEIGHT: 60 IN | WEIGHT: 126 LBS | HEART RATE: 70 BPM | DIASTOLIC BLOOD PRESSURE: 64 MMHG | SYSTOLIC BLOOD PRESSURE: 110 MMHG | BODY MASS INDEX: 24.74 KG/M2

## 2023-02-10 DIAGNOSIS — G43.009 MIGRAINE WITHOUT AURA AND WITHOUT STATUS MIGRAINOSUS, NOT INTRACTABLE: Primary | ICD-10-CM

## 2023-02-10 DIAGNOSIS — J45.40 MODERATE PERSISTENT ASTHMA WITHOUT COMPLICATION: ICD-10-CM

## 2023-02-10 DIAGNOSIS — J06.9 UPPER RESPIRATORY TRACT INFECTION, UNSPECIFIED TYPE: ICD-10-CM

## 2023-02-10 PROCEDURE — 99213 OFFICE O/P EST LOW 20 MIN: CPT | Performed by: PHYSICIAN ASSISTANT

## 2023-02-10 PROCEDURE — G8427 DOCREV CUR MEDS BY ELIG CLIN: HCPCS | Performed by: PHYSICIAN ASSISTANT

## 2023-02-10 PROCEDURE — G8420 CALC BMI NORM PARAMETERS: HCPCS | Performed by: PHYSICIAN ASSISTANT

## 2023-02-10 PROCEDURE — 4004F PT TOBACCO SCREEN RCVD TLK: CPT | Performed by: PHYSICIAN ASSISTANT

## 2023-02-10 PROCEDURE — G8484 FLU IMMUNIZE NO ADMIN: HCPCS | Performed by: PHYSICIAN ASSISTANT

## 2023-02-10 RX ORDER — BENZONATATE 100 MG/1
100-200 CAPSULE ORAL 3 TIMES DAILY PRN
Qty: 60 CAPSULE | Refills: 0 | Status: SHIPPED | OUTPATIENT
Start: 2023-02-10 | End: 2023-02-17

## 2023-02-10 RX ORDER — AZITHROMYCIN 250 MG/1
TABLET, FILM COATED ORAL
Qty: 1 PACKET | Refills: 0 | Status: SHIPPED | OUTPATIENT
Start: 2023-02-10 | End: 2023-02-20

## 2023-02-10 RX ORDER — SUMATRIPTAN 100 MG/1
100 TABLET, FILM COATED ORAL
Qty: 10 TABLET | Refills: 1 | Status: SHIPPED | OUTPATIENT
Start: 2023-02-10 | End: 2023-02-10

## 2023-02-10 ASSESSMENT — ENCOUNTER SYMPTOMS
RHINORRHEA: 1
COUGH: 1
SORE THROAT: 0
CHEST TIGHTNESS: 1
SINUS PRESSURE: 1
TROUBLE SWALLOWING: 0

## 2023-02-10 NOTE — PROGRESS NOTES
[facility]  [address]  Phone: [phone]  Fax: [phone]    Lynda Jamison is a 36 y.o. female who presents today for her medical conditions/complaintsas noted below. Chief Complaint   Patient presents with    Nasal Congestion     Patient said sx started last Friday. Chest Congestion    Cough         HPI:     Chest Congestion  Associated symptoms include congestion, coughing and headaches (with aura). Pertinent negatives include no chills, diaphoresis, fever or sore throat. Cough  Associated symptoms include headaches (with aura) and rhinorrhea. Pertinent negatives include no chills, fever or sore throat. Started  Saturday with diarrhea and malasie. Then HA and Now cough, voice affected, nasal and chest congested. Tried Benedryl, Robitussin. Having her migraines again now with a scotoma. Exedrine is not helping. Current Outpatient Medications   Medication Sig Dispense Refill    SUMAtriptan (IMITREX) 100 MG tablet Take 1 tablet by mouth once as needed for Migraine 10 tablet 1    azithromycin (ZITHROMAX) 250 MG tablet 2 tablets now then 1 daily until gone.  1 packet 0    benzonatate (TESSALON) 100 MG capsule Take 1-2 capsules by mouth 3 times daily as needed for Cough 60 capsule 0    mometasone-formoterol (DULERA) 100-5 MCG/ACT inhaler Inhale 2 puffs into the lungs 2 times daily 1 each 5    albuterol sulfate HFA (VENTOLIN HFA) 108 (90 Base) MCG/ACT inhaler Inhale 2 puffs into the lungs 4 times daily as needed for Wheezing 1 each 5    albuterol (PROVENTIL) (2.5 MG/3ML) 0.083% nebulizer solution Take 3 mLs by nebulization every 4 hours as needed for Wheezing or Shortness of Breath 120 vial 0    montelukast (SINGULAIR) 10 MG tablet Take 1 tablet by mouth nightly (Patient not taking: Reported on 2/10/2023) 90 tablet 1     Current Facility-Administered Medications   Medication Dose Route Frequency Provider Last Rate Last Admin albuterol (PROVENTIL) nebulizer solution 2.5 mg  2.5 mg Nebulization Once Von Trivedi PA-C         Allergies   Allergen Reactions    Tramadol Other (See Comments) and Hives     Feels intoxicated for a long time  Keeps her up  Feels intoxicated for a long time    Ciprofloxacin     Vicodin [Hydrocodone-Acetaminophen] Other (See Comments)     Feels like bugs crawling all over    Adhesive Tape Rash     OK to use paper tape per patient    Percocet [Oxycodone-Acetaminophen] Nausea And Vomiting       Subjective:      Review of Systems   Constitutional:  Negative for chills, diaphoresis and fever. HENT:  Positive for congestion, rhinorrhea, sinus pressure and sneezing. Negative for sore throat and trouble swallowing. Respiratory:  Positive for cough and chest tightness. Neurological:  Positive for headaches (with aura). Objective:     /64   Pulse 70   Ht 5' (1.524 m)   Wt 126 lb (57.2 kg)   SpO2 98%   BMI 24.61 kg/m²   Physical Exam  Vitals and nursing note reviewed. Constitutional:       Appearance: Normal appearance. She is ill-appearing. HENT:      Right Ear: Tympanic membrane, ear canal and external ear normal.      Left Ear: Tympanic membrane, ear canal and external ear normal.      Nose: Congestion present. Mouth/Throat:      Mouth: Mucous membranes are moist.      Pharynx: Posterior oropharyngeal erythema present. No oropharyngeal exudate. Eyes:      General:         Right eye: No discharge. Left eye: No discharge. Conjunctiva/sclera: Conjunctivae normal.      Pupils: Pupils are equal, round, and reactive to light. Cardiovascular:      Rate and Rhythm: Normal rate and regular rhythm. Heart sounds: Normal heart sounds. Pulmonary:      Effort: Pulmonary effort is normal.      Breath sounds: Normal breath sounds. No wheezing, rhonchi or rales. Neurological:      Mental Status: She is alert and oriented to person, place, and time.    Psychiatric: Mood and Affect: Mood normal.       Assessment:       Diagnosis Orders   1. Migraine without aura and without status migrainosus, not intractable        2. Moderate persistent asthma without complication        3. Upper respiratory tract infection, unspecified type             Plan:    Complete ABX  Tessalon for cough   Allegra D  She needs to contact her pulmo specialist about the Singulair that she never received  Sent Imitrex in for the migraines. Again recommended tobacco cessation. Return if symptoms worsen or fail to improve. No orders of the defined types were placed in this encounter. Orders Placed This Encounter   Medications    SUMAtriptan (IMITREX) 100 MG tablet     Sig: Take 1 tablet by mouth once as needed for Migraine     Dispense:  10 tablet     Refill:  1    azithromycin (ZITHROMAX) 250 MG tablet     Si tablets now then 1 daily until gone.      Dispense:  1 packet     Refill:  0    benzonatate (TESSALON) 100 MG capsule     Sig: Take 1-2 capsules by mouth 3 times daily as needed for Cough     Dispense:  60 capsule     Refill:  0             Electronically signed by Mitzy Ortiz 2/10/2023 at 4:17 PM

## 2023-02-20 ENCOUNTER — OFFICE VISIT (OUTPATIENT)
Dept: PRIMARY CARE CLINIC | Age: 43
End: 2023-02-20

## 2023-02-20 VITALS
SYSTOLIC BLOOD PRESSURE: 112 MMHG | TEMPERATURE: 98 F | WEIGHT: 126 LBS | DIASTOLIC BLOOD PRESSURE: 76 MMHG | OXYGEN SATURATION: 100 % | BODY MASS INDEX: 24.74 KG/M2 | HEIGHT: 60 IN | HEART RATE: 61 BPM

## 2023-02-20 DIAGNOSIS — J02.9 PHARYNGITIS, UNSPECIFIED ETIOLOGY: Primary | ICD-10-CM

## 2023-02-20 DIAGNOSIS — J45.901 MODERATE ASTHMA WITH ACUTE EXACERBATION, UNSPECIFIED WHETHER PERSISTENT: ICD-10-CM

## 2023-02-20 LAB — S PYO AG THROAT QL: NORMAL

## 2023-02-20 RX ORDER — PREDNISONE 20 MG/1
20 TABLET ORAL 2 TIMES DAILY
Qty: 15 TABLET | Refills: 0 | Status: SHIPPED | OUTPATIENT
Start: 2023-02-20 | End: 2023-02-25

## 2023-02-20 ASSESSMENT — ENCOUNTER SYMPTOMS
WHEEZING: 1
VOICE CHANGE: 1
SHORTNESS OF BREATH: 1
COUGH: 1
SORE THROAT: 1
TROUBLE SWALLOWING: 1

## 2023-02-20 NOTE — PROGRESS NOTES
[facility]  [address]  Phone: [phone]  Fax: [phone]    Kathleen Carmichael is a 36 y.o. female who presents today for her medical conditions/complaintsas noted below. Chief Complaint   Patient presents with    Pharyngitis     Sore throat started Friday. Patient said she finished the Candelaria Henle on Thursday 2/16/23. Laryngitis    Cough         HPI:   Started Friday with ST   Has been around strep---rommmate    Using her [facility]. Singulair  Pharyngitis  Associated symptoms include congestion, coughing, diaphoresis (at night) and a sore throat. Pertinent negatives include no chest pain. Cough  Associated symptoms include a sore throat, shortness of breath and wheezing. Pertinent negatives include no chest pain or postnasal drip. Current Outpatient Medications   Medication Sig Dispense Refill    predniSONE (DELTASONE) 20 MG tablet Take 1 tablet by mouth 2 times daily for 5 days Then 1 tablet daily for 5 days 15 tablet 0    SUMAtriptan (IMITREX) 100 MG tablet Take 1 tablet by mouth once as needed for Migraine 10 tablet 1    montelukast (SINGULAIR) 10 MG tablet Take 1 tablet by mouth nightly 90 tablet 1    mometasone-formoterol (DULERA) 100-5 MCG/ACT inhaler Inhale 2 puffs into the lungs 2 times daily 1 each 5    albuterol sulfate HFA (VENTOLIN HFA) 108 (90 Base) MCG/ACT inhaler Inhale 2 puffs into the lungs 4 times daily as needed for Wheezing 1 each 5    azithromycin (ZITHROMAX) 250 MG tablet 2 tablets now then 1 daily until gone.  (Patient not taking: Reported on 2/20/2023) 1 packet 0    albuterol (PROVENTIL) (2.5 MG/3ML) 0.083% nebulizer solution Take 3 mLs by nebulization every 4 hours as needed for Wheezing or Shortness of Breath 120 vial 0     Current Facility-Administered Medications   Medication Dose Route Frequency Provider Last Rate Last Admin    albuterol (PROVENTIL) nebulizer solution 2.5 mg  2.5 mg Nebulization Once Duke Monae Aureliano aBnda PA-C         Allergies   Allergen Reactions    Tramadol Other (See Comments) and Hives     Feels intoxicated for a long time  Keeps her up  Feels intoxicated for a long time    Ciprofloxacin     Vicodin [Hydrocodone-Acetaminophen] Other (See Comments)     Feels like bugs crawling all over    Adhesive Tape Rash     OK to use paper tape per patient    Percocet [Oxycodone-Acetaminophen] Nausea And Vomiting       Subjective:      Review of Systems   Constitutional:  Positive for diaphoresis (at night). HENT:  Positive for congestion, sore throat, trouble swallowing and voice change. Negative for postnasal drip. Respiratory:  Positive for cough, shortness of breath and wheezing. Stridor: asthma attack. Cardiovascular:  Negative for chest pain. Objective:     /76   Pulse 61   Temp 98 °F (36.7 °C)   Ht 5' (1.524 m)   Wt 126 lb (57.2 kg)   SpO2 100%   BMI 24.61 kg/m²   Physical Exam  Vitals and nursing note reviewed. Constitutional:       Appearance: Normal appearance. She is ill-appearing. HENT:      Right Ear: Tympanic membrane, ear canal and external ear normal.      Left Ear: Tympanic membrane, ear canal and external ear normal.      Nose: Nose normal.      Mouth/Throat:      Mouth: Mucous membranes are moist.      Pharynx: Posterior oropharyngeal erythema present. No oropharyngeal exudate. Eyes:      General:         Right eye: No discharge. Left eye: No discharge. Conjunctiva/sclera: Conjunctivae normal.      Pupils: Pupils are equal, round, and reactive to light. Cardiovascular:      Rate and Rhythm: Normal rate and regular rhythm. Heart sounds: Normal heart sounds. Pulmonary:      Effort: Pulmonary effort is normal.      Breath sounds: Wheezing (in bases that makes her cough) present. Musculoskeletal:      Cervical back: Normal range of motion.    Lymphadenopathy:      Head:      Right side of head: No submental, submandibular, tonsillar, preauricular, posterior auricular or occipital adenopathy. Left side of head: No submental, submandibular, tonsillar, preauricular, posterior auricular or occipital adenopathy. Cervical: No cervical adenopathy. Neurological:      Mental Status: She is alert. Assessment:       Diagnosis Orders   1. Pharyngitis, unspecified etiology  POCT rapid strep A      2. Moderate asthma with acute exacerbation, unspecified whether persistent  predniSONE (DELTASONE) 20 MG tablet           Plan:    Strep is negative  Prednisone for flare  Albuterol every 4 hours for 2 days  Get back on singulair with Shruthi Murrieta for cough if needed    Return if symptoms worsen or fail to improve.     Orders Placed This Encounter   Procedures    POCT rapid strep A       Orders Placed This Encounter   Medications    predniSONE (DELTASONE) 20 MG tablet     Sig: Take 1 tablet by mouth 2 times daily for 5 days Then 1 tablet daily for 5 days     Dispense:  15 tablet     Refill:  0             Electronically signed by Mitzy Oliver 2/20/2023 at 2:24 PM

## 2023-03-14 ENCOUNTER — OFFICE VISIT (OUTPATIENT)
Dept: FAMILY MEDICINE CLINIC | Age: 43
End: 2023-03-14
Payer: MEDICAID

## 2023-03-14 VITALS
HEIGHT: 62 IN | OXYGEN SATURATION: 97 % | BODY MASS INDEX: 39.75 KG/M2 | HEART RATE: 77 BPM | SYSTOLIC BLOOD PRESSURE: 156 MMHG | TEMPERATURE: 97.8 F | DIASTOLIC BLOOD PRESSURE: 92 MMHG | WEIGHT: 216 LBS | RESPIRATION RATE: 16 BRPM

## 2023-03-14 DIAGNOSIS — E78.2 MIXED HYPERLIPIDEMIA: ICD-10-CM

## 2023-03-14 DIAGNOSIS — E03.9 HYPOTHYROIDISM, UNSPECIFIED TYPE: Primary | ICD-10-CM

## 2023-03-14 DIAGNOSIS — R73.01 IMPAIRED FASTING GLUCOSE: ICD-10-CM

## 2023-03-14 DIAGNOSIS — E03.9 HYPOTHYROIDISM, UNSPECIFIED TYPE: ICD-10-CM

## 2023-03-14 LAB
ALBUMIN SERPL-MCNC: 4.8 G/DL (ref 3.5–5.2)
ALP BLD-CCNC: 72 U/L (ref 35–104)
ALT SERPL-CCNC: 26 U/L (ref 0–32)
ANION GAP SERPL CALCULATED.3IONS-SCNC: 10 MMOL/L (ref 7–16)
AST SERPL-CCNC: 19 U/L (ref 0–31)
BILIRUB SERPL-MCNC: 0.4 MG/DL (ref 0–1.2)
BUN BLDV-MCNC: 11 MG/DL (ref 6–20)
CALCIUM SERPL-MCNC: 9.7 MG/DL (ref 8.6–10.2)
CHLORIDE BLD-SCNC: 103 MMOL/L (ref 98–107)
CHOLESTEROL, TOTAL: 164 MG/DL (ref 0–199)
CO2: 28 MMOL/L (ref 22–29)
CREAT SERPL-MCNC: 0.8 MG/DL (ref 0.5–1)
GFR SERPL CREATININE-BSD FRML MDRD: >60 ML/MIN/1.73
GLUCOSE BLD-MCNC: 90 MG/DL (ref 74–99)
HBA1C MFR BLD: 5.4 % (ref 4–5.6)
HCT VFR BLD CALC: 42.5 % (ref 34–48)
HDLC SERPL-MCNC: 55 MG/DL
HEMOGLOBIN: 13.4 G/DL (ref 11.5–15.5)
LDL CHOLESTEROL CALCULATED: 73 MG/DL (ref 0–99)
MCH RBC QN AUTO: 27.1 PG (ref 26–35)
MCHC RBC AUTO-ENTMCNC: 31.5 % (ref 32–34.5)
MCV RBC AUTO: 85.9 FL (ref 80–99.9)
PDW BLD-RTO: 13.1 FL (ref 11.5–15)
PLATELET # BLD: 333 E9/L (ref 130–450)
PMV BLD AUTO: 11.1 FL (ref 7–12)
POTASSIUM SERPL-SCNC: 4.9 MMOL/L (ref 3.5–5)
RBC # BLD: 4.95 E12/L (ref 3.5–5.5)
SODIUM BLD-SCNC: 141 MMOL/L (ref 132–146)
T4 FREE: 1.55 NG/DL (ref 0.93–1.7)
TOTAL PROTEIN: 7.7 G/DL (ref 6.4–8.3)
TRIGL SERPL-MCNC: 179 MG/DL (ref 0–149)
TSH SERPL DL<=0.05 MIU/L-ACNC: 2.08 UIU/ML (ref 0.27–4.2)
VLDLC SERPL CALC-MCNC: 36 MG/DL
WBC # BLD: 9.3 E9/L (ref 4.5–11.5)

## 2023-03-14 PROCEDURE — 99204 OFFICE O/P NEW MOD 45 MIN: CPT | Performed by: FAMILY MEDICINE

## 2023-03-14 RX ORDER — HYDROCHLOROTHIAZIDE 25 MG/1
25 TABLET ORAL EVERY MORNING
Qty: 30 TABLET | Refills: 3 | Status: SHIPPED | OUTPATIENT
Start: 2023-03-14

## 2023-03-14 RX ORDER — ROSUVASTATIN CALCIUM 5 MG/1
5 TABLET, COATED ORAL DAILY
COMMUNITY

## 2023-03-14 RX ORDER — LEVOTHYROXINE SODIUM 0.1 MG/1
100 TABLET ORAL DAILY
Qty: 30 TABLET | Refills: 3 | Status: SHIPPED
Start: 2023-03-14

## 2023-03-14 SDOH — ECONOMIC STABILITY: FOOD INSECURITY: WITHIN THE PAST 12 MONTHS, YOU WORRIED THAT YOUR FOOD WOULD RUN OUT BEFORE YOU GOT MONEY TO BUY MORE.: NEVER TRUE

## 2023-03-14 SDOH — ECONOMIC STABILITY: HOUSING INSECURITY
IN THE LAST 12 MONTHS, WAS THERE A TIME WHEN YOU DID NOT HAVE A STEADY PLACE TO SLEEP OR SLEPT IN A SHELTER (INCLUDING NOW)?: NO

## 2023-03-14 SDOH — ECONOMIC STABILITY: INCOME INSECURITY: HOW HARD IS IT FOR YOU TO PAY FOR THE VERY BASICS LIKE FOOD, HOUSING, MEDICAL CARE, AND HEATING?: NOT HARD AT ALL

## 2023-03-14 SDOH — ECONOMIC STABILITY: FOOD INSECURITY: WITHIN THE PAST 12 MONTHS, THE FOOD YOU BOUGHT JUST DIDN'T LAST AND YOU DIDN'T HAVE MONEY TO GET MORE.: NEVER TRUE

## 2023-03-14 ASSESSMENT — PATIENT HEALTH QUESTIONNAIRE - PHQ9
SUM OF ALL RESPONSES TO PHQ QUESTIONS 1-9: 0
SUM OF ALL RESPONSES TO PHQ QUESTIONS 1-9: 0
1. LITTLE INTEREST OR PLEASURE IN DOING THINGS: 0
SUM OF ALL RESPONSES TO PHQ QUESTIONS 1-9: 0
SUM OF ALL RESPONSES TO PHQ QUESTIONS 1-9: 0
SUM OF ALL RESPONSES TO PHQ9 QUESTIONS 1 & 2: 0
2. FEELING DOWN, DEPRESSED OR HOPELESS: 0

## 2023-03-14 NOTE — PROGRESS NOTES
HPI:  The patient is a 36 y.o. female who presents today to establish care. The patient has no complaints. She is due for labs and med refills. Pt is fasting. Hm reviewed. Past Medical History:   Diagnosis Date    Abnormal Pap smear of cervix     LEEP    Diabetes mellitus ([facility] [address]     Gestational diabetes     Pre-eclampsia     in prior pregnancy     Thyroid disease       Past Surgical History:   Procedure Laterality Date     SECTION      LEEP        Family History   Problem Relation Age of Onset    Hypertension Father      Social History     Socioeconomic History    Marital status:      Spouse name: Not on file    Number of children: Not on file    Years of education: Not on file    Highest education level: Not on file   Occupational History    Not on file   Tobacco Use    Smoking status: Never    Smokeless tobacco: Never   Substance and Sexual Activity    Alcohol use: No    Drug use: No    Sexual activity: Yes     Partners: Male   Other Topics Concern    Not on file   Social History Narrative    Not on file     Social Determinants of Health     Financial Resource Strain: Low Risk     Difficulty of Paying Living Expenses: Not hard at all   Food Insecurity: No Food Insecurity    Worried About Running Out of Food in the Last Year: Never true    [address] in the Last Year: Never true   Transportation Needs: Unknown    Lack of Transportation (Medical): Not on file    Lack of Transportation (Non-Medical):  No   Physical Activity: Not on file   Stress: Not on file   Social Connections: Not on file   Intimate Partner Violence: Not on file   Housing Stability: Unknown    Unable to Pay for Housing in the Last Year: Not on file    Number of Places Lived in the Last Year: Not on file    Unstable Housing in the Last Year: No      Allergies   Allergen Reactions    Cephalexin Other (See Comments)     Kidney stones    Lodine [Etodolac] Other (See Comments)     Unknown reaction        Review of Systems:  Constitutional:  No fever, no fatigue, no chills, no headaches, no weight change  Dermatology:  No rash, no mole, no dry or sensitive skin  ENT:  No cough, no sore throat, no sinus pain, no runny nose, no ear pain  Cardiology:  No chest pain, no palpitations, no leg edema, no shortness of breath, no PND  Endocrinology:  No polydipsia, no polyuria, no cold intolerance, no heat intolerance, no polyphagia, no hair changes  Gastroenterology:  No dysphagia, no abdominal pain, no nausea, no vomiting, no constipation, no diarrhea, no heartburn  Female Reproductive:  No hot flashes, no abnormal vaginal discharge, no pain with menstruation, no pelvic pain  Musculoskeletal:  No joint pain, no leg cramps, no back pain, no muscle aches  Respiratory:  No shortness of breath, no orthopnea, no wheezing, no TODD, no hemoptysis  Urology:  No blood in the urine, no urinary frequency, no urinary incontinence, no urinary urgency, no nocturia, no dysuria  Neurology:  No numbness/tingling, no dizziness, no weakness  Psychology:  No depression, no sleep disturbances, no suicidal ideation, no anxiety  Vitals:    03/14/23 0920 03/14/23 0923   BP: (!) 144/90 (!) 156/92   Pulse: 77    Resp: 16    Temp: 97.8 °F (36.6 °C)    TempSrc: Temporal    SpO2: 97%    Weight: 216 lb (98 kg)    Height: 5' 2\" (1.575 m)        General:  Patient alert and oriented x 3, NAD, pleasant  HEENT:  Atraumatic, normocephalic, PERRLA, EOMI, clear conjunctiva, TMs clear, nose-clear, throat - no erythema  Neck:  Supple, no goiter, no carotid bruits, no lymphadenopathy  Lungs:  CTA B  Heart:  RRR, no murmurs, gallops or rubs  Abdomen:  Soft/nt/nd, + bowel sounds  Extremities:  No clubbing, cyanosis or edema  Skin: unremarkable    Assessment/Plan:      Any Suarez was seen today for establish care. Diagnoses and all orders for this visit:    Hypothyroidism, unspecified type  -     TSH;  Future  -     T4, Free; Future    Impaired fasting glucose  -     Hemoglobin A1C; Future    Mixed hyperlipidemia  -     Comprehensive Metabolic Panel; Future  -     CBC; Future  -     Lipid Panel; Future    Other orders  -     levothyroxine (SYNTHROID) 100 MCG tablet; Take 1 tablet by mouth Daily  -     hydroCHLOROthiazide (HYDRODIURIL) 25 MG tablet; Take 1 tablet by mouth every morning      As above. Call or go to ED immediately if symptoms worsen or persist.  No follow-ups on file. , or sooner if necessary. Educational materials and/or home exercises printed for patient's review and were included in patient instructions on his/her After Visit Summary and given to patient at the end of visit. Counseled regarding above diagnosis, including possible risks and complications,  especially if left uncontrolled. Counseled regarding the possible side effects, risks, benefits and alternatives to treatment; patient and/or guardian verbalizes understanding, agrees, feels comfortable with and wishes to proceed with above treatment plan. Advised patient to call with any new medication issues, and read all Rx info from pharmacy to assure aware of all possible risks and side effects of medication before taking. Reviewed age and gender appropriate health screening exams and vaccinations. Advised patient regarding importance of keeping up with recommended health maintenance and to schedule as soon as possible if overdue, as this is important in assessing for undiagnosed pathology, especially cancer, as well as protecting against potentially harmful/life threatening disease. Patient and/or guardian verbalizes understanding and agrees with above counseling, assessment and plan. All questions answered. Jyotsna Mojica MD  3/19/2023    I have personally reviewed and updated the chief complaint, HPI, Past Medical, Family and Social History, as well as the above Review of Systems.

## 2023-03-27 ENCOUNTER — TELEPHONE (OUTPATIENT)
Dept: FAMILY MEDICINE CLINIC | Age: 43
End: 2023-03-27

## 2023-03-29 RX ORDER — AMLODIPINE BESYLATE 5 MG/1
5 TABLET ORAL DAILY
Qty: 30 TABLET | Refills: 3 | Status: SHIPPED | OUTPATIENT
Start: 2023-03-29

## 2023-03-29 NOTE — TELEPHONE ENCOUNTER
Pt notified, verbalized understanding. Norvasc pended and routed. Pt would like to know if Norvasc is taking in addition to other med or if she is to stop other med?

## 2023-03-31 ENCOUNTER — TELEPHONE (OUTPATIENT)
Dept: FAMILY MEDICINE CLINIC | Age: 43
End: 2023-03-31

## 2023-04-05 RX ORDER — HYDROCHLOROTHIAZIDE 25 MG/1
TABLET ORAL
Qty: 30 TABLET | Refills: 3 | Status: SHIPPED | OUTPATIENT
Start: 2023-04-05

## 2023-04-21 ENCOUNTER — OFFICE VISIT (OUTPATIENT)
Dept: FAMILY MEDICINE CLINIC | Age: 43
End: 2023-04-21
Payer: COMMERCIAL

## 2023-04-21 VITALS
TEMPERATURE: 97.9 F | OXYGEN SATURATION: 98 % | WEIGHT: 216 LBS | RESPIRATION RATE: 16 BRPM | SYSTOLIC BLOOD PRESSURE: 122 MMHG | BODY MASS INDEX: 38.27 KG/M2 | DIASTOLIC BLOOD PRESSURE: 68 MMHG | HEART RATE: 83 BPM | HEIGHT: 63 IN

## 2023-04-21 DIAGNOSIS — I10 PRIMARY HYPERTENSION: Primary | ICD-10-CM

## 2023-04-21 DIAGNOSIS — E78.2 MIXED HYPERLIPIDEMIA: ICD-10-CM

## 2023-04-21 PROCEDURE — 3074F SYST BP LT 130 MM HG: CPT | Performed by: FAMILY MEDICINE

## 2023-04-21 PROCEDURE — 3078F DIAST BP <80 MM HG: CPT | Performed by: FAMILY MEDICINE

## 2023-04-21 PROCEDURE — 99213 OFFICE O/P EST LOW 20 MIN: CPT | Performed by: FAMILY MEDICINE

## 2023-04-21 PROCEDURE — G8417 CALC BMI ABV UP PARAM F/U: HCPCS | Performed by: FAMILY MEDICINE

## 2023-04-21 PROCEDURE — 1036F TOBACCO NON-USER: CPT | Performed by: FAMILY MEDICINE

## 2023-04-21 PROCEDURE — G8427 DOCREV CUR MEDS BY ELIG CLIN: HCPCS | Performed by: FAMILY MEDICINE

## 2023-04-21 RX ORDER — AMLODIPINE BESYLATE 5 MG/1
5 TABLET ORAL DAILY
Qty: 90 TABLET | Refills: 3 | Status: SHIPPED | OUTPATIENT
Start: 2023-04-21

## 2023-04-21 RX ORDER — LOSARTAN POTASSIUM AND HYDROCHLOROTHIAZIDE 25; 100 MG/1; MG/1
1 TABLET ORAL DAILY
Qty: 90 TABLET | Refills: 3 | Status: SHIPPED | OUTPATIENT
Start: 2023-04-21

## 2023-05-09 ENCOUNTER — HOSPITAL ENCOUNTER (OUTPATIENT)
Age: 43
Setting detail: SPECIMEN
Discharge: HOME | End: 2023-05-09

## 2023-05-09 ENCOUNTER — TELEPHONE (OUTPATIENT)
Dept: PRIMARY CARE CLINIC | Age: 43
End: 2023-05-09

## 2023-05-09 ENCOUNTER — NURSE ONLY (OUTPATIENT)
Dept: PRIMARY CARE CLINIC | Age: 43
End: 2023-05-09
Payer: COMMERCIAL

## 2023-05-09 DIAGNOSIS — R53.83 OTHER FATIGUE: ICD-10-CM

## 2023-05-09 DIAGNOSIS — R10.9 FLANK PAIN: Primary | ICD-10-CM

## 2023-05-09 DIAGNOSIS — R10.9 FLANK PAIN: ICD-10-CM

## 2023-05-09 DIAGNOSIS — M54.9 BACK PAIN, UNSPECIFIED BACK LOCATION, UNSPECIFIED BACK PAIN LATERALITY, UNSPECIFIED CHRONICITY: Primary | ICD-10-CM

## 2023-05-09 LAB
BILIRUBIN, POC: NEGATIVE
BLOOD URINE, POC: NORMAL
CLARITY, POC: NORMAL
COLOR, POC: NORMAL
GLUCOSE URINE, POC: NEGATIVE
KETONES, POC: NEGATIVE
LEUKOCYTE EST, POC: NEGATIVE
NITRITE, POC: NEGATIVE
PH, POC: 6
PROTEIN, POC: NEGATIVE
SPECIFIC GRAVITY, POC: >=1.03
UROBILINOGEN, POC: NORMAL

## 2023-05-09 PROCEDURE — 81002 URINALYSIS NONAUTO W/O SCOPE: CPT | Performed by: PHYSICIAN ASSISTANT

## 2023-05-09 NOTE — TELEPHONE ENCOUNTER
She is having severe back pain, Fatigue, hot and cold sweats. Thinks she has a UTI that has went to her kidneys. [id number] [address]acio for lab orders? Pended below.

## 2023-05-09 NOTE — PROGRESS NOTES
Pt is here for a Ua and culture per Coby Ignacio. Pt denies dysuria, blood in urine, and fevers. Pt was advised per UA, it shows dehydration per Coby Ignacio.

## 2023-05-10 LAB
MICROORGANISM SPEC CULT: NORMAL
SPECIMEN DESCRIPTION: NORMAL

## 2023-06-06 ENCOUNTER — TELEPHONE (OUTPATIENT)
Dept: FAMILY MEDICINE CLINIC | Age: 43
End: 2023-06-06

## 2023-06-06 RX ORDER — LEVOTHYROXINE SODIUM 100 MCG
100 TABLET ORAL DAILY
Qty: 30 TABLET | Refills: 3 | Status: SHIPPED | OUTPATIENT
Start: 2023-06-06

## 2023-06-06 RX ORDER — ROSUVASTATIN CALCIUM 5 MG/1
5 TABLET, COATED ORAL DAILY
Qty: 30 TABLET | Refills: 1 | Status: SHIPPED | OUTPATIENT
Start: 2023-06-06

## 2023-06-06 NOTE — TELEPHONE ENCOUNTER
PT called today for refills:    Rosuvastatin  generic ok  Synthroid    PT requested name brand for this med    [facility] [address]

## 2023-06-21 ENCOUNTER — TELEPHONE (OUTPATIENT)
Dept: FAMILY MEDICINE CLINIC | Age: 43
End: 2023-06-21

## 2023-06-21 NOTE — TELEPHONE ENCOUNTER
Pt was seen by OBGYN for yearly exam mentioned to him she thought she had a Hemorid he referred her to a general surgeon and she will be having a colonoscopy she is calling to give you a heads up in case the results come to the office.

## 2023-06-29 RX ORDER — ROSUVASTATIN CALCIUM 5 MG/1
TABLET, COATED ORAL
Qty: 30 TABLET | Refills: 1 | Status: SHIPPED | OUTPATIENT
Start: 2023-06-29

## 2023-07-10 RX ORDER — LEVOTHYROXINE SODIUM 100 MCG
TABLET ORAL
Qty: 30 TABLET | Refills: 3 | Status: SHIPPED | OUTPATIENT
Start: 2023-07-10

## 2023-08-07 ENCOUNTER — TELEPHONE (OUTPATIENT)
Dept: PRIMARY CARE CLINIC | Age: 43
End: 2023-08-07

## 2023-08-07 NOTE — TELEPHONE ENCOUNTER
I can see her if she is not going to claim it as Worker's comp. She will need to be scheduled at 1/2 hour--probably can't get her in this week.

## 2023-08-07 NOTE — TELEPHONE ENCOUNTER
Patient went to [facility] for right shoulder pain and neck pain. She said it happened at work when she was lifting parts over her head. I advised patient it would have to go through [employer] and she states she will not be filing. Will you see the patient? States she has a bad back and doesn't want to do any more damage by continuing to do lifting at work. Again, patient admits this happened at work but does not want to go through [place].      ----- Message from Shelli Mathis sent at 8/4/2023  3:02 PM EDT -----  Subject: Appointment Request    Reason for Call: Established Patient Appointment needed: Routine ED Follow   Up Visit    QUESTIONS    Reason for appointment request? No appointments available during search     Additional Information for Provider?  Please call to schedule a Post ED   (8/3/23) visit for right shoulder pain / patient would like to be   scheduled within 3days   ---------------------------------------------------------------------------  --------------  Eliot ENGLAND  [phone]; OK to leave message on voicemail  ---------------------------------------------------------------------------  --------------  SCRIPT ANSWERS

## 2023-08-17 ENCOUNTER — OFFICE VISIT (OUTPATIENT)
Dept: PRIMARY CARE CLINIC | Age: 43
End: 2023-08-17
Payer: COMMERCIAL

## 2023-08-17 VITALS
WEIGHT: 120.2 LBS | BODY MASS INDEX: 23.6 KG/M2 | OXYGEN SATURATION: 100 % | HEIGHT: 60 IN | HEART RATE: 62 BPM | DIASTOLIC BLOOD PRESSURE: 68 MMHG | SYSTOLIC BLOOD PRESSURE: 110 MMHG

## 2023-08-17 DIAGNOSIS — R20.2 PARESTHESIA OF RIGHT ARM: Primary | ICD-10-CM

## 2023-08-17 DIAGNOSIS — M25.511 ACUTE PAIN OF RIGHT SHOULDER: ICD-10-CM

## 2023-08-17 DIAGNOSIS — S16.1XXD STRAIN OF NECK MUSCLE, SUBSEQUENT ENCOUNTER: ICD-10-CM

## 2023-08-17 PROCEDURE — G8420 CALC BMI NORM PARAMETERS: HCPCS | Performed by: PHYSICIAN ASSISTANT

## 2023-08-17 PROCEDURE — 4004F PT TOBACCO SCREEN RCVD TLK: CPT | Performed by: PHYSICIAN ASSISTANT

## 2023-08-17 PROCEDURE — G8427 DOCREV CUR MEDS BY ELIG CLIN: HCPCS | Performed by: PHYSICIAN ASSISTANT

## 2023-08-17 PROCEDURE — 99214 OFFICE O/P EST MOD 30 MIN: CPT | Performed by: PHYSICIAN ASSISTANT

## 2023-08-17 RX ORDER — PREDNISONE 20 MG/1
20 TABLET ORAL 2 TIMES DAILY
Qty: 10 TABLET | Refills: 0 | Status: SHIPPED | OUTPATIENT
Start: 2023-08-17 | End: 2023-08-22

## 2023-08-17 RX ORDER — TIZANIDINE 4 MG/1
TABLET ORAL
COMMUNITY
Start: 2023-08-04

## 2023-08-17 SDOH — ECONOMIC STABILITY: INCOME INSECURITY: HOW HARD IS IT FOR YOU TO PAY FOR THE VERY BASICS LIKE FOOD, HOUSING, MEDICAL CARE, AND HEATING?: NOT HARD AT ALL

## 2023-08-17 SDOH — ECONOMIC STABILITY: FOOD INSECURITY: WITHIN THE PAST 12 MONTHS, THE FOOD YOU BOUGHT JUST DIDN'T LAST AND YOU DIDN'T HAVE MONEY TO GET MORE.: NEVER TRUE

## 2023-08-17 SDOH — ECONOMIC STABILITY: FOOD INSECURITY: WITHIN THE PAST 12 MONTHS, YOU WORRIED THAT YOUR FOOD WOULD RUN OUT BEFORE YOU GOT MONEY TO BUY MORE.: NEVER TRUE

## 2023-08-17 ASSESSMENT — PATIENT HEALTH QUESTIONNAIRE - PHQ9
SUM OF ALL RESPONSES TO PHQ QUESTIONS 1-9: 0
SUM OF ALL RESPONSES TO PHQ9 QUESTIONS 1 & 2: 0
SUM OF ALL RESPONSES TO PHQ QUESTIONS 1-9: 0
SUM OF ALL RESPONSES TO PHQ QUESTIONS 1-9: 0
1. LITTLE INTEREST OR PLEASURE IN DOING THINGS: 0
2. FEELING DOWN, DEPRESSED OR HOPELESS: 0
SUM OF ALL RESPONSES TO PHQ QUESTIONS 1-9: 0

## 2023-08-17 NOTE — PROGRESS NOTES
this time  If the numbness continues after the burst, get a neck s-ray. Return if symptoms worsen or fail to improve.     Orders Placed This Encounter   Procedures    External Referral To Physical Therapy     Referral Priority:   Routine     Referral Type:   Eval and Treat     Referral Reason:   Specialty Services Required     Requested Specialty:   Physical Therapist     Number of Visits Requested:   1     Orders Placed This Encounter   Medications    predniSONE (DELTASONE) 20 MG tablet     Sig: Take 1 tablet by mouth 2 times daily for 5 days     Dispense:  10 tablet     Refill:  0           Electronically signed by Mitzy Mott 8/17/2023 at 12:01 PM

## 2023-08-22 ENCOUNTER — TELEPHONE (OUTPATIENT)
Dept: PRIMARY CARE CLINIC | Age: 43
End: 2023-08-22

## 2023-08-22 NOTE — TELEPHONE ENCOUNTER
Pt's employer would like to know when she will be off of restrictions and the weight limit for her restrictions. Please advise.

## 2023-08-23 NOTE — TELEPHONE ENCOUNTER
Please ask Иван Crawford if she has gotten into PT yet? Also ask if she can lift regularly, not over her head, up to 20 pounds. I am restricting her to no over the head lifting.

## 2023-08-24 ENCOUNTER — OFFICE VISIT (OUTPATIENT)
Dept: PRIMARY CARE CLINIC | Age: 43
End: 2023-08-24
Payer: COMMERCIAL

## 2023-08-24 VITALS
OXYGEN SATURATION: 99 % | HEART RATE: 73 BPM | WEIGHT: 115 LBS | SYSTOLIC BLOOD PRESSURE: 112 MMHG | HEIGHT: 60 IN | BODY MASS INDEX: 22.58 KG/M2 | DIASTOLIC BLOOD PRESSURE: 62 MMHG

## 2023-08-24 DIAGNOSIS — A08.4 VIRAL GASTROENTERITIS: Primary | ICD-10-CM

## 2023-08-24 PROCEDURE — 99213 OFFICE O/P EST LOW 20 MIN: CPT | Performed by: PHYSICIAN ASSISTANT

## 2023-08-24 PROCEDURE — G8420 CALC BMI NORM PARAMETERS: HCPCS | Performed by: PHYSICIAN ASSISTANT

## 2023-08-24 PROCEDURE — 4004F PT TOBACCO SCREEN RCVD TLK: CPT | Performed by: PHYSICIAN ASSISTANT

## 2023-08-24 PROCEDURE — G8427 DOCREV CUR MEDS BY ELIG CLIN: HCPCS | Performed by: PHYSICIAN ASSISTANT

## 2023-08-24 RX ORDER — ROSUVASTATIN CALCIUM 5 MG/1
TABLET, COATED ORAL
Qty: 30 TABLET | Refills: 1 | Status: SHIPPED | OUTPATIENT
Start: 2023-08-24

## 2023-08-24 ASSESSMENT — ENCOUNTER SYMPTOMS
VOMITING: 0
ABDOMINAL PAIN: 1
DIARRHEA: 1
NAUSEA: 0

## 2023-08-24 NOTE — PROGRESS NOTES
[facility]  [address]   Phone: [phone]  Fax: [phone]    Wander Weston is a 36 y.o. female who presents today for her medical conditions/complaintsas noted below. Chief Complaint   Patient presents with    Diarrhea     Pt has diarrhea. Pt states state this started yesterday and still has it today. Pt states anytime  she eats  she has to use the bathroom and hasn't ate anything since yesterday. Along with stomach cramping no nausea         HPI:     Diarrhea   Associated symptoms include abdominal pain and chills. Pertinent negatives include no fever or vomiting. Travel: no  Recent ABX: no  New meds:no  New foods: no  Ill contacts: No  Not eating   No fever  NO blood  Current Outpatient Medications   Medication Sig Dispense Refill    SUMAtriptan (IMITREX) 100 MG tablet Take 1 tablet by mouth once as needed for Migraine 10 tablet 1    montelukast (SINGULAIR) 10 MG tablet Take 1 tablet by mouth nightly 90 tablet 1    mometasone-formoterol (DULERA) 100-5 MCG/ACT inhaler Inhale 2 puffs into the lungs 2 times daily 1 each 5    albuterol sulfate HFA (VENTOLIN HFA) 108 (90 Base) MCG/ACT inhaler Inhale 2 puffs into the lungs 4 times daily as needed for Wheezing 1 each 5    albuterol (PROVENTIL) (2.5 MG/3ML) 0.083% nebulizer solution Take 3 mLs by nebulization every 4 hours as needed for Wheezing or Shortness of Breath 120 vial 0    tiZANidine (ZANAFLEX) 4 MG tablet TAKE 1 TABLET BY MOUTH EVERY 6 HOURS AS NEEDED FOR MUSCLE SPASMS.  (Patient not taking: Reported on 8/24/2023)       Current Facility-Administered Medications   Medication Dose Route Frequency Provider Last Rate Last Admin    albuterol (PROVENTIL) nebulizer solution 2.5 mg  2.5 mg Nebulization Once Maranda Ruiz PA-C         Allergies   Allergen Reactions    Tramadol Other (See Comments) and Hives     Feels intoxicated for a long time  Keeps her up  Feels intoxicated for a long

## 2023-09-15 ENCOUNTER — OFFICE VISIT (OUTPATIENT)
Dept: PRIMARY CARE CLINIC | Age: 43
End: 2023-09-15
Payer: COMMERCIAL

## 2023-09-15 VITALS
SYSTOLIC BLOOD PRESSURE: 114 MMHG | OXYGEN SATURATION: 99 % | WEIGHT: 117 LBS | DIASTOLIC BLOOD PRESSURE: 70 MMHG | HEIGHT: 60 IN | HEART RATE: 79 BPM | BODY MASS INDEX: 22.97 KG/M2

## 2023-09-15 DIAGNOSIS — M54.2 NECK PAIN, MUSCULOSKELETAL: Primary | ICD-10-CM

## 2023-09-15 PROCEDURE — G8420 CALC BMI NORM PARAMETERS: HCPCS | Performed by: STUDENT IN AN ORGANIZED HEALTH CARE EDUCATION/TRAINING PROGRAM

## 2023-09-15 PROCEDURE — G8427 DOCREV CUR MEDS BY ELIG CLIN: HCPCS | Performed by: STUDENT IN AN ORGANIZED HEALTH CARE EDUCATION/TRAINING PROGRAM

## 2023-09-15 PROCEDURE — 4004F PT TOBACCO SCREEN RCVD TLK: CPT | Performed by: STUDENT IN AN ORGANIZED HEALTH CARE EDUCATION/TRAINING PROGRAM

## 2023-09-15 PROCEDURE — 99213 OFFICE O/P EST LOW 20 MIN: CPT | Performed by: STUDENT IN AN ORGANIZED HEALTH CARE EDUCATION/TRAINING PROGRAM

## 2023-09-15 RX ORDER — ROSUVASTATIN CALCIUM 5 MG/1
TABLET, COATED ORAL
Qty: 30 TABLET | Refills: 1 | Status: SHIPPED | OUTPATIENT
Start: 2023-09-15

## 2023-09-15 ASSESSMENT — ENCOUNTER SYMPTOMS
BACK PAIN: 1
NAUSEA: 0
SHORTNESS OF BREATH: 0
DIARRHEA: 0
ABDOMINAL PAIN: 0
VOMITING: 0
WHEEZING: 0

## 2023-09-15 NOTE — PROGRESS NOTES
[id number] PRIMARY CARE  [address] [facility] [address]  Dept: Rock is a 36 y.o. female Established patient, who presents acutely for her complaints as noted below:    Chief Complaint   Patient presents with    Neck Pain     Neck and shoulder pain . Patient said its been going on for about a month, currently doing PT .       HPI:     As noted above. Longer than a month. Thought that she initially pulled a muscle in her neck. PT does not seem to be helping. Has been participating for about 3 weeks now. Not improving. Notes some tingling in the right hand, weakness related to pain in shoulders. 7/10, sharp pain. Reviewed prior notes from PCP. Reviewed previous labs.     No results found for: \"LDLCHOLESTEROL\", \"LDLCALC\"    (goal LDL is <100)   TSH (mIU/L)   Date Value   06/05/2020 1.71     BP Readings from Last 3 Encounters:   09/15/23 114/70   08/24/23 112/62   08/17/23 110/68          (goal 120/80)    Past Medical History:   Diagnosis Date    Abdominal pain     feels crampy/colicky    Abnormal CT of the abdomen     Acute upper respiratory infection     Arthritis     OA in lower back     Asthma     Coccyx contusion     Fractured coccyx ([address])     H/O acute pharyngitis     H/O acute sinusitis     H/O contact dermatitis     H/O nausea and vomiting     H/O pyelonephritis     Laryngitis 08/2021    hopitalized in [facility] for 4-5 days for trouble breathing, intubated for 4 hours    Lump of skin     Sciatica     right side    SOB (shortness of breath) on exertion 08/24/2021    UTI (urinary tract infection)     frequent      Past Surgical History:   Procedure Laterality Date    COLONOSCOPY N/A 8/27/2021    COLONOSCOPY DIAGNOSTIC performed by Mike Hall MD at [address] [place] [address]      with endometrial ablation    OTHER SURGICAL HISTORY Right 12/19/2016    right sided lumbar facet steroid injection    OTHER SURGICAL HISTORY

## 2023-09-18 DIAGNOSIS — M54.2 NECK PAIN, MUSCULOSKELETAL: ICD-10-CM

## 2023-09-19 ENCOUNTER — OFFICE VISIT (OUTPATIENT)
Dept: PRIMARY CARE CLINIC | Age: 43
End: 2023-09-19
Payer: COMMERCIAL

## 2023-09-19 VITALS
OXYGEN SATURATION: 97 % | SYSTOLIC BLOOD PRESSURE: 115 MMHG | BODY MASS INDEX: 23.36 KG/M2 | DIASTOLIC BLOOD PRESSURE: 74 MMHG | HEIGHT: 60 IN | HEART RATE: 68 BPM | WEIGHT: 119 LBS

## 2023-09-19 DIAGNOSIS — M50.30 DEGENERATIVE DISC DISEASE, CERVICAL: ICD-10-CM

## 2023-09-19 DIAGNOSIS — M54.2 NECK PAIN, MUSCULOSKELETAL: Primary | ICD-10-CM

## 2023-09-19 PROCEDURE — 4004F PT TOBACCO SCREEN RCVD TLK: CPT | Performed by: PHYSICIAN ASSISTANT

## 2023-09-19 PROCEDURE — G8427 DOCREV CUR MEDS BY ELIG CLIN: HCPCS | Performed by: PHYSICIAN ASSISTANT

## 2023-09-19 PROCEDURE — 99214 OFFICE O/P EST MOD 30 MIN: CPT | Performed by: PHYSICIAN ASSISTANT

## 2023-09-19 PROCEDURE — G8420 CALC BMI NORM PARAMETERS: HCPCS | Performed by: PHYSICIAN ASSISTANT

## 2023-09-19 RX ORDER — CYCLOBENZAPRINE HCL 10 MG
10 TABLET ORAL 3 TIMES DAILY PRN
Qty: 30 TABLET | Refills: 0 | Status: SHIPPED | OUTPATIENT
Start: 2023-09-19 | End: 2023-09-29

## 2023-09-19 NOTE — PROGRESS NOTES
regular rhythm. Heart sounds: Normal heart sounds. No murmur heard. No friction rub. No gallop. Pulmonary:      Effort: Pulmonary effort is normal. No respiratory distress. Breath sounds: Normal breath sounds. No wheezing or rales. Chest:      Chest wall: No tenderness. Abdominal:      General: Bowel sounds are normal. There is no distension. Palpations: Abdomen is soft. There is no mass. Tenderness: There is no abdominal tenderness. There is no guarding or rebound. Musculoskeletal:         General: Tenderness (right cervical paraspinal muscle) present. Normal range of motion. Cervical back: Normal range of motion. Right lower leg: No edema. Left lower leg: No edema. Skin:     General: Skin is warm. Findings: No rash. Neurological:      Mental Status: She is alert and oriented to person, place, and time. Motor: No weakness or abnormal muscle tone. Psychiatric:         Mood and Affect: Mood normal.         Thought Content: Thought content normal.         Assessment:       Diagnosis Orders   1. Neck pain, musculoskeletal  MRI CERVICAL SPINE WO CONTRAST    External Referral To Physical Therapy    [facility]      2. Degenerative disc disease, cervical  MRI CERVICAL SPINE WO CONTRAST    External Referral To Physical Therapy    [facility]           Plan:    Recommend pain management  MRI of c spine   Start Flexeril and  Voltaren  Will have Prevnar 20 next visit. Return in about 6 weeks (around 10/31/2023) for Well woman.     Orders Placed This Encounter   Procedures    MRI CERVICAL SPINE WO CONTRAST     Standing Status:   Future     Standing Expiration Date:   9/18/2024    External Referral To Physical Therapy     Referral Priority:   Routine     Referral Type:   Eval and Treat     Referral Reason:   Specialty Services Required     Requested Specialty:   Physical Therapist     Number of Visits Requested:   1

## 2023-09-21 DIAGNOSIS — J45.40 MODERATE PERSISTENT ASTHMA WITHOUT COMPLICATION: ICD-10-CM

## 2023-09-21 RX ORDER — MONTELUKAST SODIUM 10 MG/1
10 TABLET ORAL NIGHTLY
Qty: 90 TABLET | Refills: 1 | OUTPATIENT
Start: 2023-09-21

## 2023-09-21 NOTE — TELEPHONE ENCOUNTER
LAST VISIT: 11/22/22 as a new patient  No follow up, patient was a no show to last visit. Medication refused with note to pharmacy that patient needs an appointment. Please make any changes needed. Thank you.

## 2023-09-29 ENCOUNTER — TELEPHONE (OUTPATIENT)
Dept: PRIMARY CARE CLINIC | Age: 43
End: 2023-09-29

## 2023-09-29 NOTE — TELEPHONE ENCOUNTER
Willma Ghent calling stating that the patient has an mri scheduled for Monday and insurance is denying the mri, they would like 6 weeks of PT or chiropractic. They stated there is a peer to peer that expires Oct 5. [phone] and the tracking is [phone]. If not approved by 2pm today they will call the patient and reschedule.

## 2023-09-29 NOTE — TELEPHONE ENCOUNTER
Pt called requesting an arm sling or something to help take the pressure off the nerves in her neck.

## 2023-09-29 NOTE — TELEPHONE ENCOUNTER
Ok to give script for sling but let her know that it might worsen her neck pain. Also let her know that she should take 2 weeks off work to rest and go to PT. See message regarding MRI of cspine.

## 2023-09-29 NOTE — TELEPHONE ENCOUNTER
Please advise patient that she needs to complete 6 weeks of the PT  or chiropractic to get her MRI covered.

## 2023-10-02 ENCOUNTER — TELEPHONE (OUTPATIENT)
Dept: PRIMARY CARE CLINIC | Age: 43
End: 2023-10-02

## 2023-10-02 DIAGNOSIS — M54.2 NECK PAIN, MUSCULOSKELETAL: Primary | ICD-10-CM

## 2023-10-02 DIAGNOSIS — R20.2 PARESTHESIA OF RIGHT ARM: ICD-10-CM

## 2023-10-02 DIAGNOSIS — M25.511 ACUTE PAIN OF RIGHT SHOULDER: ICD-10-CM

## 2023-10-02 DIAGNOSIS — M50.30 DEGENERATIVE DISC DISEASE, CERVICAL: ICD-10-CM

## 2023-10-02 NOTE — TELEPHONE ENCOUNTER
Pt asking for her restriction work note to be extended until 11/1/2023 with no pushing or pulling added to it.   If approved fax to [phone]  Let pt know @ [phone]

## 2023-10-03 ENCOUNTER — TELEPHONE (OUTPATIENT)
Dept: PRIMARY CARE CLINIC | Age: 43
End: 2023-10-03

## 2023-10-03 NOTE — TELEPHONE ENCOUNTER
Please refer to Medical records and ask Cristina Pruitt if the updated restriction letter goes to this person.

## 2023-10-03 NOTE — TELEPHONE ENCOUNTER
Routing to [facility]. It states its from workers comp but the office does not do that.

## 2023-10-03 NOTE — TELEPHONE ENCOUNTER
----- Message from Chemo Alvarez sent at 10/2/2023  9:15 AM EDT -----  Subject: Message to Provider    QUESTIONS  Information for Provider? Anderson Toro is calling from [facility] about   patient claim, they sent over medical records request on 9/25 and 9/19 for   office visit notes. Needing to know if she released back to work or still   has restrictions. When calling number extenstion is [phone]  ---------------------------------------------------------------------------  --------------  [employer]  [phone]; OK to leave message on voicemail  ---------------------------------------------------------------------------  --------------  SCRIPT ANSWERS  Relationship to Patient? Covered Entity  Covered Entity Type? Other  Other Covered Entity Type? Worker comp rep  Representative Name?  Anderson Toro

## 2023-10-12 RX ORDER — ROSUVASTATIN CALCIUM 5 MG/1
5 TABLET, COATED ORAL DAILY
Qty: 90 TABLET | Refills: 0 | Status: SHIPPED | OUTPATIENT
Start: 2023-10-12

## 2023-10-13 ENCOUNTER — INITIAL CONSULT (OUTPATIENT)
Dept: PAIN MANAGEMENT | Age: 43
End: 2023-10-13
Payer: COMMERCIAL

## 2023-10-13 VITALS — HEIGHT: 60 IN | BODY MASS INDEX: 23.36 KG/M2 | WEIGHT: 119 LBS

## 2023-10-13 DIAGNOSIS — M54.12 CERVICAL RADICULOPATHY: Primary | ICD-10-CM

## 2023-10-13 DIAGNOSIS — M47.812 CERVICAL SPONDYLOSIS: ICD-10-CM

## 2023-10-13 PROCEDURE — 4004F PT TOBACCO SCREEN RCVD TLK: CPT | Performed by: PAIN MEDICINE

## 2023-10-13 PROCEDURE — G8427 DOCREV CUR MEDS BY ELIG CLIN: HCPCS | Performed by: PAIN MEDICINE

## 2023-10-13 PROCEDURE — 99203 OFFICE O/P NEW LOW 30 MIN: CPT | Performed by: PAIN MEDICINE

## 2023-10-13 PROCEDURE — G8484 FLU IMMUNIZE NO ADMIN: HCPCS | Performed by: PAIN MEDICINE

## 2023-10-13 PROCEDURE — G8420 CALC BMI NORM PARAMETERS: HCPCS | Performed by: PAIN MEDICINE

## 2023-10-13 NOTE — PROGRESS NOTES
HPI:     Neck Pain   This is a chronic problem. The current episode started more than 1 year ago. The problem occurs constantly. The problem has been unchanged. The pain is associated with lifting a heavy object. The pain is present in the right side. The quality of the pain is described as stabbing and burning. The pain is at a severity of 5/10. The pain is moderate. The symptoms are aggravated by bending and stress. The pain is Worse during the night. Stiffness is present All day. Associated symptoms include numbness and tingling. Pertinent negatives include no headaches. She has tried heat and home exercises for the symptoms. The treatment provided no relief. Neck pain since a work injury several months ago. Pain in the neck into the shoulders with complaints of numbness into her hand. X-ray of the cervical spine with degenerative changes. She has done physical therapy with no benefit. Has been to a chiropractor chiropractor, this made things worse. Patient denies any new neurological symptoms. No bowel or bladder incontinence, no weakness, and no falling. Review of [place] does not show any aberrant prescription behavior.      Past Medical History:   Diagnosis Date    Abdominal pain     feels crampy/colicky    Abnormal CT of the abdomen     Acute upper respiratory infection     Arthritis     OA in lower back     Asthma     Coccyx contusion     Fractured coccyx ([address])     H/O acute pharyngitis     H/O acute sinusitis     H/O contact dermatitis     H/O nausea and vomiting     H/O pyelonephritis     Laryngitis 08/2021    hopitalized in [facility] for 4-5 days for trouble breathing, intubated for 4 hours    Lump of skin     Sciatica     right side    SOB (shortness of breath) on exertion 08/24/2021    UTI (urinary tract infection)     frequent       Past Surgical History:   Procedure Laterality Date    COLONOSCOPY N/A 8/27/2021    COLONOSCOPY DIAGNOSTIC performed by Farida Badillo MD at [facility]

## 2023-10-23 ENCOUNTER — TELEPHONE (OUTPATIENT)
Dept: PRIMARY CARE CLINIC | Age: 43
End: 2023-10-23

## 2023-10-23 NOTE — TELEPHONE ENCOUNTER
Pt asking if she can have a note stating that she can only work 3 days a week? Having to call off q o d  anyway. Doesn't want to lose her job. Has appt with you on 11/14. See's pain M-ment on 11/13. Also, having the MRI done this Friday at [facility]. Will have fax # when we call her back.

## 2023-10-25 ENCOUNTER — TELEPHONE (OUTPATIENT)
Dept: PRIMARY CARE CLINIC | Age: 43
End: 2023-10-25

## 2023-10-25 ENCOUNTER — OFFICE VISIT (OUTPATIENT)
Dept: FAMILY MEDICINE CLINIC | Age: 43
End: 2023-10-25
Payer: COMMERCIAL

## 2023-10-25 VITALS
OXYGEN SATURATION: 96 % | BODY MASS INDEX: 37 KG/M2 | HEART RATE: 74 BPM | WEIGHT: 208.8 LBS | DIASTOLIC BLOOD PRESSURE: 74 MMHG | RESPIRATION RATE: 16 BRPM | HEIGHT: 63 IN | SYSTOLIC BLOOD PRESSURE: 112 MMHG | TEMPERATURE: 97.1 F

## 2023-10-25 DIAGNOSIS — Z00.00 ANNUAL PHYSICAL EXAM: ICD-10-CM

## 2023-10-25 DIAGNOSIS — Z00.00 ANNUAL PHYSICAL EXAM: Primary | ICD-10-CM

## 2023-10-25 DIAGNOSIS — I10 PRIMARY HYPERTENSION: ICD-10-CM

## 2023-10-25 DIAGNOSIS — E78.2 MIXED HYPERLIPIDEMIA: ICD-10-CM

## 2023-10-25 DIAGNOSIS — G47.33 OSA (OBSTRUCTIVE SLEEP APNEA): ICD-10-CM

## 2023-10-25 DIAGNOSIS — Z12.31 ENCOUNTER FOR SCREENING MAMMOGRAM FOR MALIGNANT NEOPLASM OF BREAST: ICD-10-CM

## 2023-10-25 DIAGNOSIS — R73.01 IMPAIRED FASTING GLUCOSE: ICD-10-CM

## 2023-10-25 LAB
ALBUMIN SERPL-MCNC: 4.5 G/DL (ref 3.5–5.2)
ALP BLD-CCNC: 61 U/L (ref 35–104)
ALT SERPL-CCNC: 18 U/L (ref 0–32)
ANION GAP SERPL CALCULATED.3IONS-SCNC: 18 MMOL/L (ref 7–16)
AST SERPL-CCNC: 20 U/L (ref 0–31)
BILIRUB SERPL-MCNC: 0.5 MG/DL (ref 0–1.2)
BUN BLDV-MCNC: 11 MG/DL (ref 6–20)
CALCIUM SERPL-MCNC: 9.4 MG/DL (ref 8.6–10.2)
CHLORIDE BLD-SCNC: 98 MMOL/L (ref 98–107)
CHOLESTEROL: 187 MG/DL
CO2: 22 MMOL/L (ref 22–29)
CREAT SERPL-MCNC: 0.7 MG/DL (ref 0.5–1)
GFR SERPL CREATININE-BSD FRML MDRD: >60 ML/MIN/1.73M2
GLUCOSE BLD-MCNC: 93 MG/DL (ref 74–99)
HBA1C MFR BLD: 5.4 % (ref 4–5.6)
HCT VFR BLD CALC: 40.2 % (ref 34–48)
HDLC SERPL-MCNC: 60 MG/DL
HEMOGLOBIN: 12.9 G/DL (ref 11.5–15.5)
LDL CHOLESTEROL: 108 MG/DL
MCH RBC QN AUTO: 27.3 PG (ref 26–35)
MCHC RBC AUTO-ENTMCNC: 32.1 G/DL (ref 32–34.5)
MCV RBC AUTO: 85 FL (ref 80–99.9)
PDW BLD-RTO: 13.2 % (ref 11.5–15)
PLATELET # BLD: 301 K/UL (ref 130–450)
PMV BLD AUTO: 11 FL (ref 7–12)
POTASSIUM SERPL-SCNC: 4.2 MMOL/L (ref 3.5–5)
RBC # BLD: 4.73 M/UL (ref 3.5–5.5)
SODIUM BLD-SCNC: 138 MMOL/L (ref 132–146)
TOTAL PROTEIN: 7.5 G/DL (ref 6.4–8.3)
TRIGL SERPL-MCNC: 93 MG/DL
TSH SERPL DL<=0.05 MIU/L-ACNC: 1.25 UIU/ML (ref 0.27–4.2)
VLDLC SERPL CALC-MCNC: 19 MG/DL
WBC # BLD: 7.9 K/UL (ref 4.5–11.5)

## 2023-10-25 PROCEDURE — 3074F SYST BP LT 130 MM HG: CPT | Performed by: FAMILY MEDICINE

## 2023-10-25 PROCEDURE — 99396 PREV VISIT EST AGE 40-64: CPT | Performed by: FAMILY MEDICINE

## 2023-10-25 PROCEDURE — 3078F DIAST BP <80 MM HG: CPT | Performed by: FAMILY MEDICINE

## 2023-10-25 PROCEDURE — G8484 FLU IMMUNIZE NO ADMIN: HCPCS | Performed by: FAMILY MEDICINE

## 2023-10-25 RX ORDER — AMOXICILLIN AND CLAVULANATE POTASSIUM 875; 125 MG/1; MG/1
TABLET, FILM COATED ORAL
COMMUNITY
Start: 2023-10-16

## 2023-10-25 NOTE — TELEPHONE ENCOUNTER
Per patient she needs a letter excusing her from work due to her pain. Per patient she usually works 3 days per week but she has been calling off 2 days per week. I explained to patient that this will most likely be an FMLA form that needs to be completed due to the excessive time away, patient is still requesting a letter. Please advise. Also it is okay to message patient via [place]. com. cn, will be unavailable by phone until 3:30 today (10/25).

## 2023-10-25 NOTE — PROGRESS NOTES
Temporal   SpO2: 96%   Weight: 94.7 kg (208 lb 12.8 oz)   Height: 1.6 m (5' 3\")     General:  Patient alert and oriented x 3, NAD, pleasant  HEENT:  Atraumatic, normocephalic, PERRLA, EOMI, clear conjunctiva, TMs clear, nose-clear, throat - no erythema, tonsils- wnl  Neck:  Supple, no goiter, no carotid bruits, no lymphadenopathy  Lungs:  CTA B  Heart:  RRR, no murmurs, gallops or rubs  Abdomen:  Soft, NTND, + bowel sounds  Back: full ROM, no CVA tenderness  Extremities:  No clubbing, cyanosis or edema  Neuro:  CN II-XII grossly intact, 5/5 strength in bilateral upper and lower extremities, 2 + reflexes. Skin: unremarkable    Assessment/Plan:  Leif Park was seen today for annual exam.    Diagnoses and all orders for this visit:    Annual physical exam  -     Comprehensive Metabolic Panel; Future  -     CBC; Future  -     Lipid Panel; Future  -     TSH; Future    Impaired fasting glucose  -     Hemoglobin A1C; Future    Primary hypertension    Mixed hyperlipidemia    YENIFER (obstructive sleep apnea)  -     Baseline Diagnostic Sleep Study; Future    Encounter for screening mammogram for malignant neoplasm of breast  -     DIANDRA DIGITAL SCREEN W OR WO CAD BILATERAL; Future      As above. Call or go to ED immediately if symptoms worsen or persist.  No follow-ups on file. or sooner if necessary. Educational materials and/or home exercises printed for patient's review and were included in patient instructions on his/her After Visit Summary and given to patient at the end of visit. Counseled regarding above diagnosis, including possible risks and complications,  especially if left uncontrolled. Counseled regarding the possible side effects, risks, benefits and alternatives to treatment; patient and/or guardian verbalizes understanding, agrees, feels comfortable with and wishes to proceed with above treatment plan.     Advised patient to call with any new medication issues, and read all Rx info from pharmacy to

## 2023-10-25 NOTE — TELEPHONE ENCOUNTER
----- Message from Ruel Almaraz sent at 10/24/2023 11:19 AM EDT -----  Subject: Message to Provider    QUESTIONS  Information for Provider? Patient is calling in with the fax# [phone]   the office needed to fax paperwork to her employer. ---------------------------------------------------------------------------  --------------  Alivia Pond [employer]  [phone]; OK to leave message on voicemail  ---------------------------------------------------------------------------  --------------  SCRIPT ANSWERS  Relationship to Patient?  Self

## 2023-10-30 NOTE — TELEPHONE ENCOUNTER
Patient picked up note but states it needed restrictions. She will wait until 11/06/23 to discuss with Joesph Hernandez. She does not want any modifications to work note at this time.

## 2023-11-06 ENCOUNTER — OFFICE VISIT (OUTPATIENT)
Dept: PRIMARY CARE CLINIC | Age: 43
End: 2023-11-06
Payer: COMMERCIAL

## 2023-11-06 VITALS
DIASTOLIC BLOOD PRESSURE: 70 MMHG | HEART RATE: 91 BPM | BODY MASS INDEX: 22.81 KG/M2 | HEIGHT: 60 IN | OXYGEN SATURATION: 99 % | SYSTOLIC BLOOD PRESSURE: 114 MMHG | WEIGHT: 116.2 LBS

## 2023-11-06 DIAGNOSIS — M50.30 DEGENERATIVE DISC DISEASE, CERVICAL: ICD-10-CM

## 2023-11-06 DIAGNOSIS — R20.2 PARESTHESIA OF RIGHT ARM: Primary | ICD-10-CM

## 2023-11-06 DIAGNOSIS — M54.2 NECK PAIN: ICD-10-CM

## 2023-11-06 PROCEDURE — 4004F PT TOBACCO SCREEN RCVD TLK: CPT | Performed by: PHYSICIAN ASSISTANT

## 2023-11-06 PROCEDURE — 99213 OFFICE O/P EST LOW 20 MIN: CPT | Performed by: PHYSICIAN ASSISTANT

## 2023-11-06 PROCEDURE — G8427 DOCREV CUR MEDS BY ELIG CLIN: HCPCS | Performed by: PHYSICIAN ASSISTANT

## 2023-11-06 PROCEDURE — G8420 CALC BMI NORM PARAMETERS: HCPCS | Performed by: PHYSICIAN ASSISTANT

## 2023-11-06 PROCEDURE — G8484 FLU IMMUNIZE NO ADMIN: HCPCS | Performed by: PHYSICIAN ASSISTANT

## 2023-11-06 NOTE — PROGRESS NOTES
ablation    OTHER SURGICAL HISTORY Right 12/19/2016    right sided lumbar facet steroid injection    OTHER SURGICAL HISTORY  01/12/2017    lumbar medial nerve block L2-L5    OTHER SURGICAL HISTORY  05/2000    D & C    PARTIAL HYSTERECTOMY (CERVIX NOT REMOVED)      TUBAL LIGATION         Family History   Problem Relation Age of Onset    Heart Disease Mother     High Blood Pressure Mother     High Cholesterol Mother     Arthritis Father     Other Maternal Grandmother         Mantel cell lymphoma    Other Paternal Aunt         oral cancer       Social History     Tobacco Use    Smoking status: Every Day     Packs/day: 0.50     Years: 32.00     Additional pack years: 0.00     Total pack years: 16.00     Types: Cigarettes    Smokeless tobacco: Never   Substance Use Topics    Alcohol use: Not Currently     Alcohol/week: 0.0 standard drinks of alcohol      Current Outpatient Medications   Medication Sig Dispense Refill    diclofenac (VOLTAREN) 50 MG EC tablet Take 1 tablet by mouth 3 times daily (with meals) 90 tablet 0    diclofenac sodium (VOLTAREN) 1 % GEL Apply 2 g topically 4 times daily 50 g 1    SUMAtriptan (IMITREX) 100 MG tablet Take 1 tablet by mouth once as needed for Migraine 10 tablet 1    montelukast (SINGULAIR) 10 MG tablet Take 1 tablet by mouth nightly 90 tablet 1    mometasone-formoterol (DULERA) 100-5 MCG/ACT inhaler Inhale 2 puffs into the lungs 2 times daily 1 each 5    albuterol sulfate HFA (VENTOLIN HFA) 108 (90 Base) MCG/ACT inhaler Inhale 2 puffs into the lungs 4 times daily as needed for Wheezing 1 each 5    albuterol (PROVENTIL) (2.5 MG/3ML) 0.083% nebulizer solution Take 3 mLs by nebulization every 4 hours as needed for Wheezing or Shortness of Breath 120 vial 0     Current Facility-Administered Medications   Medication Dose Route Frequency Provider Last Rate Last Admin    albuterol (PROVENTIL) nebulizer solution 2.5 mg  2.5 mg Nebulization Once Kb Jasso PA-C         Allergies

## 2023-11-08 ENCOUNTER — TELEPHONE (OUTPATIENT)
Dept: PRIMARY CARE CLINIC | Age: 43
End: 2023-11-08

## 2023-11-08 NOTE — TELEPHONE ENCOUNTER
Patient is requesting medication for her cold. Per patient she has been having cough, congestion, runny nose and sinus pressure and would like something sent to the pharmacy. Pharmacy confirmed. Please advise.

## 2023-11-09 ENCOUNTER — OFFICE VISIT (OUTPATIENT)
Dept: PRIMARY CARE CLINIC | Age: 43
End: 2023-11-09
Payer: COMMERCIAL

## 2023-11-09 VITALS
DIASTOLIC BLOOD PRESSURE: 70 MMHG | TEMPERATURE: 98.1 F | BODY MASS INDEX: 22.62 KG/M2 | HEART RATE: 70 BPM | WEIGHT: 115.2 LBS | OXYGEN SATURATION: 98 % | HEIGHT: 60 IN | SYSTOLIC BLOOD PRESSURE: 110 MMHG

## 2023-11-09 DIAGNOSIS — R11.14 BILIOUS VOMITING WITH NAUSEA: ICD-10-CM

## 2023-11-09 DIAGNOSIS — A08.4 VIRAL GASTROENTERITIS: Primary | ICD-10-CM

## 2023-11-09 DIAGNOSIS — R19.7 DIARRHEA OF PRESUMED INFECTIOUS ORIGIN: ICD-10-CM

## 2023-11-09 PROCEDURE — G8484 FLU IMMUNIZE NO ADMIN: HCPCS | Performed by: PHYSICIAN ASSISTANT

## 2023-11-09 PROCEDURE — 4004F PT TOBACCO SCREEN RCVD TLK: CPT | Performed by: PHYSICIAN ASSISTANT

## 2023-11-09 PROCEDURE — 99213 OFFICE O/P EST LOW 20 MIN: CPT | Performed by: PHYSICIAN ASSISTANT

## 2023-11-09 PROCEDURE — G8420 CALC BMI NORM PARAMETERS: HCPCS | Performed by: PHYSICIAN ASSISTANT

## 2023-11-09 PROCEDURE — G8427 DOCREV CUR MEDS BY ELIG CLIN: HCPCS | Performed by: PHYSICIAN ASSISTANT

## 2023-11-09 RX ORDER — ONDANSETRON 4 MG/1
4 TABLET, ORALLY DISINTEGRATING ORAL 3 TIMES DAILY PRN
Qty: 21 TABLET | Refills: 0 | Status: SHIPPED | OUTPATIENT
Start: 2023-11-09

## 2023-11-09 ASSESSMENT — ENCOUNTER SYMPTOMS
VOMITING: 1
DIARRHEA: 1
NAUSEA: 1
RESPIRATORY NEGATIVE: 1

## 2023-11-09 NOTE — PROGRESS NOTES
[facility]  [address]   Phone: [phone]  Fax: [phone]    Dayan Smith is a 36 y.o. female who presents today for her medical conditions/complaintsas noted below. Chief Complaint   Patient presents with    Diarrhea     Wednesday pt stated having diarrhea , pt states everything she eats is running through her , along with chills     Nausea & Vomiting     Tuesday pt started vomiting         HPI:     Diarrhea   Associated symptoms include arthralgias (wednesday morning), chills (at night), myalgias and vomiting (yellow). Pertinent negatives include no fever. Nausea & Vomiting  Associated symptoms include arthralgias (wednesday morning), chills (at night), fatigue, myalgias, nausea and vomiting (yellow). Pertinent negatives include no diaphoresis, fever or rash.          Current Outpatient Medications   Medication Sig Dispense Refill    ondansetron (ZOFRAN-ODT) 4 MG disintegrating tablet Take 1 tablet by mouth 3 times daily as needed for Nausea or Vomiting 21 tablet 0    diclofenac (VOLTAREN) 50 MG EC tablet Take 1 tablet by mouth 3 times daily (with meals) 90 tablet 0    diclofenac sodium (VOLTAREN) 1 % GEL Apply 2 g topically 4 times daily 50 g 1    montelukast (SINGULAIR) 10 MG tablet Take 1 tablet by mouth nightly 90 tablet 1    mometasone-formoterol (DULERA) 100-5 MCG/ACT inhaler Inhale 2 puffs into the lungs 2 times daily 1 each 5    albuterol sulfate HFA (VENTOLIN HFA) 108 (90 Base) MCG/ACT inhaler Inhale 2 puffs into the lungs 4 times daily as needed for Wheezing 1 each 5    albuterol (PROVENTIL) (2.5 MG/3ML) 0.083% nebulizer solution Take 3 mLs by nebulization every 4 hours as needed for Wheezing or Shortness of Breath 120 vial 0    SUMAtriptan (IMITREX) 100 MG tablet Take 1 tablet by mouth once as needed for Migraine 10 tablet 1     Current Facility-Administered Medications   Medication Dose Route Frequency Provider Last Rate Last

## 2023-11-10 ENCOUNTER — TELEPHONE (OUTPATIENT)
Dept: PRIMARY CARE CLINIC | Age: 43
End: 2023-11-10

## 2023-11-10 NOTE — TELEPHONE ENCOUNTER
LAST VISIT:   11/6/2023     Future Appointments   Date Time Provider [address]   11/13/2023 10:15 AM Rickey Lagunas MD [facility] PAIN [facility]   11/14/2023 11:15 AM Narcisa Smallwood PA-C [facility] [address]

## 2023-11-10 NOTE — TELEPHONE ENCOUNTER
Called pt to ask if she still needs her appt on Tuesday 11/14     She was seen twice last week and Monday 11/06 was for the neck f/u

## 2023-11-13 ENCOUNTER — OFFICE VISIT (OUTPATIENT)
Dept: PAIN MANAGEMENT | Age: 43
End: 2023-11-13
Payer: COMMERCIAL

## 2023-11-13 VITALS — BODY MASS INDEX: 22.58 KG/M2 | WEIGHT: 115 LBS | HEIGHT: 60 IN

## 2023-11-13 DIAGNOSIS — M47.812 CERVICAL SPONDYLOSIS: Primary | ICD-10-CM

## 2023-11-13 PROCEDURE — 99213 OFFICE O/P EST LOW 20 MIN: CPT | Performed by: PAIN MEDICINE

## 2023-11-13 PROCEDURE — G8484 FLU IMMUNIZE NO ADMIN: HCPCS | Performed by: PAIN MEDICINE

## 2023-11-13 PROCEDURE — 4004F PT TOBACCO SCREEN RCVD TLK: CPT | Performed by: PAIN MEDICINE

## 2023-11-13 PROCEDURE — G8420 CALC BMI NORM PARAMETERS: HCPCS | Performed by: PAIN MEDICINE

## 2023-11-13 PROCEDURE — G8427 DOCREV CUR MEDS BY ELIG CLIN: HCPCS | Performed by: PAIN MEDICINE

## 2023-11-13 NOTE — PROGRESS NOTES
cancer       Social History     Socioeconomic History    Marital status: Single     Spouse name: Not on file    Number of children: Not on file    Years of education: Not on file    Highest education level: Not on file   Occupational History    Not on file   Tobacco Use    Smoking status: Every Day     Packs/day: 0.50     Years: 32.00     Additional pack years: 0.00     Total pack years: 16.00     Types: Cigarettes    Smokeless tobacco: Never   Vaping Use    Vaping Use: Some days    Passive vaping exposure: Yes   Substance and Sexual Activity    Alcohol use: Not Currently     Alcohol/week: 0.0 standard drinks of alcohol    Drug use: No    Sexual activity: Not on file   Other Topics Concern    Not on file   Social History Narrative    Not on file     Social Determinants of Health     Financial Resource Strain: Low Risk  (8/17/2023)    Overall Financial Resource Strain (CARDIA)     Difficulty of Paying Living Expenses: Not hard at all   Food Insecurity: No Food Insecurity (8/17/2023)    Hunger Vital Sign     Worried About Running Out of Food in the Last Year: Never true     Ran Out of Food in the Last Year: Never true   Transportation Needs: Unknown (8/17/2023)    PRAPARE - Transportation     Lack of Transportation (Medical): Not on file     Lack of Transportation (Non-Medical): No   Physical Activity: Not on file   Stress: Not on file   Social Connections: Not on file   Intimate Partner Violence: Not on file   Housing Stability: Unknown (8/17/2023)    Housing Stability Vital Sign     Unable to Pay for Housing in the Last Year: Not on file     Number of Places Lived in the Last Year: Not on file     Unstable Housing in the Last Year: No       Review of Systems:  Review of Systems   Musculoskeletal:  Positive for neck pain. Neurological:  Negative for headaches. Physical Exam:    Physical Exam  Constitutional:       Appearance: Normal appearance.    Pulmonary:      Effort: Pulmonary effort is normal.

## 2023-11-29 ENCOUNTER — HOSPITAL ENCOUNTER (OUTPATIENT)
Dept: MRI IMAGING | Facility: CLINIC | Age: 43
Discharge: HOME | End: 2023-12-01
Payer: COMMERCIAL

## 2023-11-29 DIAGNOSIS — S13.4XXA WHIPLASH INJURY TO NECK, INITIAL ENCOUNTER: ICD-10-CM

## 2023-11-29 DIAGNOSIS — M47.812 CERVICAL SPONDYLOSIS: ICD-10-CM

## 2023-11-29 DIAGNOSIS — S46.811A TRAUMATIC RUPTURE OF SUPRASPINATUS TENDON OF RIGHT SHOULDER, INITIAL ENCOUNTER: ICD-10-CM

## 2023-11-29 PROCEDURE — 72141 MRI NECK SPINE W/O DYE: CPT

## 2023-12-11 RX ORDER — LEVOTHYROXINE SODIUM 100 MCG
TABLET ORAL
Qty: 90 TABLET | Refills: 1 | Status: SHIPPED | OUTPATIENT
Start: 2023-12-11

## 2024-01-11 RX ORDER — TIZANIDINE 4 MG/1
4 TABLET ORAL NIGHTLY PRN
Qty: 30 TABLET | Refills: 2 | Status: SHIPPED | OUTPATIENT
Start: 2024-01-11

## 2024-01-12 RX ORDER — ROSUVASTATIN CALCIUM 5 MG/1
5 TABLET, COATED ORAL DAILY
Qty: 90 TABLET | Refills: 0 | Status: SHIPPED | OUTPATIENT
Start: 2024-01-12

## 2024-01-13 ENCOUNTER — HOSPITAL ENCOUNTER (EMERGENCY)
Age: 44
Discharge: HOME | End: 2024-01-13
Payer: COMMERCIAL

## 2024-01-13 VITALS — OXYGEN SATURATION: 97 % | RESPIRATION RATE: 20 BRPM | TEMPERATURE: 98.96 F | HEART RATE: 77 BPM

## 2024-01-13 VITALS — OXYGEN SATURATION: 100 % | SYSTOLIC BLOOD PRESSURE: 116 MMHG | DIASTOLIC BLOOD PRESSURE: 85 MMHG

## 2024-01-13 VITALS — DIASTOLIC BLOOD PRESSURE: 49 MMHG | SYSTOLIC BLOOD PRESSURE: 78 MMHG

## 2024-01-13 VITALS
SYSTOLIC BLOOD PRESSURE: 116 MMHG | OXYGEN SATURATION: 99 % | TEMPERATURE: 98.78 F | DIASTOLIC BLOOD PRESSURE: 78 MMHG | RESPIRATION RATE: 18 BRPM | HEART RATE: 90 BPM

## 2024-01-13 VITALS — BODY MASS INDEX: 22.5 KG/M2

## 2024-01-13 VITALS — OXYGEN SATURATION: 99 %

## 2024-01-13 VITALS — SYSTOLIC BLOOD PRESSURE: 127 MMHG | OXYGEN SATURATION: 98 % | DIASTOLIC BLOOD PRESSURE: 80 MMHG

## 2024-01-13 VITALS — DIASTOLIC BLOOD PRESSURE: 51 MMHG | SYSTOLIC BLOOD PRESSURE: 119 MMHG

## 2024-01-13 VITALS — OXYGEN SATURATION: 95 %

## 2024-01-13 VITALS — DIASTOLIC BLOOD PRESSURE: 78 MMHG | SYSTOLIC BLOOD PRESSURE: 116 MMHG

## 2024-01-13 VITALS — OXYGEN SATURATION: 83 %

## 2024-01-13 VITALS — OXYGEN SATURATION: 98 %

## 2024-01-13 VITALS — SYSTOLIC BLOOD PRESSURE: 114 MMHG | DIASTOLIC BLOOD PRESSURE: 60 MMHG

## 2024-01-13 VITALS — OXYGEN SATURATION: 100 %

## 2024-01-13 DIAGNOSIS — B34.9: Primary | ICD-10-CM

## 2024-01-13 DIAGNOSIS — F17.210: ICD-10-CM

## 2024-01-13 PROCEDURE — 96375 TX/PRO/DX INJ NEW DRUG ADDON: CPT

## 2024-01-13 PROCEDURE — 96361 HYDRATE IV INFUSION ADD-ON: CPT

## 2024-01-13 PROCEDURE — 99284 EMERGENCY DEPT VISIT MOD MDM: CPT

## 2024-01-13 PROCEDURE — 96374 THER/PROPH/DIAG INJ IV PUSH: CPT

## 2024-01-13 PROCEDURE — 87804 INFLUENZA ASSAY W/OPTIC: CPT

## 2024-01-13 NOTE — ED.GENADUL1
HPI - General Adult
General
Chief complaint: Upper Respiratory Infection
Stated complaint: flu
Time Seen by Provider: 01/13/24 18:47
Source: patient
Mode of arrival: walk-in
Limitations: no limitations
History of Present Illness
HPI narrative: 
43 year old female presents to the ED for N/V, body aches, fatigue, abd pain. Onset was last night with the body aches. Her son is positive for influenza; she is requesting testing. Denies fever, cough, sore throat, SOB. Denies diarrhea, urinary sx. 
Rates her pain 10/10. Denies chance of pregnancy. She is reporting allergies to Toradol, Norco, and Percocet. She denies being allergic to acetaminophen. She did take Dayquil at 0300 this morning.
Related Data
Previous Rx's

 Medication  Instructions  Recorded
butalbital-acetaminophen-caffeine 1 cap PO Q8H PRN pain, headache 01/13/24
50 mg-300 mg-40 mg capsule #12 caps 
(Fioricet)  
ondansetron 4 mg disintegrating 4 mg PO Q8H PRN nausea and 01/13/24
tablet vomiting 4 days #12 tabs 


Allergies

Allergy/AdvReac Type Severity Reaction Status Date / Time
acetaminophen [From Percocet] Allergy Intermediate  Verified 01/13/24 18:40
ciprofloxacin [From Cipro] Allergy Intermediate  Verified 01/13/24 18:40
hydrocodone [From Vicodin] Allergy Intermediate  Verified 01/13/24 18:40
ketorolac [From Toradol] Allergy Intermediate  Verified 01/13/24 18:40
oxycodone [From Percocet] Allergy Intermediate  Verified 01/13/24 18:40



Review of Systems
ROS  
 Constitutional Reports: chills; Denies: fever   
 Ears, nose, mouth, and throat Denies: throat pain or neck pain   
 Cardiovascular Denies: chest pain   
 Respiratory Denies: shortness of breath or cough   
 Gastrointestinal Reports: abdominal pain, nausea and vomiting; Denies: diarrhea   
 Genitourinary Denies: painful urination, urinary frequency, urinary urgency or blood in urine   
 Musculoskeletal Reports: muscle cramps   
 Integumentary/Breast Denies: rash or itching   
 Neurological Reports: headache, numbness in extremities, weakness in extremities, lack of coordination and dizziness   

PFSH
PFSH
Social History
Smoking status:  Current every day smoker 



Exam
Constitutional
Vital Signs, click to edit/add: 

Last Vital Signs

Temp  98.9 F   01/13/24 19:41
Pulse  77   01/13/24 19:41
Resp  20   01/13/24 19:41
BP  114/60   01/13/24 20:30
Pulse Ox  83 L  01/13/24 20:10
O2 Del Method  Room Air  01/13/24 18:37



Common normals: oriented x3
General appearance: cooperative; 
not ill appearing
HENMT
Nose: external nose normal
External ear: external ears normal
Mouth: oral and palatal mucosa normal and lip normal
Throat: posterior oropharynx normal and uvula midline; 
no uvular edema
Eye
Common normals: conjunctivae normal and no scleral icterus
Neck & C-Spine
Common normals: supple
Chest
Chest: symmetrical chest wall rise
Respiratory
Common normals: normal respiratory effort
Effort & inspection: symmetric chest movement
Auscultation: clear to auscultation bilaterally
Cardio
Common normals: regular rate and regular rhythm
GI
Common normals: Normal to inspection, nondistended, normoactive bowel sounds present, soft to palpation and non-tender
Neuro
Common normals: oriented x3
Sensorium/orientation: awake and alert
Speech: speech normal

Course
Vital Signs
Vital signs: 

Vital Signs

Blood Pressure  116/78   01/13/24 18:36



Temperature  98.9 F   01/13/24 19:41
Pulse Rate  77   01/13/24 19:41
Respiratory Rate  20   01/13/24 19:41
Blood Pressure  114/60   01/13/24 20:30
Pulse Oximetry  83 L  01/13/24 20:10
Oxygen Delivery Method  Room Air  01/13/24 18:37




Medical Decision Making
MDM Narrative
Medical decision making narrative: 
The patient was given medication with improvement in her symptoms. Prescriptions were provided for Zofran and Fioricet. Follow up with pcp for a recheck, further evaluation and treatment. Return to ED if condition worsens. She was discharged to her 
ride home.
Medical Records
Medical records reviewed: Yes I reviewed the patient's medical records
Lab Data
Lab results reviewed: Yes I reviewed the patient's lab results
Labs: 

Lab Results

  01/13/24 Range/Units
  19:05 
Influenza Type A Ag  Negative  
Influenza Type B Ag  Negative  




Discharge Plan
Discharge
Chief Complaint: Upper Respiratory Infection

Clinical Impression:
 Viral illness


Patient Disposition: Home, Self-Care

Time of Disposition Decision: 20:43

Condition: Good

Mode of Transportation: Private Vehicle

Prescriptions / Home Meds:
New
  ondansetron 4 mg tablet,disintegrating 
   4 mg PO Q8H PRN (Reason: nausea and vomiting) 4 Days Qty: 12 0RF
  butalbital-acetaminophen-caff [Fioricet] -40 mg capsule 
   1 cap PO Q8H PRN (Reason: pain, headache) Qty: 12 0RF

Instructions:  Acute Nausea and Vomiting (ED), Viral Syndrome (ED), Abdominal Pain (ED)

Stand Alone Forms:  Portal Instructions

Referrals:
Physician,Non-Staff, MD [Primary Care Provider] - 1 week

Discharge Date/Time: 01/13/24 20:55

## 2024-01-15 ENCOUNTER — OFFICE VISIT (OUTPATIENT)
Dept: PRIMARY CARE CLINIC | Age: 44
End: 2024-01-15
Payer: COMMERCIAL

## 2024-01-15 VITALS
SYSTOLIC BLOOD PRESSURE: 108 MMHG | DIASTOLIC BLOOD PRESSURE: 60 MMHG | WEIGHT: 117.4 LBS | OXYGEN SATURATION: 98 % | HEIGHT: 60 IN | TEMPERATURE: 97.6 F | BODY MASS INDEX: 23.05 KG/M2 | HEART RATE: 61 BPM

## 2024-01-15 DIAGNOSIS — R19.7 DIARRHEA OF PRESUMED INFECTIOUS ORIGIN: ICD-10-CM

## 2024-01-15 DIAGNOSIS — B34.9 VIRAL INFECTION: Primary | ICD-10-CM

## 2024-01-15 DIAGNOSIS — R82.90 BAD ODOR OF URINE: ICD-10-CM

## 2024-01-15 LAB
BILIRUBIN, POC: NORMAL
BLOOD URINE, POC: NORMAL
CLARITY, POC: NORMAL
COLOR, POC: NORMAL
GLUCOSE URINE, POC: NORMAL
KETONES, POC: NORMAL
LEUKOCYTE EST, POC: NORMAL
NITRITE, POC: NORMAL
PH, POC: 6
PROTEIN, POC: NORMAL
SPECIFIC GRAVITY, POC: 1.02
UROBILINOGEN, POC: NORMAL

## 2024-01-15 PROCEDURE — G8427 DOCREV CUR MEDS BY ELIG CLIN: HCPCS | Performed by: PHYSICIAN ASSISTANT

## 2024-01-15 PROCEDURE — 81003 URINALYSIS AUTO W/O SCOPE: CPT | Performed by: PHYSICIAN ASSISTANT

## 2024-01-15 PROCEDURE — 4004F PT TOBACCO SCREEN RCVD TLK: CPT | Performed by: PHYSICIAN ASSISTANT

## 2024-01-15 PROCEDURE — G8420 CALC BMI NORM PARAMETERS: HCPCS | Performed by: PHYSICIAN ASSISTANT

## 2024-01-15 PROCEDURE — 99213 OFFICE O/P EST LOW 20 MIN: CPT | Performed by: PHYSICIAN ASSISTANT

## 2024-01-15 PROCEDURE — G8484 FLU IMMUNIZE NO ADMIN: HCPCS | Performed by: PHYSICIAN ASSISTANT

## 2024-01-15 ASSESSMENT — ENCOUNTER SYMPTOMS
VOMITING: 0
NAUSEA: 1
COUGH: 0
ABDOMINAL PAIN: 0
DIARRHEA: 1
BACK PAIN: 0

## 2024-01-15 ASSESSMENT — PATIENT HEALTH QUESTIONNAIRE - PHQ9
SUM OF ALL RESPONSES TO PHQ QUESTIONS 1-9: 0
SUM OF ALL RESPONSES TO PHQ QUESTIONS 1-9: 0
SUM OF ALL RESPONSES TO PHQ9 QUESTIONS 1 & 2: 0
2. FEELING DOWN, DEPRESSED OR HOPELESS: 0
SUM OF ALL RESPONSES TO PHQ QUESTIONS 1-9: 0
SUM OF ALL RESPONSES TO PHQ QUESTIONS 1-9: 0
1. LITTLE INTEREST OR PLEASURE IN DOING THINGS: 0

## 2024-01-15 NOTE — PROGRESS NOTES
[facility]  [address]  [place] [address]  Phone: [phone]  Fax: [phone]    Palmira Treviño is a 43 y.o. female who presents today for her medical conditions/complaintsas noted below.  Chief Complaint   Patient presents with    Diarrhea     Pt is here for a sick call -- pt state she had diarrhea since 01/12  Went to the ER 01/13 and was stated for flu -- negative     Urinary Tract Infection     Concerns of UTI         HPI:     HPI  Was seen in [facility] ER for cold sxs with body aches.  Diagnosed with URI    She now presents with nausea/diarrhea and urinary tract infection symptoms. Taking Zofran for nausea    Current Outpatient Medications   Medication Sig Dispense Refill    tiZANidine (ZANAFLEX) 4 MG tablet Take 1 tablet by mouth nightly as needed (muscle spasms) 30 tablet 2    diclofenac (VOLTAREN) 50 MG EC tablet TAKE 1 TABLET BY MOUTH 3 TIMES DAILY WITH MEALS. 90 tablet 2    diclofenac sodium (VOLTAREN) 1 % GEL Apply 2 g topically 4 times daily 50 g 1    SUMAtriptan (IMITREX) 100 MG tablet Take 1 tablet by mouth once as needed for Migraine 10 tablet 1    montelukast (SINGULAIR) 10 MG tablet Take 1 tablet by mouth nightly 90 tablet 1    mometasone-formoterol (DULERA) 100-5 MCG/ACT inhaler Inhale 2 puffs into the lungs 2 times daily 1 each 5    albuterol sulfate HFA (VENTOLIN HFA) 108 (90 Base) MCG/ACT inhaler Inhale 2 puffs into the lungs 4 times daily as needed for Wheezing 1 each 5    albuterol (PROVENTIL) (2.5 MG/3ML) 0.083% nebulizer solution Take 3 mLs by nebulization every 4 hours as needed for Wheezing or Shortness of Breath 120 vial 0    ondansetron (ZOFRAN-ODT) 4 MG disintegrating tablet Take 1 tablet by mouth 3 times daily as needed for Nausea or Vomiting (Patient not taking: Reported on 1/15/2024) 21 tablet 0     Current Facility-Administered Medications   Medication Dose Route Frequency Provider Last Rate Last Admin    albuterol (PROVENTIL)

## 2024-01-18 ENCOUNTER — OFFICE VISIT (OUTPATIENT)
Dept: PRIMARY CARE CLINIC | Age: 44
End: 2024-01-18
Payer: COMMERCIAL

## 2024-01-18 VITALS
HEART RATE: 57 BPM | OXYGEN SATURATION: 98 % | HEIGHT: 60 IN | TEMPERATURE: 97.6 F | SYSTOLIC BLOOD PRESSURE: 102 MMHG | DIASTOLIC BLOOD PRESSURE: 60 MMHG | BODY MASS INDEX: 22.93 KG/M2

## 2024-01-18 DIAGNOSIS — J45.41 MODERATE PERSISTENT ASTHMA WITH EXACERBATION: Primary | ICD-10-CM

## 2024-01-18 DIAGNOSIS — R19.7 DIARRHEA, UNSPECIFIED TYPE: ICD-10-CM

## 2024-01-18 DIAGNOSIS — R10.13 EPIGASTRIC PAIN: ICD-10-CM

## 2024-01-18 PROCEDURE — 99213 OFFICE O/P EST LOW 20 MIN: CPT | Performed by: PHYSICIAN ASSISTANT

## 2024-01-18 PROCEDURE — G8420 CALC BMI NORM PARAMETERS: HCPCS | Performed by: PHYSICIAN ASSISTANT

## 2024-01-18 PROCEDURE — 4004F PT TOBACCO SCREEN RCVD TLK: CPT | Performed by: PHYSICIAN ASSISTANT

## 2024-01-18 PROCEDURE — G8484 FLU IMMUNIZE NO ADMIN: HCPCS | Performed by: PHYSICIAN ASSISTANT

## 2024-01-18 PROCEDURE — G8427 DOCREV CUR MEDS BY ELIG CLIN: HCPCS | Performed by: PHYSICIAN ASSISTANT

## 2024-01-18 RX ORDER — TRIAMCINOLONE ACETONIDE 40 MG/ML
20 INJECTION, SUSPENSION INTRA-ARTICULAR; INTRAMUSCULAR ONCE
Status: COMPLETED | OUTPATIENT
Start: 2024-01-18 | End: 2024-01-18

## 2024-01-18 RX ORDER — OMEPRAZOLE 20 MG/1
20 CAPSULE, DELAYED RELEASE ORAL
Qty: 30 CAPSULE | Refills: 1 | Status: SHIPPED | OUTPATIENT
Start: 2024-01-18

## 2024-01-18 RX ORDER — CEFUROXIME AXETIL 250 MG/1
250 TABLET ORAL 2 TIMES DAILY
Qty: 20 TABLET | Refills: 0 | Status: SHIPPED | OUTPATIENT
Start: 2024-01-18 | End: 2024-01-28

## 2024-01-18 RX ADMIN — TRIAMCINOLONE ACETONIDE 20 MG: 40 INJECTION, SUSPENSION INTRA-ARTICULAR; INTRAMUSCULAR at 14:19

## 2024-01-18 ASSESSMENT — ENCOUNTER SYMPTOMS
VOMITING: 0
BLOOD IN STOOL: 0
WHEEZING: 1
SHORTNESS OF BREATH: 1
NAUSEA: 1
COUGH: 1
CHEST TIGHTNESS: 1
ABDOMINAL PAIN: 1

## 2024-01-18 NOTE — PROGRESS NOTES
note reviewed.   Constitutional:       Appearance: Normal appearance. She is not ill-appearing.   Cardiovascular:      Rate and Rhythm: Normal rate and regular rhythm.      Heart sounds: Normal heart sounds.   Pulmonary:      Effort: Pulmonary effort is normal.      Breath sounds: Wheezing and rhonchi present. No rales.   Chest:      Chest wall: No tenderness.   Abdominal:      General: Abdomen is flat. Bowel sounds are normal. There is no distension.      Tenderness: There is generalized abdominal tenderness.      Comments: Worst epigastrically   Neurological:      Mental Status: She is alert.         Assessment:       Diagnosis Orders   1. Moderate persistent asthma with exacerbation  triamcinolone acetonide (KENALOG) injection 20 mg      2. Epigastric pain        3. Diarrhea, unspecified type             Plan:    Kenalog 20mg IM  Complete ABX  Continue albuterol nebs at home  Continue taking her Dulera regularly  Immodium as needed for diarrhea.   Hydrate well  Off work today   Omeprazole for epigastric pain  Avoid NSAIDS  Return in about 2 weeks (around 2/1/2024) for recheck.    No orders of the defined types were placed in this encounter.    Orders Placed This Encounter   Medications    omeprazole (PRILOSEC) 20 MG delayed release capsule     Sig: Take 1 capsule by mouth every morning (before breakfast)     Dispense:  30 capsule     Refill:  1    cefUROXime (CEFTIN) 250 MG tablet     Sig: Take 1 tablet by mouth 2 times daily for 10 days     Dispense:  20 tablet     Refill:  0    triamcinolone acetonide (KENALOG) injection 20 mg           Electronically signed by Mitzy Jones 1/18/2024 at 1:55 PM

## 2024-03-12 ENCOUNTER — OFFICE VISIT (OUTPATIENT)
Dept: PRIMARY CARE CLINIC | Age: 44
End: 2024-03-12
Payer: COMMERCIAL

## 2024-03-12 VITALS
HEIGHT: 60 IN | BODY MASS INDEX: 23.16 KG/M2 | HEART RATE: 79 BPM | DIASTOLIC BLOOD PRESSURE: 70 MMHG | WEIGHT: 118 LBS | OXYGEN SATURATION: 97 % | SYSTOLIC BLOOD PRESSURE: 110 MMHG

## 2024-03-12 DIAGNOSIS — S62.653D CLOSED NONDISPLACED FRACTURE OF MIDDLE PHALANX OF LEFT MIDDLE FINGER WITH ROUTINE HEALING, SUBSEQUENT ENCOUNTER: Primary | ICD-10-CM

## 2024-03-12 PROCEDURE — G8420 CALC BMI NORM PARAMETERS: HCPCS | Performed by: PHYSICIAN ASSISTANT

## 2024-03-12 PROCEDURE — G8427 DOCREV CUR MEDS BY ELIG CLIN: HCPCS | Performed by: PHYSICIAN ASSISTANT

## 2024-03-12 PROCEDURE — 4004F PT TOBACCO SCREEN RCVD TLK: CPT | Performed by: PHYSICIAN ASSISTANT

## 2024-03-12 PROCEDURE — 99213 OFFICE O/P EST LOW 20 MIN: CPT | Performed by: PHYSICIAN ASSISTANT

## 2024-03-12 PROCEDURE — G8484 FLU IMMUNIZE NO ADMIN: HCPCS | Performed by: PHYSICIAN ASSISTANT

## 2024-03-12 NOTE — PROGRESS NOTES
[facility]  [facility]  [address]  [place] [address]  Dept: [phone]    Palmira Treviño is a 43 y.o. female who presents today for her medical conditions/complaints as noted below.      Chief Complaint   Patient presents with    Follow-up     Pt is here for a ER f/u- pt went to [facility] 02/28 for a broken middle finger on left hand. Pt broke finger while playing football         HPI:     HPI  Was playing catch with a football and broke 3rd finger. Sounds like it was an impact injury from the football.     Some swelling and pain   Not wearing her splint.     No results found for: \"LDLCHOLESTEROL\", \"LDLCALC\"    (goal LDL is <100)   No results found for: \"AST\", \"ALT\", \"BUN\", \"CR\"  BP Readings from Last 3 Encounters:   03/12/24 110/70   01/18/24 102/60   01/15/24 108/60          (goal 120/80)    Past Medical History:   Diagnosis Date    Abdominal pain     feels crampy/colicky    Abnormal CT of the abdomen     Acute upper respiratory infection     Arthritis     OA in lower back     Asthma     Coccyx contusion     Fractured coccyx (HCC)     H/O acute pharyngitis     H/O acute sinusitis     H/O contact dermatitis     H/O nausea and vomiting     H/O pyelonephritis     Laryngitis 08/2021    hopitalized in [facility] for 4-5 days for trouble breathing, intubated for 4 hours    Lump of skin     Sciatica     right side    SOB (shortness of breath) on exertion 08/24/2021    UTI (urinary tract infection)     frequent      Past Surgical History:   Procedure Laterality Date    COLONOSCOPY N/A 8/27/2021    COLONOSCOPY DIAGNOSTIC performed by Jaime Reynaga MD at [facility]    HYSTEROSCOPY      with endometrial ablation    OTHER SURGICAL HISTORY Right 12/19/2016    right sided lumbar facet steroid injection    OTHER SURGICAL HISTORY  01/12/2017    lumbar medial nerve block L2-L5    OTHER SURGICAL HISTORY  05/2000    D & C    PARTIAL HYSTERECTOMY (CERVIX NOT

## 2024-04-15 RX ORDER — LOSARTAN POTASSIUM AND HYDROCHLOROTHIAZIDE 25; 100 MG/1; MG/1
1 TABLET ORAL DAILY
Qty: 90 TABLET | Refills: 3 | Status: SHIPPED | OUTPATIENT
Start: 2024-04-15

## 2024-04-15 RX ORDER — AMLODIPINE BESYLATE 5 MG/1
5 TABLET ORAL DAILY
Qty: 90 TABLET | Refills: 3 | Status: SHIPPED | OUTPATIENT
Start: 2024-04-15

## 2024-04-15 RX ORDER — ROSUVASTATIN CALCIUM 5 MG/1
5 TABLET, COATED ORAL DAILY
Qty: 90 TABLET | Refills: 0 | Status: SHIPPED | OUTPATIENT
Start: 2024-04-15

## 2024-04-25 ENCOUNTER — OFFICE VISIT (OUTPATIENT)
Dept: FAMILY MEDICINE CLINIC | Age: 44
End: 2024-04-25
Payer: COMMERCIAL

## 2024-04-25 VITALS
HEIGHT: 63 IN | DIASTOLIC BLOOD PRESSURE: 70 MMHG | SYSTOLIC BLOOD PRESSURE: 118 MMHG | BODY MASS INDEX: 39.21 KG/M2 | OXYGEN SATURATION: 97 % | HEART RATE: 70 BPM | WEIGHT: 221.3 LBS | TEMPERATURE: 97.5 F | RESPIRATION RATE: 16 BRPM

## 2024-04-25 DIAGNOSIS — R73.01 IMPAIRED FASTING GLUCOSE: ICD-10-CM

## 2024-04-25 DIAGNOSIS — Z12.31 ENCOUNTER FOR SCREENING MAMMOGRAM FOR MALIGNANT NEOPLASM OF BREAST: ICD-10-CM

## 2024-04-25 DIAGNOSIS — E03.9 HYPOTHYROIDISM, UNSPECIFIED TYPE: ICD-10-CM

## 2024-04-25 DIAGNOSIS — I10 PRIMARY HYPERTENSION: ICD-10-CM

## 2024-04-25 DIAGNOSIS — E78.2 MIXED HYPERLIPIDEMIA: Primary | ICD-10-CM

## 2024-04-25 LAB
HCT VFR BLD CALC: 43 % (ref 34–48)
HEMOGLOBIN: 13.6 G/DL (ref 11.5–15.5)
MCH RBC QN AUTO: 27.3 PG (ref 26–35)
MCHC RBC AUTO-ENTMCNC: 31.6 G/DL (ref 32–34.5)
MCV RBC AUTO: 86.3 FL (ref 80–99.9)
PDW BLD-RTO: 13.2 % (ref 11.5–15)
PLATELET # BLD: 328 K/UL (ref 130–450)
PMV BLD AUTO: 11 FL (ref 7–12)
RBC # BLD: 4.98 M/UL (ref 3.5–5.5)
WBC # BLD: 8.4 K/UL (ref 4.5–11.5)

## 2024-04-25 PROCEDURE — 3074F SYST BP LT 130 MM HG: CPT | Performed by: FAMILY MEDICINE

## 2024-04-25 PROCEDURE — 99214 OFFICE O/P EST MOD 30 MIN: CPT | Performed by: FAMILY MEDICINE

## 2024-04-25 PROCEDURE — G8417 CALC BMI ABV UP PARAM F/U: HCPCS | Performed by: FAMILY MEDICINE

## 2024-04-25 PROCEDURE — 36415 COLL VENOUS BLD VENIPUNCTURE: CPT | Performed by: FAMILY MEDICINE

## 2024-04-25 PROCEDURE — 1036F TOBACCO NON-USER: CPT | Performed by: FAMILY MEDICINE

## 2024-04-25 PROCEDURE — 3078F DIAST BP <80 MM HG: CPT | Performed by: FAMILY MEDICINE

## 2024-04-25 PROCEDURE — G8427 DOCREV CUR MEDS BY ELIG CLIN: HCPCS | Performed by: FAMILY MEDICINE

## 2024-04-25 SDOH — ECONOMIC STABILITY: FOOD INSECURITY: WITHIN THE PAST 12 MONTHS, YOU WORRIED THAT YOUR FOOD WOULD RUN OUT BEFORE YOU GOT MONEY TO BUY MORE.: NEVER TRUE

## 2024-04-25 SDOH — ECONOMIC STABILITY: INCOME INSECURITY: HOW HARD IS IT FOR YOU TO PAY FOR THE VERY BASICS LIKE FOOD, HOUSING, MEDICAL CARE, AND HEATING?: NOT HARD AT ALL

## 2024-04-25 SDOH — ECONOMIC STABILITY: FOOD INSECURITY: WITHIN THE PAST 12 MONTHS, THE FOOD YOU BOUGHT JUST DIDN'T LAST AND YOU DIDN'T HAVE MONEY TO GET MORE.: NEVER TRUE

## 2024-04-25 ASSESSMENT — PATIENT HEALTH QUESTIONNAIRE - PHQ9
2. FEELING DOWN, DEPRESSED OR HOPELESS: NOT AT ALL
SUM OF ALL RESPONSES TO PHQ QUESTIONS 1-9: 0
SUM OF ALL RESPONSES TO PHQ QUESTIONS 1-9: 0
1. LITTLE INTEREST OR PLEASURE IN DOING THINGS: NOT AT ALL
SUM OF ALL RESPONSES TO PHQ QUESTIONS 1-9: 0
SUM OF ALL RESPONSES TO PHQ QUESTIONS 1-9: 0
SUM OF ALL RESPONSES TO PHQ9 QUESTIONS 1 & 2: 0

## 2024-04-25 NOTE — PROGRESS NOTES
unremarkable    Assessment/Plan:      Phyllis was seen today for diabetes.    Diagnoses and all orders for this visit:    Mixed hyperlipidemia  -     CBC; Future  -     Comprehensive Metabolic Panel; Future  -     Lipid Panel; Future  -     TSH; Future    Impaired fasting glucose  -     Hemoglobin A1C; Future    Primary hypertension    Hypothyroidism, unspecified type  -     TSH; Future    Encounter for screening mammogram for malignant neoplasm of breast  -     DIANDRA DIGITAL SCREEN W OR WO CAD BILATERAL; Future      As above.  Call or go to ED immediately if symptoms worsen or persist.  No follow-ups on file., or sooner if necessary.      Educational materials and/or home exercises printed for patient's review and were included in patient instructions on his/her After Visit Summary and given to patient at the end of visit.      Counseled regarding above diagnosis, including possible risks and complications,  especially if left uncontrolled.    Counseled regarding the possible side effects, risks, benefits and alternatives to treatment; patient and/or guardian verbalizes understanding, agrees, feels comfortable with and wishes to proceed with above treatment plan.    Advised patient to call with any new medication issues, and read all Rx info from pharmacy to assure aware of all possible risks and side effects of medication before taking.    Reviewed age and gender appropriate health screening exams and vaccinations.  Advised patient regarding importance of keeping up with recommended health maintenance and to schedule as soon as possible if overdue, as this is important in assessing for undiagnosed pathology, especially cancer, as well as protecting against potentially harmful/life threatening disease.        Patient and/or guardian verbalizes understanding and agrees with above counseling, assessment and plan.    All questions answered.    Stacy Pizarro MD  4/25/2024    I have personally reviewed and updated

## 2024-04-26 LAB
ALBUMIN: 4.6 G/DL (ref 3.5–5.2)
ALP BLD-CCNC: 64 U/L (ref 35–104)
ALT SERPL-CCNC: 24 U/L (ref 0–32)
ANION GAP SERPL CALCULATED.3IONS-SCNC: 21 MMOL/L (ref 7–16)
AST SERPL-CCNC: 30 U/L (ref 0–31)
BILIRUB SERPL-MCNC: 0.6 MG/DL (ref 0–1.2)
BUN BLDV-MCNC: 11 MG/DL (ref 6–20)
CALCIUM SERPL-MCNC: 9.7 MG/DL (ref 8.6–10.2)
CHLORIDE BLD-SCNC: 101 MMOL/L (ref 98–107)
CHOLESTEROL: 176 MG/DL
CO2: 20 MMOL/L (ref 22–29)
CREAT SERPL-MCNC: 0.7 MG/DL (ref 0.5–1)
GFR SERPL CREATININE-BSD FRML MDRD: >90 ML/MIN/1.73M2
GLUCOSE BLD-MCNC: 89 MG/DL (ref 74–99)
HBA1C MFR BLD: 5.9 % (ref 4–5.6)
HDLC SERPL-MCNC: 51 MG/DL
LDL CHOLESTEROL: 96 MG/DL
POTASSIUM SERPL-SCNC: 4.5 MMOL/L (ref 3.5–5)
SODIUM BLD-SCNC: 142 MMOL/L (ref 132–146)
TOTAL PROTEIN: 7.7 G/DL (ref 6.4–8.3)
TRIGL SERPL-MCNC: 147 MG/DL
TSH SERPL DL<=0.05 MIU/L-ACNC: 1.88 UIU/ML (ref 0.27–4.2)
VLDLC SERPL CALC-MCNC: 29 MG/DL

## 2024-06-07 RX ORDER — LEVOTHYROXINE SODIUM 100 MCG
TABLET ORAL
Qty: 90 TABLET | Refills: 1 | Status: SHIPPED | OUTPATIENT
Start: 2024-06-07

## 2024-06-14 ENCOUNTER — HOSPITAL ENCOUNTER (OUTPATIENT)
Dept: MAMMOGRAPHY | Age: 44
Discharge: HOME | End: 2024-06-14
Attending: FAMILY MEDICINE
Payer: COMMERCIAL

## 2024-06-14 VITALS — BODY MASS INDEX: 37.21 KG/M2 | WEIGHT: 210 LBS | HEIGHT: 63 IN

## 2024-06-14 DIAGNOSIS — Z12.31 ENCOUNTER FOR SCREENING MAMMOGRAM FOR MALIGNANT NEOPLASM OF BREAST: ICD-10-CM

## 2024-06-14 PROCEDURE — 77063 BREAST TOMOSYNTHESIS BI: CPT

## 2024-07-19 RX ORDER — ROSUVASTATIN CALCIUM 5 MG/1
5 TABLET, COATED ORAL DAILY
Qty: 90 TABLET | Refills: 0 | Status: SHIPPED | OUTPATIENT
Start: 2024-07-19

## 2024-08-09 ENCOUNTER — TELEPHONE (OUTPATIENT)
Dept: PAIN MANAGEMENT | Age: 44
End: 2024-08-09

## 2024-08-09 NOTE — TELEPHONE ENCOUNTER
Patient is scheduled for OV on 09/09/24 with Dr Lagunas to discuss procedures since we have not seen her since December 2023

## 2024-08-09 NOTE — TELEPHONE ENCOUNTER
Patient called to schedule her procedure with Dr Lagunas. She is asking if the procedure can just be scheduled or does she need to follow up with him prior to.    Please return her call at the earliest convenience to discuss.    Thank you.

## 2024-08-26 ENCOUNTER — TELEPHONE (OUTPATIENT)
Dept: PAIN MANAGEMENT | Age: 44
End: 2024-08-26

## 2024-08-26 NOTE — TELEPHONE ENCOUNTER
Julia calls from [facility] to give notice that patients C9 time can be extended further  in order for patient to have MBB.   She will be faxing over a new request but will need a new end date before sending    P: [phone]   Ext [phone]

## 2024-09-09 ENCOUNTER — OFFICE VISIT (OUTPATIENT)
Dept: PAIN MANAGEMENT | Age: 44
End: 2024-09-09
Payer: COMMERCIAL

## 2024-09-09 VITALS — WEIGHT: 118 LBS | BODY MASS INDEX: 23.05 KG/M2

## 2024-09-09 DIAGNOSIS — M47.817 LUMBOSACRAL SPONDYLOSIS WITHOUT MYELOPATHY: Primary | ICD-10-CM

## 2024-09-09 DIAGNOSIS — M47.812 CERVICAL SPONDYLOSIS: ICD-10-CM

## 2024-09-09 PROCEDURE — G8420 CALC BMI NORM PARAMETERS: HCPCS | Performed by: PAIN MEDICINE

## 2024-09-09 PROCEDURE — G8427 DOCREV CUR MEDS BY ELIG CLIN: HCPCS | Performed by: PAIN MEDICINE

## 2024-09-09 PROCEDURE — 99214 OFFICE O/P EST MOD 30 MIN: CPT | Performed by: PAIN MEDICINE

## 2024-09-09 PROCEDURE — 4004F PT TOBACCO SCREEN RCVD TLK: CPT | Performed by: PAIN MEDICINE

## 2024-10-10 ENCOUNTER — TELEPHONE (OUTPATIENT)
Dept: PAIN MANAGEMENT | Age: 44
End: 2024-10-10

## 2024-10-10 ENCOUNTER — HOSPITAL ENCOUNTER (OUTPATIENT)
Dept: PAIN MANAGEMENT | Facility: CLINIC | Age: 44
Discharge: HOME | End: 2024-10-10

## 2024-10-10 ENCOUNTER — HOSPITAL ENCOUNTER (OUTPATIENT)
Dept: PAIN MANAGEMENT | Facility: CLINIC | Age: 44
Discharge: HOME | End: 2024-10-10
Payer: COMMERCIAL

## 2024-10-10 VITALS
TEMPERATURE: 98 F | HEIGHT: 61 IN | HEART RATE: 58 BPM | BODY MASS INDEX: 22.09 KG/M2 | RESPIRATION RATE: 15 BRPM | SYSTOLIC BLOOD PRESSURE: 101 MMHG | OXYGEN SATURATION: 100 % | WEIGHT: 117 LBS | DIASTOLIC BLOOD PRESSURE: 58 MMHG

## 2024-10-10 DIAGNOSIS — R52 PAIN MANAGEMENT: ICD-10-CM

## 2024-10-10 PROCEDURE — 6360000002 HC RX W HCPCS: Performed by: PAIN MEDICINE

## 2024-10-10 PROCEDURE — 64491 INJ PARAVERT F JNT C/T 2 LEV: CPT

## 2024-10-10 PROCEDURE — 64490 INJ PARAVERT F JNT C/T 1 LEV: CPT

## 2024-10-10 PROCEDURE — 2500000003 HC RX 250 WO HCPCS: Performed by: PAIN MEDICINE

## 2024-10-10 RX ORDER — BUPIVACAINE HYDROCHLORIDE 2.5 MG/ML
INJECTION, SOLUTION EPIDURAL; INFILTRATION; INTRACAUDAL
Status: COMPLETED | OUTPATIENT
Start: 2024-10-10 | End: 2024-10-10

## 2024-10-10 RX ORDER — LIDOCAINE HYDROCHLORIDE 5 MG/ML
INJECTION, SOLUTION INFILTRATION; INTRAVENOUS
Status: COMPLETED | OUTPATIENT
Start: 2024-10-10 | End: 2024-10-10

## 2024-10-10 RX ORDER — MIDAZOLAM HYDROCHLORIDE 2 MG/2ML
INJECTION, SOLUTION INTRAMUSCULAR; INTRAVENOUS
Status: COMPLETED | OUTPATIENT
Start: 2024-10-10 | End: 2024-10-10

## 2024-10-10 RX ADMIN — LIDOCAINE HYDROCHLORIDE 3 ML: 5 INJECTION, SOLUTION INFILTRATION at 10:52

## 2024-10-10 RX ADMIN — MIDAZOLAM HYDROCHLORIDE 1 MG: 1 INJECTION, SOLUTION INTRAMUSCULAR; INTRAVENOUS at 10:47

## 2024-10-10 RX ADMIN — BUPIVACAINE HYDROCHLORIDE 3 ML: 2.5 INJECTION, SOLUTION EPIDURAL; INFILTRATION; INTRACAUDAL; PERINEURAL at 10:53

## 2024-10-10 RX ADMIN — MIDAZOLAM HYDROCHLORIDE 1 MG: 1 INJECTION, SOLUTION INTRAMUSCULAR; INTRAVENOUS at 10:51

## 2024-10-10 ASSESSMENT — PAIN DESCRIPTION - DESCRIPTORS: DESCRIPTORS: STABBING;NUMBNESS

## 2024-10-10 ASSESSMENT — PAIN - FUNCTIONAL ASSESSMENT
PAIN_FUNCTIONAL_ASSESSMENT: PREVENTS OR INTERFERES WITH MANY ACTIVE NOT PASSIVE ACTIVITIES
PAIN_FUNCTIONAL_ASSESSMENT: 0-10
PAIN_FUNCTIONAL_ASSESSMENT: 0-10

## 2024-10-10 NOTE — TELEPHONE ENCOUNTER
S/P: Cervical Facet Nerve Block Multiple Levels: Right C4/5, C5/6       DOS: 10/10/24    Pain  before procedure with activity : 5    Pain after procedure with activity: 8    Activities following procedure include: Shoe shopping, grocery shopping    % of pain relief: 0%    Procedure successful: No    OV scheduled: 10/17/24

## 2024-10-10 NOTE — DISCHARGE INSTRUCTIONS
You have received a sedative/anesthetic therefore you should not consume any alcoholic beverages for 24 hours.  Do not drive or operate machinery for 24 hours.   Do not take a tub bath for 72 hours after procedure (this includes hot tubs).  You may shower, but avoid hot water to injection site.   Avoid strenuous activity TODAY especially if you experience dizziness.   Remove band-aid the next day.    Wash off any residual iodine 24 hours from today.   Do not use heat, heating pad, or any other heating device over the injection site for 3 days after the procedure.    If you experience pain after your procedure, you may continue with your current pain medication as prescribed.  (DO NOT INCREASE YOUR PAIN MEDICATION WITHOUT TALKING TO DOCTOR)  Soreness and pain at injection site is common, may use ice to reduce soreness.    Please complete pain diary as instructed. The office staff will call you to discuss the results of the procedure within 24 hours, please call the office if you do not hear from them.      Call [facility] at [phone] if you experience:   Fever, chills or temperature over 100    Vomiting, headache, persistent stiff neck, nausea or blurred vision   Difficulty urinating or unable to urinate within 8 hours   Increase in weakness, numbness or loss of function of limbs  Increased redness, swelling or drainage at the injection site

## 2024-10-10 NOTE — OP NOTE
Cervical Facet Nerve Block:  SURGEON: Leslie Lagunas MD    PRE-OP DIAGNOSIS:  Cervical spondylosis without myelopathy [M47.812], Cervicalgia [M54.2]    POST-OP DIAGNOSIS: Same.    PROCEDURE PERFORMED: Cervical Facet Nerve Block Multiple Levels: Right C4/5, C5/6    EBL: minimal    Physician confirmed and marked the surgical site.    CONSENT: Patient has undergone the educational process with this procedure, is aware and fully understands the risks involved: potential damage to any and all body organs including possible bleeding, infection, and nerve injury, allergic reaction and headache. Patient also understands that the procedure will be undertaken in a safe, controlled and monitored setting. patient recognizes that the benefits may include relief from pain and reduction in the oral use of medications. Patient agreed to proceed.    Risks, benefits, and alternatives including postponing the procedure were discussed. The patient does wish to proceed with the procedure at this time.      PREP: The patient's neck was prepped with chloroprep and draped appropriately. 0.5% lidocaine was used to used anesthetize the skin and subcutaneous tissue.     PROCEDURE NOTE: 25 gauge spinal needles were advanced under fluoroscopic guidance to the medial branch nerves corresponding to the indicated facet joints. Aspiration was negative. 1 ml of 0.25% Marcaine was then injected to block the facet joints at  Right C4/5, C5/6.    The needle was withdrawn by the physician and the nurse applied a sterile dressing. The patient tolerated the procedure well. No complications occurred. Patient transferred to the recovery room in satisfactory condition. Appropriate written discharge instructions given to the patient.    Leslie Lagunas MD

## 2024-10-10 NOTE — H&P
Pain Pre-Op H&P Note    Leslie Lagunas MD    HPI: Palmira Treviño  presents with neck pain. Wishes to proceed with cervical MBB.    Past Medical History:   Diagnosis Date    Abdominal pain     feels crampy/colicky    Abnormal CT of the abdomen     Acute upper respiratory infection     Arthritis     OA in lower back     Asthma     Coccyx contusion     Fractured coccyx (HCC)     H/O acute pharyngitis     H/O acute sinusitis     H/O contact dermatitis     H/O nausea and vomiting     H/O pyelonephritis     Laryngitis 08/2021    hopitalized in [facility] for 4-5 days for trouble breathing, intubated for 4 hours    Lump of skin     Sciatica     right side    SOB (shortness of breath) on exertion 08/24/2021    UTI (urinary tract infection)     frequent       Past Surgical History:   Procedure Laterality Date    COLONOSCOPY N/A 8/27/2021    COLONOSCOPY DIAGNOSTIC performed by Jaime Reynaga MD at [facility] ENDO    HYSTEROSCOPY      with endometrial ablation    OTHER SURGICAL HISTORY Right 12/19/2016    right sided lumbar facet steroid injection    OTHER SURGICAL HISTORY  01/12/2017    lumbar medial nerve block L2-L5    OTHER SURGICAL HISTORY  05/2000    D & C    PARTIAL HYSTERECTOMY (CERVIX NOT REMOVED)      TUBAL LIGATION         Family History   Problem Relation Age of Onset    Heart Disease Mother     High Blood Pressure Mother     High Cholesterol Mother     Arthritis Father     Other Maternal Grandmother         Mantel cell lymphoma    Other Paternal Aunt         oral cancer       Allergies   Allergen Reactions    Tramadol Other (See Comments) and Hives     Feels intoxicated for a long time  Keeps her up  Feels intoxicated for a long time    Acetaminophen     Hydrocodone     Ketorolac     Oxycodone     Ciprofloxacin     Fentanyl      Patch caused hives to entire area, but may be fentanyl in patch also, not localized hives    Vicodin [Hydrocodone-Acetaminophen] Other (See Comments)     Feels like bugs

## 2024-10-14 RX ORDER — ROSUVASTATIN CALCIUM 5 MG/1
5 TABLET, COATED ORAL DAILY
Qty: 90 TABLET | Refills: 0 | Status: SHIPPED | OUTPATIENT
Start: 2024-10-14

## 2024-10-17 ENCOUNTER — OFFICE VISIT (OUTPATIENT)
Dept: PAIN MANAGEMENT | Age: 44
End: 2024-10-17
Payer: COMMERCIAL

## 2024-10-17 VITALS — BODY MASS INDEX: 22.09 KG/M2 | WEIGHT: 117 LBS | HEIGHT: 61 IN

## 2024-10-17 DIAGNOSIS — M47.812 CERVICAL SPONDYLOSIS: Primary | ICD-10-CM

## 2024-10-17 DIAGNOSIS — M54.12 CERVICAL RADICULOPATHY: ICD-10-CM

## 2024-10-17 PROCEDURE — G8420 CALC BMI NORM PARAMETERS: HCPCS | Performed by: NURSE PRACTITIONER

## 2024-10-17 PROCEDURE — G8484 FLU IMMUNIZE NO ADMIN: HCPCS | Performed by: NURSE PRACTITIONER

## 2024-10-17 PROCEDURE — G8427 DOCREV CUR MEDS BY ELIG CLIN: HCPCS | Performed by: NURSE PRACTITIONER

## 2024-10-17 PROCEDURE — 4004F PT TOBACCO SCREEN RCVD TLK: CPT | Performed by: NURSE PRACTITIONER

## 2024-10-17 PROCEDURE — 99214 OFFICE O/P EST MOD 30 MIN: CPT | Performed by: NURSE PRACTITIONER

## 2024-10-17 ASSESSMENT — ENCOUNTER SYMPTOMS
COUGH: 0
CONSTIPATION: 0
SHORTNESS OF BREATH: 0

## 2024-10-24 ENCOUNTER — OFFICE VISIT (OUTPATIENT)
Dept: FAMILY MEDICINE CLINIC | Age: 44
End: 2024-10-24
Payer: COMMERCIAL

## 2024-10-24 VITALS
WEIGHT: 228 LBS | OXYGEN SATURATION: 97 % | TEMPERATURE: 97.5 F | HEART RATE: 69 BPM | SYSTOLIC BLOOD PRESSURE: 124 MMHG | DIASTOLIC BLOOD PRESSURE: 80 MMHG | RESPIRATION RATE: 16 BRPM | HEIGHT: 63 IN | BODY MASS INDEX: 40.4 KG/M2

## 2024-10-24 DIAGNOSIS — R73.01 IMPAIRED FASTING GLUCOSE: ICD-10-CM

## 2024-10-24 DIAGNOSIS — E78.2 MIXED HYPERLIPIDEMIA: ICD-10-CM

## 2024-10-24 DIAGNOSIS — E03.9 HYPOTHYROIDISM, UNSPECIFIED TYPE: ICD-10-CM

## 2024-10-24 DIAGNOSIS — I10 PRIMARY HYPERTENSION: ICD-10-CM

## 2024-10-24 DIAGNOSIS — E55.9 VITAMIN D DEFICIENCY: ICD-10-CM

## 2024-10-24 DIAGNOSIS — E78.2 MIXED HYPERLIPIDEMIA: Primary | ICD-10-CM

## 2024-10-24 LAB
ALBUMIN: 4.5 G/DL (ref 3.5–5.2)
ALP BLD-CCNC: 68 U/L (ref 35–104)
ALT SERPL-CCNC: 25 U/L (ref 0–32)
ANION GAP SERPL CALCULATED.3IONS-SCNC: 17 MMOL/L (ref 7–16)
AST SERPL-CCNC: 20 U/L (ref 0–31)
BILIRUB SERPL-MCNC: 0.3 MG/DL (ref 0–1.2)
BUN BLDV-MCNC: 10 MG/DL (ref 6–20)
CALCIUM SERPL-MCNC: 9.3 MG/DL (ref 8.6–10.2)
CHLORIDE BLD-SCNC: 104 MMOL/L (ref 98–107)
CHOLESTEROL, TOTAL: 185 MG/DL
CO2: 22 MMOL/L (ref 22–29)
CREAT SERPL-MCNC: 0.7 MG/DL (ref 0.5–1)
GFR, ESTIMATED: >90 ML/MIN/1.73M2
GLUCOSE BLD-MCNC: 90 MG/DL (ref 74–99)
HBA1C MFR BLD: 5.9 % (ref 4–5.6)
HCT VFR BLD CALC: 39.3 % (ref 34–48)
HDLC SERPL-MCNC: 52 MG/DL
HEMOGLOBIN: 12.6 G/DL (ref 11.5–15.5)
LDL CHOLESTEROL: 91 MG/DL
MCH RBC QN AUTO: 27.5 PG (ref 26–35)
MCHC RBC AUTO-ENTMCNC: 32.1 G/DL (ref 32–34.5)
MCV RBC AUTO: 85.6 FL (ref 80–99.9)
PDW BLD-RTO: 13 % (ref 11.5–15)
PLATELET # BLD: 303 K/UL (ref 130–450)
PMV BLD AUTO: 11 FL (ref 7–12)
POTASSIUM SERPL-SCNC: 4.1 MMOL/L (ref 3.5–5)
RBC # BLD: 4.59 M/UL (ref 3.5–5.5)
SODIUM BLD-SCNC: 143 MMOL/L (ref 132–146)
TOTAL PROTEIN: 7.4 G/DL (ref 6.4–8.3)
TRIGL SERPL-MCNC: 212 MG/DL
TSH SERPL DL<=0.05 MIU/L-ACNC: 2.43 UIU/ML (ref 0.27–4.2)
VITAMIN D 25-HYDROXY: 31.9 NG/ML (ref 30–100)
VLDLC SERPL CALC-MCNC: 42 MG/DL
WBC # BLD: 9 K/UL (ref 4.5–11.5)

## 2024-10-24 PROCEDURE — 3074F SYST BP LT 130 MM HG: CPT | Performed by: FAMILY MEDICINE

## 2024-10-24 PROCEDURE — 3079F DIAST BP 80-89 MM HG: CPT | Performed by: FAMILY MEDICINE

## 2024-10-24 PROCEDURE — 1036F TOBACCO NON-USER: CPT | Performed by: FAMILY MEDICINE

## 2024-10-24 PROCEDURE — G8484 FLU IMMUNIZE NO ADMIN: HCPCS | Performed by: FAMILY MEDICINE

## 2024-10-24 PROCEDURE — 99214 OFFICE O/P EST MOD 30 MIN: CPT | Performed by: FAMILY MEDICINE

## 2024-10-24 PROCEDURE — G8427 DOCREV CUR MEDS BY ELIG CLIN: HCPCS | Performed by: FAMILY MEDICINE

## 2024-10-24 PROCEDURE — G8417 CALC BMI ABV UP PARAM F/U: HCPCS | Performed by: FAMILY MEDICINE

## 2024-10-24 NOTE — PROGRESS NOTES
Phyllis Coe (:  1980) is a 43 y.o. female, Established patient, here for evaluation of the following chief complaint(s):  6 Month Follow-Up (Make sure labs are ok and to see if any adjustments need to be made ), Other (Discuss vitamin D ), and Hypertension         Assessment & Plan  1. Hypertension.  Her blood pressure is well-managed with a reading of 124/80 today. She is advised to continue her current medication regimen. Blood work has been ordered for today and for her next visit in six months to monitor her condition.    2. Elevated A1C.  Her last blood work showed an increase in her A1C level to 5.9, which is above the normal range of 5.6 or less. She reports difficulty managing her diet due to a busy schedule. Dietary modifications were suggested, including meal preparation during her free time and opting for healthier food choices. Follow-up blood work has been ordered for today to reassess her A1C levels.    3. Vitamin D Deficiency.  She was advised to take vitamin D supplements, particularly during the winter months, as it is beneficial for bone health. A vitamin D level test has been added to her blood work for today to determine if supplementation is necessary.    4. Health Maintenance.  She is up to date with her mammogram. No additional health maintenance actions are required at this time.      Results    Phyllis was seen today for 6 month follow-up, other and hypertension.    Diagnoses and all orders for this visit:    Mixed hyperlipidemia  -     Comprehensive Metabolic Panel; Future  -     CBC; Future  -     Lipid Panel; Future  -     Comprehensive Metabolic Panel; Future  -     CBC; Future  -     Lipid Panel; Future  -     TSH; Future    Hypothyroidism, unspecified type  -     TSH; Future    Impaired fasting glucose  -     Hemoglobin A1C; Future  -     Hemoglobin A1C; Future    Primary hypertension    Vitamin D deficiency  -     Vitamin D 25 Hydroxy; Future      1. Mixed

## 2024-10-26 NOTE — PROGRESS NOTES
Facility-Administered Medications   Medication Dose Route Frequency Provider Last Rate Last Admin    albuterol (PROVENTIL) nebulizer solution 2.5 mg  2.5 mg Nebulization Once Ina Jackson PA-C         Allergies   Allergen Reactions    Tramadol Other (See Comments) and Hives     Feels intoxicated for a long time  Keeps her up  Feels intoxicated for a long time    Acetaminophen     Hydrocodone     Ketorolac     Oxycodone     Ciprofloxacin     Fentanyl      Patch caused hives to entire area, but may be fentanyl in patch also, not localized hives    Vicodin [Hydrocodone-Acetaminophen] Other (See Comments)     Feels like bugs crawling all over    Adhesive Tape Rash     OK to use paper tape per patient    Percocet [Oxycodone-Acetaminophen] Nausea And Vomiting    Prednisone      Anger and depression episode       Subjective:      Review of Systems   HENT:  Positive for congestion, rhinorrhea and sneezing. Negative for sinus pressure and sore throat.    Respiratory:  Positive for shortness of breath.    Allergic/Immunologic: Positive for environmental allergies.       Objective:     /80   Pulse 88   Wt 51.3 kg (113 lb 3.2 oz)   SpO2 96%   BMI 21.39 kg/m²   Physical Exam  Vitals and nursing note reviewed.   Constitutional:       Appearance: Normal appearance. She is ill-appearing.   HENT:      Right Ear: Tympanic membrane, ear canal and external ear normal.      Left Ear: Tympanic membrane, ear canal and external ear normal.      Nose: Congestion present. No rhinorrhea.   Neurological:      Mental Status: She is alert.         Assessment/Plan:   1. Moderate persistent asthma without complication  -     levalbuterol (XOPENEX HFA) 45 MCG/ACT inhaler; Inhale 1 puff into the lungs every 4 hours as needed for Wheezing, Disp-1 each, R-3Normal  2. Abnormal nasal septum  -     AFL - Kylee Mariano MD, Otolaryngology, Job  3. Upper respiratory tract infection, unspecified type  -     benzonatate (TESSALON) 100

## 2024-10-28 ENCOUNTER — OFFICE VISIT (OUTPATIENT)
Dept: PRIMARY CARE CLINIC | Age: 44
End: 2024-10-28
Payer: COMMERCIAL

## 2024-10-28 VITALS
WEIGHT: 113.2 LBS | OXYGEN SATURATION: 96 % | BODY MASS INDEX: 21.39 KG/M2 | DIASTOLIC BLOOD PRESSURE: 80 MMHG | HEART RATE: 88 BPM | SYSTOLIC BLOOD PRESSURE: 116 MMHG

## 2024-10-28 DIAGNOSIS — J06.9 UPPER RESPIRATORY TRACT INFECTION, UNSPECIFIED TYPE: ICD-10-CM

## 2024-10-28 DIAGNOSIS — J45.40 MODERATE PERSISTENT ASTHMA WITHOUT COMPLICATION: Primary | ICD-10-CM

## 2024-10-28 DIAGNOSIS — J30.9 ALLERGIC RHINITIS, UNSPECIFIED SEASONALITY, UNSPECIFIED TRIGGER: ICD-10-CM

## 2024-10-28 DIAGNOSIS — Q30.9 ABNORMAL NASAL SEPTUM: ICD-10-CM

## 2024-10-28 PROCEDURE — G8428 CUR MEDS NOT DOCUMENT: HCPCS | Performed by: PHYSICIAN ASSISTANT

## 2024-10-28 PROCEDURE — G8484 FLU IMMUNIZE NO ADMIN: HCPCS | Performed by: PHYSICIAN ASSISTANT

## 2024-10-28 PROCEDURE — 99214 OFFICE O/P EST MOD 30 MIN: CPT | Performed by: PHYSICIAN ASSISTANT

## 2024-10-28 PROCEDURE — G8420 CALC BMI NORM PARAMETERS: HCPCS | Performed by: PHYSICIAN ASSISTANT

## 2024-10-28 PROCEDURE — 4004F PT TOBACCO SCREEN RCVD TLK: CPT | Performed by: PHYSICIAN ASSISTANT

## 2024-10-28 RX ORDER — LEVALBUTEROL TARTRATE 45 UG/1
1 AEROSOL, METERED ORAL EVERY 4 HOURS PRN
Qty: 1 EACH | Refills: 3 | Status: SHIPPED | OUTPATIENT
Start: 2024-10-28 | End: 2025-10-28

## 2024-10-28 RX ORDER — BENZONATATE 100 MG/1
100-200 CAPSULE ORAL 3 TIMES DAILY PRN
Qty: 60 CAPSULE | Refills: 0 | Status: SHIPPED | OUTPATIENT
Start: 2024-10-28 | End: 2024-11-04

## 2024-10-28 RX ORDER — LORATADINE PSEUDOEPHEDRINE SULFATE 10; 240 MG/1; MG/1
1 TABLET, EXTENDED RELEASE ORAL DAILY
Qty: 30 TABLET | Refills: 1 | Status: SHIPPED | OUTPATIENT
Start: 2024-10-28 | End: 2024-12-27

## 2024-10-28 SDOH — ECONOMIC STABILITY: FOOD INSECURITY: WITHIN THE PAST 12 MONTHS, THE FOOD YOU BOUGHT JUST DIDN'T LAST AND YOU DIDN'T HAVE MONEY TO GET MORE.: NEVER TRUE

## 2024-10-28 SDOH — ECONOMIC STABILITY: FOOD INSECURITY: WITHIN THE PAST 12 MONTHS, YOU WORRIED THAT YOUR FOOD WOULD RUN OUT BEFORE YOU GOT MONEY TO BUY MORE.: NEVER TRUE

## 2024-10-28 SDOH — ECONOMIC STABILITY: INCOME INSECURITY: HOW HARD IS IT FOR YOU TO PAY FOR THE VERY BASICS LIKE FOOD, HOUSING, MEDICAL CARE, AND HEATING?: NOT HARD AT ALL

## 2024-10-28 ASSESSMENT — ENCOUNTER SYMPTOMS
RHINORRHEA: 1
SINUS PRESSURE: 0
SORE THROAT: 0
SHORTNESS OF BREATH: 1

## 2024-11-25 ENCOUNTER — TELEPHONE (OUTPATIENT)
Dept: PRIMARY CARE CLINIC | Age: 44
End: 2024-11-25

## 2024-11-25 NOTE — TELEPHONE ENCOUNTER
Patient asking to be placed on MA schedule for pneumonia vaccine, ok to do?     Oral Minoxidil Counseling- I discussed with the patient the risks of oral minoxidil including but not limited to shortness of breath, swelling of the feet or ankles, dizziness, lightheadedness, unwanted hair growth and allergic reaction.  The patient verbalized understanding of the proper use and possible adverse effects of oral minoxidil.  All of the patient's questions and concerns were addressed.

## 2024-11-29 ENCOUNTER — TELEPHONE (OUTPATIENT)
Dept: PRIMARY CARE CLINIC | Age: 44
End: 2024-11-29

## 2024-11-29 DIAGNOSIS — Q30.9 ABNORMAL NASAL SEPTUM: Primary | ICD-10-CM

## 2024-11-29 DIAGNOSIS — J30.9 ALLERGIC RHINITIS, UNSPECIFIED SEASONALITY, UNSPECIFIED TRIGGER: ICD-10-CM

## 2024-11-29 NOTE — TELEPHONE ENCOUNTER
Patient asking for a new referral to ENT because the one she was referred to is no longer taking any more new patients

## 2024-12-02 ENCOUNTER — NURSE ONLY (OUTPATIENT)
Dept: PRIMARY CARE CLINIC | Age: 44
End: 2024-12-02
Payer: COMMERCIAL

## 2024-12-02 VITALS — DIASTOLIC BLOOD PRESSURE: 80 MMHG | SYSTOLIC BLOOD PRESSURE: 106 MMHG | HEART RATE: 81 BPM | OXYGEN SATURATION: 97 %

## 2024-12-02 DIAGNOSIS — Z23 IMMUNIZATION DUE: Primary | ICD-10-CM

## 2024-12-02 PROCEDURE — 90471 IMMUNIZATION ADMIN: CPT | Performed by: PHYSICIAN ASSISTANT

## 2024-12-02 PROCEDURE — 90677 PCV20 VACCINE IM: CPT | Performed by: PHYSICIAN ASSISTANT

## 2024-12-02 NOTE — PROGRESS NOTES
After obtaining consent, and per orders of Misbah Almanza PA-C, injection of prevnar 20 given in Right deltoid by JAYESH MAYBERRY MA. Patient tolerated the injection well.

## 2024-12-06 RX ORDER — LEVOTHYROXINE SODIUM 100 MCG
TABLET ORAL
Qty: 90 TABLET | Refills: 1 | Status: SHIPPED | OUTPATIENT
Start: 2024-12-06

## 2025-01-02 ENCOUNTER — PATIENT MESSAGE (OUTPATIENT)
Dept: PRIMARY CARE CLINIC | Age: 45
End: 2025-01-02

## 2025-01-20 ENCOUNTER — OFFICE VISIT (OUTPATIENT)
Dept: PAIN MANAGEMENT | Age: 45
End: 2025-01-20
Payer: COMMERCIAL

## 2025-01-20 VITALS — BODY MASS INDEX: 21.34 KG/M2 | HEIGHT: 61 IN | WEIGHT: 113 LBS

## 2025-01-20 DIAGNOSIS — M47.812 CERVICAL SPONDYLOSIS: Primary | ICD-10-CM

## 2025-01-20 PROCEDURE — G8420 CALC BMI NORM PARAMETERS: HCPCS | Performed by: PAIN MEDICINE

## 2025-01-20 PROCEDURE — G8427 DOCREV CUR MEDS BY ELIG CLIN: HCPCS | Performed by: PAIN MEDICINE

## 2025-01-20 PROCEDURE — 4004F PT TOBACCO SCREEN RCVD TLK: CPT | Performed by: PAIN MEDICINE

## 2025-01-20 PROCEDURE — 99213 OFFICE O/P EST LOW 20 MIN: CPT | Performed by: PAIN MEDICINE

## 2025-01-20 RX ORDER — ROSUVASTATIN CALCIUM 5 MG/1
5 TABLET, COATED ORAL DAILY
Qty: 90 TABLET | Refills: 1 | Status: SHIPPED | OUTPATIENT
Start: 2025-01-20

## 2025-01-20 NOTE — PROGRESS NOTES
Placed This Encounter   Procedures    Og Long MD, Neurosurgery, Job       Assessment:  1. Cervical spondylosis        Treatment Plan:  DISCUSSION: Treatment options discussed with patient and all questions answered to patient's satisfaction.  Risks, benefits, and alternatives of treatment discussed.    OARRS Review: Reviewed and acceptable for medications prescribed.  TREATMENT OPTIONS:     Discussed different treatment options including continued conservative care such as physical therapy, chiropractic care, acupuncture.  Discussed different interventional options such as epidural steroids or medial branch blocks.  Also discussed surgical evaluation.    Wishes to discuss options with surgeon.  [facility] has approved neurosurgical evaluation, will make new referral.    Not interested in epidural steroid injections - she is not interested in steroid based injections       Sheriff Janneth M.D.        I have reviewed the chief complaint and history of present illness (including ROS and PFSH) and vital documentation by my staff and I agree with their documentation and have added where applicable.

## 2025-02-13 ENCOUNTER — TELEPHONE (OUTPATIENT)
Dept: FAMILY MEDICINE CLINIC | Age: 45
End: 2025-02-13

## 2025-04-07 ENCOUNTER — OFFICE VISIT (OUTPATIENT)
Age: 45
End: 2025-04-07
Payer: COMMERCIAL

## 2025-04-07 VITALS
HEART RATE: 70 BPM | DIASTOLIC BLOOD PRESSURE: 72 MMHG | SYSTOLIC BLOOD PRESSURE: 113 MMHG | WEIGHT: 113 LBS | HEIGHT: 61 IN | BODY MASS INDEX: 21.34 KG/M2

## 2025-04-07 DIAGNOSIS — M48.02 FORAMINAL STENOSIS OF CERVICAL REGION: ICD-10-CM

## 2025-04-07 DIAGNOSIS — M50.90 CERVICAL DISC DISEASE: Primary | ICD-10-CM

## 2025-04-07 DIAGNOSIS — M54.12 CERVICAL RADICULOPATHY: ICD-10-CM

## 2025-04-07 PROCEDURE — G8420 CALC BMI NORM PARAMETERS: HCPCS | Performed by: NEUROLOGICAL SURGERY

## 2025-04-07 PROCEDURE — G8428 CUR MEDS NOT DOCUMENT: HCPCS | Performed by: NEUROLOGICAL SURGERY

## 2025-04-07 PROCEDURE — 99204 OFFICE O/P NEW MOD 45 MIN: CPT | Performed by: NEUROLOGICAL SURGERY

## 2025-04-07 PROCEDURE — 4004F PT TOBACCO SCREEN RCVD TLK: CPT | Performed by: NEUROLOGICAL SURGERY

## 2025-04-07 NOTE — PROGRESS NOTES
Review of Systems   Musculoskeletal:  Positive for neck pain and neck stiffness.   All other systems reviewed and are negative.    
review this information as well as discuss any further potential treatment planning as well as the possible option of surgical invention    Followup: No follow-ups on file.    Prescriptions Ordered:  No orders of the defined types were placed in this encounter.       Orders Placed:  Orders Placed This Encounter   Procedures    XR CERVICAL SPINE FLEXION AND EXTENSION     Standing Status:   Future     Expected Date:   4/21/2025     Expiration Date:   4/7/2026    FL MYELOGRAM CERVICAL S&I     Standing Status:   Future     Expected Date:   4/7/2025     Expiration Date:   4/7/2026    CT CERVICAL SPINE W CONTRAST     Standing Status:   Future     Expected Date:   4/7/2025     Expiration Date:   4/7/2026     STAT Creatinine as needed::   Yes       Electronically signed by Og Rodriguez MD on 4/7/2025 at 9:46 AM    Please note that this chart was generated using voice recognition Dragon dictation software.  Although every effort was made to ensure the accuracy of this automated transcription, some errors in transcription may have occurred.

## 2025-04-18 ENCOUNTER — HOSPITAL ENCOUNTER (OUTPATIENT)
Dept: INTERVENTIONAL RADIOLOGY/VASCULAR | Age: 45
Discharge: HOME | End: 2025-04-20
Payer: COMMERCIAL

## 2025-04-18 ENCOUNTER — HOSPITAL ENCOUNTER (OUTPATIENT)
Dept: CT IMAGING | Age: 45
Discharge: HOME | End: 2025-04-20
Payer: COMMERCIAL

## 2025-04-18 VITALS
BODY MASS INDEX: 21.14 KG/M2 | WEIGHT: 112 LBS | RESPIRATION RATE: 16 BRPM | SYSTOLIC BLOOD PRESSURE: 108 MMHG | HEIGHT: 61 IN | HEART RATE: 62 BPM | TEMPERATURE: 97.7 F | DIASTOLIC BLOOD PRESSURE: 67 MMHG | OXYGEN SATURATION: 100 %

## 2025-04-18 DIAGNOSIS — M48.02 FORAMINAL STENOSIS OF CERVICAL REGION: ICD-10-CM

## 2025-04-18 PROCEDURE — 7100000010 HC PHASE II RECOVERY - FIRST 15 MIN: Performed by: RADIOLOGY

## 2025-04-18 PROCEDURE — 72126 CT NECK SPINE W/DYE: CPT

## 2025-04-18 PROCEDURE — 7100000011 HC PHASE II RECOVERY - ADDTL 15 MIN: Performed by: RADIOLOGY

## 2025-04-18 PROCEDURE — 72240 MYELOGRAPHY NECK SPINE: CPT

## 2025-04-18 PROCEDURE — 61055 INJECTION INTO BRAIN CANAL: CPT

## 2025-04-18 PROCEDURE — 6360000004 HC RX CONTRAST MEDICATION: Performed by: RADIOLOGY

## 2025-04-18 PROCEDURE — 2709999900 IR MYELOGRAM CERVICAL

## 2025-04-18 RX ORDER — IOPAMIDOL 612 MG/ML
INJECTION, SOLUTION INTRATHECAL PRN
Status: COMPLETED | OUTPATIENT
Start: 2025-04-18 | End: 2025-04-18

## 2025-04-18 RX ADMIN — IOPAMIDOL 10 ML: 612 INJECTION, SOLUTION INTRATHECAL at 13:44

## 2025-04-18 ASSESSMENT — ENCOUNTER SYMPTOMS
DIARRHEA: 0
WHEEZING: 1
COUGH: 1
VOMITING: 0
SHORTNESS OF BREATH: 1
NAUSEA: 0
CONSTIPATION: 0
SORE THROAT: 0
ABDOMINAL PAIN: 0

## 2025-04-18 ASSESSMENT — PAIN DESCRIPTION - DESCRIPTORS
DESCRIPTORS: ACHING

## 2025-04-18 ASSESSMENT — PAIN DESCRIPTION - PAIN TYPE: TYPE: INTRACTABLE PAIN

## 2025-04-18 ASSESSMENT — PAIN SCALES - GENERAL: PAINLEVEL_OUTOF10: 3

## 2025-04-18 ASSESSMENT — PAIN - FUNCTIONAL ASSESSMENT
PAIN_FUNCTIONAL_ASSESSMENT: 0-10
PAIN_FUNCTIONAL_ASSESSMENT: 0-10

## 2025-04-18 ASSESSMENT — PAIN DESCRIPTION - LOCATION: LOCATION: HEAD

## 2025-04-18 NOTE — OP NOTE
Brief Postoperative Note    Palmira Treviño  YOB: 1980  [id number]    Pre-operative Diagnosis: neck pain    Post-operative Diagnosis: Same    Procedure: cervical myelogram    Anesthesia: Local     Surgeons/Assistants: Olivier Paige MD     Estimated Blood Loss: minimal    Complications: none immediate    Specimens: were not obtained      Electronically signed by Olivier Paige MD on 4/18/2025 at 1:59 PM

## 2025-04-18 NOTE — PROGRESS NOTES
Head of bed raised 1 hour post myelogram. Patient has complained of headache, encouraged PO fluids and instructed patient to use her home headache remedies as per discharge instructions.

## 2025-04-18 NOTE — H&P
HISTORY and PHYSICAL  [facility]       NAME:  Palmira Treviño  MRN: [id number]   YOB: 1980   Date: 4/18/2025   Age: 44 y.o.  Gender: female       COMPLAINT AND PRESENT HISTORY:     Palmira Treviño is 44 y.o.  female, here for IR myelogram cervical related to cervical stenosis.    Below italics a portion of office visit note by Dr. Rodriguez, dated 04/07/25: Reviewed   I had the pleasure seeing this patient in the office today for primary complaints of neck and right upper extremity discomfort as well as right hand numbness. As you know the patient is a 44-year-old female who indicates in 2023 she was at work lifting a 55 pound object when she developed the onset of discomfort. She does not describe any similar discomfort prior to the work-related incident which she has described. The pain originates in her neck coursing into the right shoulder extending in the posterior lateral arm and forearm region ending in numbness and tingling of the lateral 4 digits of her right hand. She describes no symptoms in the left upper extremity. Over the past several years she indicates she has been through physical therapy as well as treatment through pain management without improvement. She continues to experience ongoing discomfort and numbness in the right upper extremity as described above       UPDATE:   Denies changes to symptoms since office visit with Dr. Atkins.     NPO p MN. Used dulera today. Denies taking any blood thinning medications. Denies recent or current chest pain/pressure, palpitations, SOB, recent URI, fever or chills.       RECENT LABS, IMAGING AND TESTING     ntains abnormal data CBC auto differential  Order: [id number]  Component  Ref Range & Units 9/23/24 1339   White Blood Cells  4.0 - 11.0 X10E9/L 9.3   RBC count  3.80 - 5.20 X10E12/L 4.79   Hemoglobin  11.7 - 15.5 g/dL 14.5   Hematocrit  35 - 47 % 42.4   MCV  80 - 100 fL 89   MCH  27 - 34 pg 30.3   MCHC  32 - 36 g/dL

## 2025-04-21 ENCOUNTER — PATIENT MESSAGE (OUTPATIENT)
Dept: PRIMARY CARE CLINIC | Age: 45
End: 2025-04-21

## 2025-04-22 ENCOUNTER — TELEPHONE (OUTPATIENT)
Dept: PRIMARY CARE CLINIC | Age: 45
End: 2025-04-22

## 2025-04-22 ENCOUNTER — HOSPITAL ENCOUNTER (EMERGENCY)
Age: 45
Discharge: HOME | End: 2025-04-22
Attending: STUDENT IN AN ORGANIZED HEALTH CARE EDUCATION/TRAINING PROGRAM
Payer: COMMERCIAL

## 2025-04-22 ENCOUNTER — TELEPHONE (OUTPATIENT)
Age: 45
End: 2025-04-22

## 2025-04-22 VITALS
HEART RATE: 80 BPM | OXYGEN SATURATION: 100 % | RESPIRATION RATE: 19 BRPM | SYSTOLIC BLOOD PRESSURE: 112 MMHG | DIASTOLIC BLOOD PRESSURE: 79 MMHG | TEMPERATURE: 98.4 F

## 2025-04-22 DIAGNOSIS — R51.9 ACUTE NONINTRACTABLE HEADACHE, UNSPECIFIED HEADACHE TYPE: Primary | ICD-10-CM

## 2025-04-22 PROCEDURE — APPSS30 APP SPLIT SHARED TIME 16-30 MINUTES: Performed by: NURSE PRACTITIONER

## 2025-04-22 PROCEDURE — 96375 TX/PRO/DX INJ NEW DRUG ADDON: CPT

## 2025-04-22 PROCEDURE — 96374 THER/PROPH/DIAG INJ IV PUSH: CPT

## 2025-04-22 PROCEDURE — 99284 EMERGENCY DEPT VISIT MOD MDM: CPT

## 2025-04-22 PROCEDURE — 6360000002 HC RX W HCPCS

## 2025-04-22 RX ORDER — DIPHENHYDRAMINE HYDROCHLORIDE 50 MG/ML
25 INJECTION, SOLUTION INTRAMUSCULAR; INTRAVENOUS ONCE
Status: COMPLETED | OUTPATIENT
Start: 2025-04-22 | End: 2025-04-22

## 2025-04-22 RX ORDER — PROCHLORPERAZINE EDISYLATE 5 MG/ML
10 INJECTION INTRAMUSCULAR; INTRAVENOUS ONCE
Status: COMPLETED | OUTPATIENT
Start: 2025-04-22 | End: 2025-04-22

## 2025-04-22 RX ADMIN — DIPHENHYDRAMINE HYDROCHLORIDE 25 MG: 50 INJECTION INTRAMUSCULAR; INTRAVENOUS at 16:26

## 2025-04-22 RX ADMIN — PROCHLORPERAZINE EDISYLATE 10 MG: 5 INJECTION INTRAMUSCULAR; INTRAVENOUS at 16:26

## 2025-04-22 NOTE — ED NOTES
Pt is A+Ox4  Pt complains of a headache with photosensitivity for 6 days  Pt complains of nausea for 6 days  Pt denies fever at home  Pt states she was seen at [facility] yesterday for the same headache  Pt ambulates with a steady gait from triage to 31e  Family at the bedside  All questions answered and needs met at this time  Whiteboard updated

## 2025-04-22 NOTE — CONSULTS
right sided lumbar facet steroid injection    OTHER SURGICAL HISTORY  01/12/2017    lumbar medial nerve block L2-L5    OTHER SURGICAL HISTORY  05/2000    D & C    PARTIAL HYSTERECTOMY (CERVIX NOT REMOVED)      TUBAL LIGATION         Social History:   Social History     Socioeconomic History    Marital status: Single     Spouse name: Not on file    Number of children: Not on file    Years of education: Not on file    Highest education level: Not on file   Occupational History    Not on file   Tobacco Use    Smoking status: Every Day     Current packs/day: 0.50     Average packs/day: 0.5 packs/day for 32.0 years (16.0 ttl pk-yrs)     Types: Cigarettes    Smokeless tobacco: Never   Vaping Use    Vaping status: Some Days    Passive vaping exposure: Yes   Substance and Sexual Activity    Alcohol use: Not Currently     Alcohol/week: 0.0 standard drinks of alcohol    Drug use: No    Sexual activity: Defer   Other Topics Concern    Not on file   Social History Narrative    Not on file     Social Drivers of Health     Financial Resource Strain: Low Risk  (10/28/2024)    Overall Financial Resource Strain (CARDIA)     Difficulty of Paying Living Expenses: Not hard at all   Food Insecurity: No Food Insecurity (4/21/2025)    Received from [facility]    Hunger Screening     Within the past 12 months we worried whether our food would run out before we got money to buy more.: Never True     Within the past 12 months the food we bought just didn't last and we didn't have money to get more.: Never True   Transportation Needs: Unknown (10/28/2024)    PRAPARE - Transportation     Lack of Transportation (Medical): Not on file     Lack of Transportation (Non-Medical): No   Physical Activity: Not on file   Stress: Not on file   Social Connections: Not on file   Intimate Partner Violence: Not on file   Housing Stability: Unknown (10/28/2024)    Housing Stability Vital Sign     Unable to Pay for Housing in the Last Year: Not on

## 2025-04-22 NOTE — TELEPHONE ENCOUNTER
Patient called requesting to be seen today for ED Follow Up - states headache won't go away. Patient was advised that there are no available openings today, and that I could schedule her for 4/24, but she stated she is unable to wait that long. Patient was persistent about being seen today.    Please advise.

## 2025-04-22 NOTE — TELEPHONE ENCOUNTER
Received message through [employer] that patient has been experiencing a severe headache since 04/18/2025. Pt had a cervical myelogram on 04/18/2025. Pt states she has tried laying flat, increasing fluids, has taken Tylenol and Excedrin migraine all with no relief. Pt also was evaluated at [facility] and was given migraine medication which decreased her headache but as of this morning the pain has returned and has worsened. Writer suggested going to [place] for evaluation if rest, Tylenol and increased fluids have not decreased her headache. Pt states she will need to find transportation.

## 2025-04-22 NOTE — PROGRESS NOTES
Patient underwent CT myelogram on 4/18/25 and has had a headache since then. The pain is constant with no change in pain with standing or lying down.  Pain is relieved completely  by the meds given to her in the ER.However the pain returns once the meds have worn off.   The patient is siting up in no apparent distress. She reports the pain is minimal.  This does not appear to be a post dural puncture headache.  Discussed continuing on her medications for the headache which she is agreeable to do.

## 2025-04-23 NOTE — DISCHARGE INSTRUCTIONS
You were seen today in the emergency department. We now feel you are safe for discharge home.    Please return to the emergency department immediately if develop any new or worsening concerns including chest pain, shortness of breath, abdominal pain, nausea, vomiting, diarrhea, weakness, loss consciousness, fever, chills, or any other concerns.    Please call your PCP and schedule appointment within the next 24 to 48 hours for follow-up.

## 2025-04-23 NOTE — ED PROVIDER NOTES
[facility]  FACULTY HANDOFF       Handoff taken on the following patient from prior Attending Physician: Dr. Jarvis  Pt Name: Palmira Treviño  PCP:  Ina Jackson PA-C  8:01 PM EDT    Attestation  I was available and discussed any additional care issues that arose and coordinated the management plans with the resident(s) caring for the patient during my duty period. Any areas of disagreement with resident's documentation of care or procedures are noted on the chart. I was personally present for the key portions of any/all procedures during my duty period. I have documented in the chart those procedures where I was not present during the key portions.         CHIEF COMPLAINT       Chief Complaint   Patient presents with    Headache         CURRENT MEDICATIONS     Previous Medications  Previous Medications    ALBUTEROL (PROVENTIL) (2.5 MG/3ML) 0.083% NEBULIZER SOLUTION    Take 3 mLs by nebulization every 4 hours as needed for Wheezing or Shortness of Breath    ALBUTEROL SULFATE HFA (VENTOLIN HFA) 108 (90 BASE) MCG/ACT INHALER    Inhale 2 puffs into the lungs 4 times daily as needed for Wheezing    LEVALBUTEROL (XOPENEX HFA) 45 MCG/ACT INHALER    Inhale 1 puff into the lungs every 4 hours as needed for Wheezing    MOMETASONE-FORMOTEROL (DULERA) 100-5 MCG/ACT INHALER    Inhale 2 puffs into the lungs 2 times daily    SUMATRIPTAN (IMITREX) 100 MG TABLET    Take 1 tablet by mouth once as needed for Migraine    TIZANIDINE (ZANAFLEX) 4 MG TABLET    Take 1 tablet by mouth nightly as needed (muscle spasms)       Encounter Medications  Orders Placed This Encounter   Medications    prochlorperazine (COMPAZINE) injection 10 mg    diphenhydrAMINE (BENADRYL) injection 25 mg       ALLERGIES     is allergic to tramadol, acetaminophen, hydrocodone, ketorolac, oxycodone, ciprofloxacin, fentanyl, vicodin [hydrocodone-acetaminophen], adhesive tape, percocet [oxycodone-acetaminophen], and 
remote study, symptoms are likely unrelated given that this headache started 5 days ago, about 2 weeks from the procedure.  Plan to administer Benadryl 25 mg IM, Compazine 10 mg IM, and reevaluate.     All results, including labs (if ordered), imaging (if ordered), and EKGs (if ordered) were independently interpreted by me.  See radiologist report for additional details on imaging studies.      (Please note that portions of this note were completed with a voice recognition program.  Efforts were made to edit the dictations but occasionally words are mis-transcribed.)    Abram Jarvis DO   Attending Emergency Medicine Physician         Abram Jarvis DO  04/22/25 1296    
refill takes less than 2 seconds.   Neurological:      General: No focal deficit present.      Mental Status: She is alert and oriented to person, place, and time. Mental status is at baseline.      Cranial Nerves: No cranial nerve deficit.      Sensory: No sensory deficit.      Motor: No weakness.      Coordination: Coordination normal.           DDX/DIAGNOSTIC RESULTS / EMERGENCY DEPARTMENT COURSE / MDM     Medical Decision Making  Palmira Treviño is a 44 y.o. female who presents with headache.  Patient is GCS 15, nontoxic appearing, not in acute distress, speaking full sentences, able to ambulate under their own power.  Review shows patient had recent CT myelogram.  Has had headache since then.  Reportedly sent in by neurosurgery to get a blood patch.  Will give migraine cocktail, consult neurosurgery and reevaluate.  No red flag symptoms of headache.      Risk  Prescription drug management.        EKG      All EKG's are interpreted by the Emergency Department Physician who either signs or Co-signs this chart in the absence of a cardiologist.    EMERGENCY DEPARTMENT COURSE:      ED Course as of 04/22/25 2023   Tue Apr 22, 2025 2022 Neurosurgery and anesthesia evaluated patient.  Anesthesia offered patient blood patch however her headache was improved so it was not done.  Neurosurgery recommends discharge.  Patient agreeable.  Patient has safe ride home. [AK]      ED Course User Index  [AK] Rigo Payton MD       PROCEDURES:    CONSULTS:  IP CONSULT TO NEUROSURGERY  IP CONSULT TO ANESTHESIOLOGY    CRITICAL CARE:  There was significant risk of life threatening deterioration of patient's condition requiring my direct management. Critical care time  minutes, excluding any documented procedures.    FINAL IMPRESSION      1. Acute nonintractable headache, unspecified headache type          DISPOSITION / PLAN     DISPOSITION Decision To Discharge 04/22/2025 08:23:12 PM   DISPOSITION CONDITION Stable           PATIENT

## 2025-04-24 ENCOUNTER — OFFICE VISIT (OUTPATIENT)
Dept: FAMILY MEDICINE CLINIC | Age: 45
End: 2025-04-24
Payer: COMMERCIAL

## 2025-04-24 VITALS
HEIGHT: 63 IN | HEART RATE: 81 BPM | WEIGHT: 234.1 LBS | SYSTOLIC BLOOD PRESSURE: 122 MMHG | OXYGEN SATURATION: 99 % | BODY MASS INDEX: 41.48 KG/M2 | DIASTOLIC BLOOD PRESSURE: 70 MMHG | TEMPERATURE: 98.3 F

## 2025-04-24 DIAGNOSIS — R73.01 IMPAIRED FASTING GLUCOSE: ICD-10-CM

## 2025-04-24 DIAGNOSIS — E11.9 CONTROLLED TYPE 2 DIABETES MELLITUS WITHOUT COMPLICATION, WITHOUT LONG-TERM CURRENT USE OF INSULIN (HCC): Primary | ICD-10-CM

## 2025-04-24 DIAGNOSIS — E78.2 MIXED HYPERLIPIDEMIA: ICD-10-CM

## 2025-04-24 LAB
ALBUMIN: 4.4 G/DL (ref 3.5–5.2)
ALP BLD-CCNC: 69 U/L (ref 35–104)
ALT SERPL-CCNC: 24 U/L (ref 0–35)
ANION GAP SERPL CALCULATED.3IONS-SCNC: 15 MMOL/L (ref 7–16)
AST SERPL-CCNC: 32 U/L (ref 0–35)
BILIRUB SERPL-MCNC: 0.4 MG/DL (ref 0–1.2)
BUN BLDV-MCNC: 10 MG/DL (ref 6–20)
CALCIUM SERPL-MCNC: 9.7 MG/DL (ref 8.6–10)
CHLORIDE BLD-SCNC: 99 MMOL/L (ref 98–107)
CHOLESTEROL, TOTAL: 176 MG/DL
CO2: 24 MMOL/L (ref 22–29)
CREAT SERPL-MCNC: 0.7 MG/DL (ref 0.5–1)
GFR, ESTIMATED: >90 ML/MIN/1.73M2
GLUCOSE BLD-MCNC: 101 MG/DL (ref 74–99)
HBA1C MFR BLD: 6.5 % (ref 4–5.6)
HCT VFR BLD CALC: 41 % (ref 34–48)
HDLC SERPL-MCNC: 55 MG/DL
HEMOGLOBIN: 13.1 G/DL (ref 11.5–15.5)
LDL CHOLESTEROL: 91 MG/DL
MCH RBC QN AUTO: 27.6 PG (ref 26–35)
MCHC RBC AUTO-ENTMCNC: 32 G/DL (ref 32–34.5)
MCV RBC AUTO: 86.5 FL (ref 80–99.9)
PDW BLD-RTO: 13.7 % (ref 11.5–15)
PLATELET # BLD: 299 K/UL (ref 130–450)
PMV BLD AUTO: 10.8 FL (ref 7–12)
POTASSIUM SERPL-SCNC: 4.1 MMOL/L (ref 3.5–5.1)
RBC # BLD: 4.74 M/UL (ref 3.5–5.5)
SODIUM BLD-SCNC: 137 MMOL/L (ref 136–145)
TOTAL PROTEIN: 7.4 G/DL (ref 6.4–8.3)
TRIGL SERPL-MCNC: 152 MG/DL
TSH SERPL DL<=0.05 MIU/L-ACNC: 2.13 UIU/ML (ref 0.27–4.2)
VLDLC SERPL CALC-MCNC: 30 MG/DL
WBC # BLD: 9.1 K/UL (ref 4.5–11.5)

## 2025-04-24 PROCEDURE — 1036F TOBACCO NON-USER: CPT | Performed by: FAMILY MEDICINE

## 2025-04-24 PROCEDURE — G8427 DOCREV CUR MEDS BY ELIG CLIN: HCPCS | Performed by: FAMILY MEDICINE

## 2025-04-24 PROCEDURE — 2022F DILAT RTA XM EVC RTNOPTHY: CPT | Performed by: FAMILY MEDICINE

## 2025-04-24 PROCEDURE — 3044F HG A1C LEVEL LT 7.0%: CPT | Performed by: FAMILY MEDICINE

## 2025-04-24 PROCEDURE — 99214 OFFICE O/P EST MOD 30 MIN: CPT | Performed by: FAMILY MEDICINE

## 2025-04-24 PROCEDURE — G8417 CALC BMI ABV UP PARAM F/U: HCPCS | Performed by: FAMILY MEDICINE

## 2025-04-24 SDOH — ECONOMIC STABILITY: FOOD INSECURITY: WITHIN THE PAST 12 MONTHS, YOU WORRIED THAT YOUR FOOD WOULD RUN OUT BEFORE YOU GOT MONEY TO BUY MORE.: NEVER TRUE

## 2025-04-24 SDOH — ECONOMIC STABILITY: FOOD INSECURITY: WITHIN THE PAST 12 MONTHS, THE FOOD YOU BOUGHT JUST DIDN'T LAST AND YOU DIDN'T HAVE MONEY TO GET MORE.: NEVER TRUE

## 2025-04-24 ASSESSMENT — PATIENT HEALTH QUESTIONNAIRE - PHQ9
SUM OF ALL RESPONSES TO PHQ QUESTIONS 1-9: 0
1. LITTLE INTEREST OR PLEASURE IN DOING THINGS: NOT AT ALL
2. FEELING DOWN, DEPRESSED OR HOPELESS: NOT AT ALL
SUM OF ALL RESPONSES TO PHQ QUESTIONS 1-9: 0

## 2025-04-24 NOTE — PROGRESS NOTES
recording.      An electronic signature was used to authenticate this note.    --Stacy Pizarro MD

## 2025-04-25 ENCOUNTER — RESULTS FOLLOW-UP (OUTPATIENT)
Dept: FAMILY MEDICINE CLINIC | Age: 45
End: 2025-04-25

## 2025-04-25 DIAGNOSIS — E11.9 CONTROLLED TYPE 2 DIABETES MELLITUS WITHOUT COMPLICATION, WITHOUT LONG-TERM CURRENT USE OF INSULIN (HCC): Primary | ICD-10-CM

## 2025-04-29 RX ORDER — AMLODIPINE BESYLATE 5 MG/1
5 TABLET ORAL DAILY
Qty: 90 TABLET | Refills: 3 | Status: SHIPPED | OUTPATIENT
Start: 2025-04-29

## 2025-04-29 RX ORDER — LOSARTAN POTASSIUM AND HYDROCHLOROTHIAZIDE 25; 100 MG/1; MG/1
1 TABLET ORAL DAILY
Qty: 90 TABLET | Refills: 3 | Status: SHIPPED | OUTPATIENT
Start: 2025-04-29

## 2025-05-02 ENCOUNTER — PATIENT MESSAGE (OUTPATIENT)
Dept: FAMILY MEDICINE CLINIC | Age: 45
End: 2025-05-02

## 2025-05-02 ENCOUNTER — TELEPHONE (OUTPATIENT)
Dept: FAMILY MEDICINE CLINIC | Age: 45
End: 2025-05-02

## 2025-05-02 NOTE — TELEPHONE ENCOUNTER
Pt calling to see what next steps are since the Tirzepatide was denied will we fight the denial or can something else be sent to the pharmacy. She was upset that it was denied on Wednesday and never received a call about moving forward with this or another medication.

## 2025-05-02 NOTE — TELEPHONE ENCOUNTER
Pt called back she called the insurance company and they are saying that they just need more information dx etc. And that it is covered by insurance. Will have Marva check thru cover my meds

## 2025-05-12 ENCOUNTER — HOSPITAL ENCOUNTER (OUTPATIENT)
Age: 45
Discharge: HOME | End: 2025-05-14
Payer: COMMERCIAL

## 2025-05-12 DIAGNOSIS — M50.90 CERVICAL DISC DISEASE: ICD-10-CM

## 2025-05-12 PROCEDURE — 72040 X-RAY EXAM NECK SPINE 2-3 VW: CPT

## 2025-05-14 ENCOUNTER — OFFICE VISIT (OUTPATIENT)
Age: 45
End: 2025-05-14
Payer: COMMERCIAL

## 2025-05-14 VITALS
HEART RATE: 77 BPM | BODY MASS INDEX: 20.77 KG/M2 | HEIGHT: 61 IN | SYSTOLIC BLOOD PRESSURE: 99 MMHG | WEIGHT: 110 LBS | TEMPERATURE: 97.5 F | DIASTOLIC BLOOD PRESSURE: 67 MMHG

## 2025-05-14 DIAGNOSIS — M54.12 CERVICAL RADICULOPATHY: ICD-10-CM

## 2025-05-14 DIAGNOSIS — R20.0 HAND NUMBNESS: Primary | ICD-10-CM

## 2025-05-14 PROCEDURE — G8420 CALC BMI NORM PARAMETERS: HCPCS | Performed by: NEUROLOGICAL SURGERY

## 2025-05-14 PROCEDURE — 99214 OFFICE O/P EST MOD 30 MIN: CPT | Performed by: NEUROLOGICAL SURGERY

## 2025-05-14 PROCEDURE — G8427 DOCREV CUR MEDS BY ELIG CLIN: HCPCS | Performed by: NEUROLOGICAL SURGERY

## 2025-05-14 PROCEDURE — 4004F PT TOBACCO SCREEN RCVD TLK: CPT | Performed by: NEUROLOGICAL SURGERY

## 2025-05-14 NOTE — PROGRESS NOTES
[facility], [facility], [facility] NEUROSURGERY  [address], [address]  [facility] [place] [address]  Dept: [phone]  Dept Fax: [phone]     Patient:  Palmira Treviño  YOB: 1980  Date: 5/14/25      Chief Complaint   Patient presents with    Follow-up     Cervical follow up           HPI:     I had the pleasure of seeing this patient back in the office today in follow-up.  As you know she is a 44-year-old female who presents with ongoing complaints of neck as well as right upper extremity discomfort consistent with cervical radiculopathy.  In addition she does describe numbness and tingling of the first 4 digits of her right hand as well.  Thus far the patient has been through extensive conservative measures including physical therapy as well as treatment through pain management for injections without improvement.  She continues to experience ongoing discomfort and symptoms as described above.  She did undergo a recent cervical myelogram CT as well as cervical flexion-extension x-rays which are reviewed today.  She has no new complaints at today's visit.  She has no symptoms in the left upper extremity.  Neurologic examination remains unremarkable the current myelogram CT performed April 18, 2025 demonstrates mild central stenosis at C4-5 and C5-6 levels.  Again noted is marked degenerative change with loss of disc space height isolated C5-6 disc base level.  There is a degree of mild foraminal stenosis at the C5-6 level particularly on the right.  No clear focal nerve root impingement was identified.  Additionally the patient did undergo cervical flexion-extension x-rays.  These x-rays demonstrated no evidence of gross instability.  I did review these images in the office today with the patient.  At this point time she has symptoms consistent with both cervical radiculopathy as well as hand numbness consistent with potential carpal

## 2025-05-30 DIAGNOSIS — E11.9 CONTROLLED TYPE 2 DIABETES MELLITUS WITHOUT COMPLICATION, WITHOUT LONG-TERM CURRENT USE OF INSULIN (HCC): ICD-10-CM

## 2025-06-02 RX ORDER — TIRZEPATIDE 2.5 MG/.5ML
INJECTION, SOLUTION SUBCUTANEOUS
Qty: 2 ML | Refills: 3 | Status: SHIPPED | OUTPATIENT
Start: 2025-06-02

## 2025-06-06 RX ORDER — LEVOTHYROXINE SODIUM 100 MCG
100 TABLET ORAL DAILY
Qty: 90 TABLET | Refills: 1 | Status: SHIPPED | OUTPATIENT
Start: 2025-06-06

## 2025-06-12 ENCOUNTER — TELEMEDICINE ON DEMAND (OUTPATIENT)
Age: 45
End: 2025-06-12
Payer: COMMERCIAL

## 2025-06-12 DIAGNOSIS — R06.2 WHEEZING: Primary | ICD-10-CM

## 2025-06-12 DIAGNOSIS — J06.9 VIRAL UPPER RESPIRATORY TRACT INFECTION: ICD-10-CM

## 2025-06-12 DIAGNOSIS — R05.1 ACUTE COUGH: ICD-10-CM

## 2025-06-12 PROCEDURE — G8420 CALC BMI NORM PARAMETERS: HCPCS | Performed by: NURSE PRACTITIONER

## 2025-06-12 PROCEDURE — 4004F PT TOBACCO SCREEN RCVD TLK: CPT | Performed by: NURSE PRACTITIONER

## 2025-06-12 PROCEDURE — 99213 OFFICE O/P EST LOW 20 MIN: CPT | Performed by: NURSE PRACTITIONER

## 2025-06-12 PROCEDURE — G8427 DOCREV CUR MEDS BY ELIG CLIN: HCPCS | Performed by: NURSE PRACTITIONER

## 2025-06-12 RX ORDER — BENZONATATE 200 MG/1
200 CAPSULE ORAL 3 TIMES DAILY PRN
Qty: 21 CAPSULE | Refills: 0 | Status: SHIPPED | OUTPATIENT
Start: 2025-06-12 | End: 2025-06-19

## 2025-06-12 ASSESSMENT — ENCOUNTER SYMPTOMS
WHEEZING: 1
SHORTNESS OF BREATH: 0
COUGH: 1
RHINORRHEA: 1

## 2025-06-12 NOTE — PROGRESS NOTES
discoloration noted on facial skin         [] Abnormal -            Psychiatric:       [x] Normal Affect [] Abnormal -        [] No Hallucinations           On this date 6/12/2025 I have spent 20 minutes reviewing previous notes, test results, and other pertinent medical information with the patient, discussing the diagnosis and importance of compliance with the treatment plan as well as documenting on the day of the visit.    --Leigh Willis, MOO - CNP

## 2025-06-13 ENCOUNTER — OFFICE VISIT (OUTPATIENT)
Dept: PRIMARY CARE CLINIC | Age: 45
End: 2025-06-13
Payer: COMMERCIAL

## 2025-06-13 VITALS
WEIGHT: 110 LBS | HEART RATE: 79 BPM | OXYGEN SATURATION: 97 % | SYSTOLIC BLOOD PRESSURE: 106 MMHG | BODY MASS INDEX: 20.77 KG/M2 | DIASTOLIC BLOOD PRESSURE: 64 MMHG | HEIGHT: 61 IN

## 2025-06-13 DIAGNOSIS — J45.41 MODERATE PERSISTENT ASTHMA WITH EXACERBATION: Primary | ICD-10-CM

## 2025-06-13 PROCEDURE — G8420 CALC BMI NORM PARAMETERS: HCPCS | Performed by: PHYSICIAN ASSISTANT

## 2025-06-13 PROCEDURE — 99214 OFFICE O/P EST MOD 30 MIN: CPT | Performed by: PHYSICIAN ASSISTANT

## 2025-06-13 PROCEDURE — 4004F PT TOBACCO SCREEN RCVD TLK: CPT | Performed by: PHYSICIAN ASSISTANT

## 2025-06-13 PROCEDURE — G8427 DOCREV CUR MEDS BY ELIG CLIN: HCPCS | Performed by: PHYSICIAN ASSISTANT

## 2025-06-13 RX ORDER — DEXAMETHASONE 4 MG/1
TABLET ORAL
COMMUNITY
Start: 2025-04-10 | End: 2025-06-13 | Stop reason: ALTCHOICE

## 2025-06-13 RX ORDER — AZELASTINE 1 MG/ML
1 SPRAY, METERED NASAL
COMMUNITY
Start: 2025-05-27

## 2025-06-13 RX ORDER — MUPIROCIN 2 %
1 OINTMENT (GRAM) TOPICAL EVERY 8 HOURS
COMMUNITY
Start: 2025-02-06

## 2025-06-13 RX ORDER — METHYLPREDNISOLONE 4 MG/1
TABLET ORAL
Qty: 21 TABLET | Refills: 0 | Status: SHIPPED | OUTPATIENT
Start: 2025-06-13 | End: 2025-06-19

## 2025-06-13 SDOH — ECONOMIC STABILITY: FOOD INSECURITY: WITHIN THE PAST 12 MONTHS, YOU WORRIED THAT YOUR FOOD WOULD RUN OUT BEFORE YOU GOT MONEY TO BUY MORE.: NEVER TRUE

## 2025-06-13 SDOH — ECONOMIC STABILITY: FOOD INSECURITY: WITHIN THE PAST 12 MONTHS, THE FOOD YOU BOUGHT JUST DIDN'T LAST AND YOU DIDN'T HAVE MONEY TO GET MORE.: NEVER TRUE

## 2025-06-13 ASSESSMENT — PATIENT HEALTH QUESTIONNAIRE - PHQ9
SUM OF ALL RESPONSES TO PHQ QUESTIONS 1-9: 0
2. FEELING DOWN, DEPRESSED OR HOPELESS: NOT AT ALL
1. LITTLE INTEREST OR PLEASURE IN DOING THINGS: NOT AT ALL
SUM OF ALL RESPONSES TO PHQ QUESTIONS 1-9: 0

## 2025-06-13 ASSESSMENT — ENCOUNTER SYMPTOMS
SHORTNESS OF BREATH: 1
WHEEZING: 0
COUGH: 1
CHEST TIGHTNESS: 1
RHINORRHEA: 0
SORE THROAT: 0

## 2025-06-13 NOTE — PROGRESS NOTES
[facility]  [facility]  [address]  [place] [address]  Dept: [phone]    Palmira Treviño is a 44 y.o. female who presents today for her medical conditions/complaints as noted below.      Chief Complaint   Patient presents with    Asthma     Patient is here today for a follow up from the emergency room visit from 6/12/25. She is feeling a little better but she has no energy and she feels like her heart rate has been very high.       HPI:     Asthma  She complains of cough and shortness of breath. There is no wheezing. Pertinent negatives include no chest pain, fever, rhinorrhea, sneezing or sore throat. Her past medical history is significant for asthma.     43 y/o F presents to the office for asthma. Reports headache and congestion that began on Wednesday. Took allegra and migraine medication without improvement. Did albuterol nebulizer yesterday morning and dulera without improvement. Had virtual visit yesterday and they rx tessalon and recommended using inhaler every 3 hours. She did not  the prescription. Was seen yesterday in the ER for similar sx. Negative labs and imaging at the time. Given decadron while in the ER and had some sx improvement that night.     Reports increased fatigue, shortness of breath, chest tightness, and diaphoresis since discharge from the ER. Denies cough, fever, chills, chest pain. Had 7 asthma attacks in the last 3 months. No known trigger for most recent exacerbation. No known sick contacts. Reports using all inhalers as prescribed. Appointments with allergy and ENT in August. Allergy to prednisone, however has tolerated decadron well in the past.     No components found for: \"LDLCHOLESTEROL\", \"LDLCALC\"    (goal LDL is <100)   No results found for: \"AST\", \"ALT\", \"BUN\", \"CR\"  BP Readings from Last 3 Encounters:   06/13/25 106/64   05/14/25 99/67   04/22/25 112/79          (goal 120/80)    Past Medical History:   Diagnosis

## 2025-06-20 ENCOUNTER — PATIENT MESSAGE (OUTPATIENT)
Dept: PRIMARY CARE CLINIC | Age: 45
End: 2025-06-20

## 2025-06-23 DIAGNOSIS — J45.40 MODERATE PERSISTENT ASTHMA WITHOUT COMPLICATION: ICD-10-CM

## 2025-07-21 RX ORDER — ROSUVASTATIN CALCIUM 5 MG/1
5 TABLET, COATED ORAL DAILY
Qty: 90 TABLET | Refills: 1 | Status: SHIPPED | OUTPATIENT
Start: 2025-07-21

## 2025-07-23 ENCOUNTER — TELEPHONE (OUTPATIENT)
Dept: PRIMARY CARE CLINIC | Age: 45
End: 2025-07-23

## 2025-07-23 DIAGNOSIS — M25.561 ACUTE PAIN OF RIGHT KNEE: Primary | ICD-10-CM

## 2025-07-23 NOTE — TELEPHONE ENCOUNTER
Patient is requesting an Xray of her right knee    Patient states that she's been experiencing a sharp pain in her right knee when she kneels down - she states that it feels like glass is stabbing her    She attempted to schedule an appointment, but nothing before 8/19 worked with her schedule    Please advise

## 2025-07-24 NOTE — TELEPHONE ENCOUNTER
Ordered knee x-ray   Dupixent Pregnancy And Lactation Text: This medication likely crosses the placenta but the risk for the fetus is uncertain. This medication is excreted in breast milk.

## 2025-09-02 ENCOUNTER — HOSPITAL ENCOUNTER (OUTPATIENT)
Dept: NEUROLOGY | Age: 45
Discharge: HOME | End: 2025-09-02
Payer: COMMERCIAL

## 2025-09-02 PROCEDURE — 95886 MUSC TEST DONE W/N TEST COMP: CPT | Performed by: PHYSICAL MEDICINE & REHABILITATION

## 2025-09-02 PROCEDURE — 95908 NRV CNDJ TST 3-4 STUDIES: CPT | Performed by: PHYSICAL MEDICINE & REHABILITATION

## (undated) DEVICE — ENDO KIT W/SYRINGE: Brand: MEDLINE INDUSTRIES, INC.

## (undated) DEVICE — GLOVE ORTHO 8   MSG9480

## (undated) DEVICE — DEFENDO AIR WATER SUCTION AND BIOPSY VALVE KIT FOR  OLYMPUS: Brand: DEFENDO AIR/WATER/SUCTION AND BIOPSY VALVE